# Patient Record
Sex: MALE | Race: WHITE | Employment: OTHER | URBAN - METROPOLITAN AREA
[De-identification: names, ages, dates, MRNs, and addresses within clinical notes are randomized per-mention and may not be internally consistent; named-entity substitution may affect disease eponyms.]

---

## 2017-02-02 DIAGNOSIS — M54.2 CERVICALGIA: ICD-10-CM

## 2017-06-01 ENCOUNTER — GENERIC CONVERSION - ENCOUNTER (OUTPATIENT)
Dept: OTHER | Facility: OTHER | Age: 57
End: 2017-06-01

## 2017-06-29 ENCOUNTER — ALLSCRIPTS OFFICE VISIT (OUTPATIENT)
Dept: OTHER | Facility: OTHER | Age: 57
End: 2017-06-29

## 2017-06-29 ENCOUNTER — GENERIC CONVERSION - ENCOUNTER (OUTPATIENT)
Dept: OTHER | Facility: OTHER | Age: 57
End: 2017-06-29

## 2017-06-30 LAB
A/G RATIO (HISTORICAL): 1.5 (ref 1.2–2.2)
ALBUMIN SERPL BCP-MCNC: 3.9 G/DL (ref 3.5–5.5)
ALP SERPL-CCNC: 70 IU/L (ref 39–117)
ALT SERPL W P-5'-P-CCNC: 13 IU/L (ref 0–44)
AST SERPL W P-5'-P-CCNC: 26 IU/L (ref 0–40)
BASOPHILS # BLD AUTO: 0 %
BASOPHILS # BLD AUTO: 0 X10E3/UL (ref 0–0.2)
BILIRUB SERPL-MCNC: 0.3 MG/DL (ref 0–1.2)
BUN SERPL-MCNC: 19 MG/DL (ref 6–24)
BUN/CREA RATIO (HISTORICAL): 20 (ref 9–20)
CALCIUM SERPL-MCNC: 9.2 MG/DL (ref 8.7–10.2)
CHLORIDE SERPL-SCNC: 103 MMOL/L (ref 96–106)
CHOLEST SERPL-MCNC: 168 MG/DL (ref 100–199)
CHOLEST/HDLC SERPL: 3.7 RATIO UNITS (ref 0–5)
CO2 SERPL-SCNC: 25 MMOL/L (ref 18–29)
CREAT SERPL-MCNC: 0.95 MG/DL (ref 0.76–1.27)
DEPRECATED RDW RBC AUTO: 13.6 % (ref 12.3–15.4)
EGFR AFRICAN AMERICAN (HISTORICAL): 102 ML/MIN/1.73
EGFR-AMERICAN CALC (HISTORICAL): 88 ML/MIN/1.73
EOSINOPHIL # BLD AUTO: 0.1 X10E3/UL (ref 0–0.4)
EOSINOPHIL # BLD AUTO: 2 %
GLUCOSE SERPL-MCNC: 101 MG/DL (ref 65–99)
HCT VFR BLD AUTO: 41.3 % (ref 37.5–51)
HDLC SERPL-MCNC: 46 MG/DL
HGB BLD-MCNC: 13.2 G/DL (ref 12.6–17.7)
IMM.GRANULOCYTES (CD4/8) (HISTORICAL): 0 %
IMM.GRANULOCYTES (CD4/8) (HISTORICAL): 0 X10E3/UL (ref 0–0.1)
LDLC SERPL CALC-MCNC: 105 MG/DL (ref 0–99)
LYME IGG/IGM AB (HISTORICAL): <0.91 ISR (ref 0–0.9)
LYME IGM (HISTORICAL): <0.8 INDEX (ref 0–0.79)
LYMPHOCYTES # BLD AUTO: 1.7 X10E3/UL (ref 0.7–3.1)
LYMPHOCYTES # BLD AUTO: 26 %
MCH RBC QN AUTO: 27 PG (ref 26.6–33)
MCHC RBC AUTO-ENTMCNC: 32 G/DL (ref 31.5–35.7)
MCV RBC AUTO: 85 FL (ref 79–97)
MONOCYTES # BLD AUTO: 0.4 X10E3/UL (ref 0.1–0.9)
MONOCYTES (HISTORICAL): 6 %
NEUTROPHILS # BLD AUTO: 4.4 X10E3/UL (ref 1.4–7)
NEUTROPHILS # BLD AUTO: 66 %
PLATELET # BLD AUTO: 194 X10E3/UL (ref 150–379)
POTASSIUM SERPL-SCNC: 4.8 MMOL/L (ref 3.5–5.2)
PROSTATE SPECIFIC ANTIGEN (HISTORICAL): 5.3 NG/ML (ref 0–4)
RBC (HISTORICAL): 4.89 X10E6/UL (ref 4.14–5.8)
SODIUM SERPL-SCNC: 143 MMOL/L (ref 134–144)
TOT. GLOBULIN, SERUM (HISTORICAL): 2.6 G/DL (ref 1.5–4.5)
TOTAL PROTEIN (HISTORICAL): 6.5 G/DL (ref 6–8.5)
TRIGL SERPL-MCNC: 84 MG/DL (ref 0–149)
TSH SERPL DL<=0.05 MIU/L-ACNC: 2.31 UIU/ML (ref 0.45–4.5)
VLDLC SERPL CALC-MCNC: 17 MG/DL (ref 5–40)
WBC # BLD AUTO: 6.7 X10E3/UL (ref 3.4–10.8)

## 2017-07-18 ENCOUNTER — ALLSCRIPTS OFFICE VISIT (OUTPATIENT)
Dept: OTHER | Facility: OTHER | Age: 57
End: 2017-07-18

## 2017-10-10 ENCOUNTER — GENERIC CONVERSION - ENCOUNTER (OUTPATIENT)
Dept: OTHER | Facility: OTHER | Age: 57
End: 2017-10-10

## 2017-11-13 ENCOUNTER — ALLSCRIPTS OFFICE VISIT (OUTPATIENT)
Dept: OTHER | Facility: OTHER | Age: 57
End: 2017-11-13

## 2017-11-14 NOTE — PROGRESS NOTES
Assessment    1  Chronic neck pain (723 1,338 29) (M54 2,G89 29)   2  Generalized anxiety disorder (300 02) (F41 1)   3  Former smoker (V15 82) (E00 909)    Discussion/Summary    1) chronic neck pain: ASK ABOUT TRIGGER POINT INJECTIONS, PAIN MANAGEMENT - has seen Dr Elidia Williamson in the pastanxiety: controlled  has tried other medications without good success  Possible side effects of new medications were reviewed with the patient/guardian today  The treatment plan was reviewed with the patient/guardian  The patient/guardian understands and agrees with the treatment plan      Chief Complaint  PT HERE TO SIGN THE PAIN CONTRACT TODAY  HE DOES NOT NEED ANY MEDS TODAY  History of Present Illness  SYMPTOMS OF COLD FOR THE PAST 3 WEEKS but seems to be improving  He has nasal congestion, dry cough, postnasal drip  He denies any shortness of breath  has been under stress lately because his son was recently released from [facility] living with him  Now is back in [facility] and is in need of a drug rehabilitation center  has chronic neck pain across the top of the shoulders and would like to get an evaluation      Review of Systems   Constitutional: No fever or chills, feels well, no tiredness, no recent weight gain or weight loss  Eyes: No complaints of eye pain, no red eyes, no discharge from eyes, no itchy eyes  ENT: no complaints of earache, no hearing loss, no nosebleeds, no nasal discharge, no sore throat, no hoarseness  Cardiovascular: No complaints of slow heart rate, no fast heart rate, no chest pain, no palpitations, no leg claudication, no lower extremity  Respiratory: No complaints of shortness of breath, no wheezing, no cough, no SOB on exertion, no orthopnea or PND  Gastrointestinal: No complaints of abdominal pain, no constipation, no nausea or vomiting, no diarrhea or bloody stools  Genitourinary: No complaints of dysuria, no incontinence, no hesitancy, no nocturia, no genital lesion, no testicular pain  Musculoskeletal: Chronic neck pain, but-- as noted in HPI  Integumentary: No complaints of skin rash or skin lesions, no itching, no skin wound, no dry skin  Neurological: No compliants of headache, no confusion, no convulsions, no numbness or tingling, no dizziness or fainting, no limb weakness, no difficulty walking  Psychiatric: anxiety, but-- as noted in HPI,-- not suicidal-- and-- no sleep disturbances  Endocrine: No complaints of proptosis, no hot flashes, no muscle weakness, no erectile dysfunction, no deepening of the voice, no feelings of weakness  Hematologic/Lymphatic: No complaints of swollen glands, no swollen glands in the neck, does not bleed easily, no easy bruising  Active Problems  1  Benign hypertrophy of prostate (600 00) (N40 0)   2  Cataract (366 9) (H26 9)   3  Chronic neck pain (723 1,338 29) (M54 2,G89 29)   4  Chronic pain (338 29) (G89 29)   5  Depression (311) (F32 9)   6  Elevated PSA (790 93) (R97 20)   7  Esophageal reflux (530 81) (K21 9)   8  Fatigue (780 79) (R53 83)   9  Generalized anxiety disorder (300 02) (F41 1)   10  HCV infection (070 70) (B19 20)   11  Hyperlipidemia (272 4) (E78 5)    Past Medical History  1  History of Acute Pneumonitis (486)   2  History of Bulging disc (722 2)   3  History of Hemorrhage of anus and rectum (569 3) (K62 5)   4  History of headache (V13 89) (Z87 898)   5  History of hemorrhoids (V13 89) (Z87 19)   6  History of hiatal hernia (V12 79) (Z87 19)   7  History of neoplasm of uncertain behavior of skin (V13 3) (Z86 03)   8  History of prostatitis (V13 89) (Z87 438)   9  History of urinary frequency (V13 09) (Z87 898)   10  History of Lateral epicondylitis, unspecified laterality (726 32) (M77 10)   11  History of Lyme disease (088 81) (A69 20)   12  History of Mononeuritis of upper limb, unspecified laterality (354 9) (G56 90)   13  History of Neck pain (723 1) (M54 2)   14  History of Other Specified Family Circumstances (V61 8)   15  History of Shoulder joint pain, unspecified laterality   16  History of Stye In The Right Eye (373 11)    The active problems and past medical history were reviewed and updated today  Surgical History  1  History of Fusion / Refusion Of Vertebrae   2  History of Hernia Repair    The surgical history was reviewed and updated today  Family History  Mother    1  Family history of diabetes mellitus (V18 0) (Z83 3)  Family History    2  Family history of alcoholism (V17 0) (Z81 1)    The family history was reviewed and updated today  Social History     · Former smoker (Y61 45) (F85 522)  The social history was reviewed and updated today  The social history was reviewed and is unchanged  Current Meds   1  ALPRAZolam 0 25 MG Oral Tablet; TAKE 1 TABLET FOUR TIMES A DAY PRN; Therapy: 13Wnd6024 to (Evaluate:02Zfs2615)  Requested for: 04EKY4738; Last Rx:41Kti4093 Ordered   2  Meloxicam 15 MG Oral Tablet; TAKE 1 TABLET DAILY WITH FOOD; Therapy: 48HCH8680 to (Evaluate:25Jan2018)  Requested for: 98IAD0274; Last Rx:66Ivo4445 Ordered   3  Tamsulosin HCl - 0 4 MG Oral Capsule; TAKE 2 CAPSULES DAILY 30 MINUTES FOLLOWING THE SAME MEAL EACH DAY; Therapy: 94OCO1005 to (Evaluate:96Gom8164)  Requested for: 03GOY6235; Last Rx:88Utd5878 Ordered   4  TraMADol HCl - 50 MG Oral Tablet; Take 1 tablet 4 times daily; Therapy: 20ZQO0156 to (Jenniffer Goodpasture)  Requested for: 01Foo2935; Last Rx:41Aef5774 Ordered    The medication list was reviewed and updated today  Allergies  1  Penicillins   2  Citalopram Hydrobromide TABS   3  Sulfa Drugs    Vitals  Vital Signs    Recorded: 14OYA8509 08:33AM   Temperature 96 9 F   Heart Rate 71   Systolic 610   Diastolic 80   Height 5 ft 8 5 in   Weight 155 lb 4 oz   BMI Calculated 23 26   BSA Calculated 1 85   O2 Saturation 98       Physical Exam   Constitutional  General appearance: No acute distress, well appearing and well nourished     Eyes  Conjunctiva and lids: No swelling, erythema, or discharge  Pupils and irises: Equal, round and reactive to light  Ears, Nose, Mouth, and Throat  External inspection of ears and nose: Normal    Otoscopic examination: Tympanic membrance translucent with normal light reflex  Canals patent without erythema  Nasal mucosa, septum, and turbinates: Normal without edema or erythema  Oropharynx: Normal with no erythema, edema, exudate or lesions  Pulmonary  Respiratory effort: No increased work of breathing or signs of respiratory distress  Auscultation of lungs: Clear to auscultation, equal breath sounds bilaterally, no wheezes, no rales, no rhonci  Cardiovascular  Auscultation of heart: Normal rate and rhythm, normal S1 and S2, without murmurs  Examination of extremities for edema and/or varicosities: Normal    Abdomen  Abdomen: Non-tender, no masses  Liver and spleen: No hepatomegaly or splenomegaly  Musculoskeletal  Gait and station: Normal    Digits and nails: Normal without clubbing or cyanosis  Inspection/palpation of joints, bones, and muscles: Abnormal  -- Increase tension trapezius bilaterally left greater than right  Psychiatric  Orientation to person, place and time: Normal    Mood and affect: Normal          Health Management  History of Encounter for screening colonoscopy   COLONOSCOPY; every 10 years; Last 01SWU8208; Next Due: 40GYT1682; Active  History of Medicare welcome visit   [address] AAA; every 1 year; Last 95TJZ4870; Next Due: 29Nxj8816; Active  History of Screening for depression   *[facility]-Depression Screening; every 1 year; Last 62BNL4831; Next Due: 38Ojc1864; Active  History of Screening for other and unspecified genitourinary condition   *[facility] - Urinary Incontinence Screen (Dx Z13 89 Screen for UI); every 1 year; Last 29TTX6153; NextDue: 45FBE2559; Active  *VB-Depression Screening; every 1 year; Last 35WZQ2727; Next Due: 48Flb5002;  Active  History of Special screening for other neurological conditions   *VB - Fall Risk Assessment  (Dx Z13 89 Screen for Neurologic Disorder); every 1 year; JRSO00VDW6683; Next Due: 74Mkv1195; Active  *VB - Urinary Incontinence Screen (Dx Z13 89 Screen for UI); every 1 year; Last 60JND9793; NextDue: 81QWH5513;  Active    Signatures   Electronically signed by : Marion Bermeo DO; Nov 13 2017  9:48PM EST                       (Author)

## 2017-11-15 ENCOUNTER — GENERIC CONVERSION - ENCOUNTER (OUTPATIENT)
Dept: OTHER | Facility: OTHER | Age: 57
End: 2017-11-15

## 2018-01-02 ENCOUNTER — GENERIC CONVERSION - ENCOUNTER (OUTPATIENT)
Dept: FAMILY MEDICINE CLINIC | Facility: CLINIC | Age: 58
End: 2018-01-02

## 2018-01-10 NOTE — PROGRESS NOTES
Assessment    1  Encounter for preventive health examination (V70 0) (Z00 00)   2  Former smoker (V15 82) (K60 755)   3  Benign hypertrophy of prostate (600 00) (N40 0)   4  Chronic pain (338 29) (G89 29)   5  Elevated PSA (790 93) (R97 2)   6  Chronic neck pain (723 1,338 29) (M54 2,G89 29)    Plan  Health Maintenance    · Brush your teeth freq1 and floss at least once a day ; Status:Complete;   Done:  20SKS2020   · Decreasing the stress in your life may help your condition improve ; Status:Complete;    Done: 19FEI8008   · Drink plenty of fluids ; Status:Complete;   Done: 47ISL2300   · Regular aerobic exercise can help reduce stress ; Status:Complete;   Done: 58CRU9567   · We recommend routine visits to a dentist ; Status:Complete;   Done: 12PRA5581   · We recommend that you follow the "Mediterranean diet "; Status:Complete;   Done:  37DLJ1694   · We recommend you modify your diet to achieve and maintain a healthy weight  Being  overweight may increase your risk for developing health problems such as diabetes,  heart disease, and cancer  Avoid high fat foods and eat a balanced diet rich  in fruits and vegetables  The combination of a reduced-calorie diet and increased  physical activity is recommended  Please let us know if you would like to  learn more about your nutrition and calorie needs, and additional options including  weight loss programs that can help you achieve your goals ; Status:Complete;   Done:  38GBE5271   · We recommend you modify your diet to achieve and maintain a healthy weight  Being  underweight may increase your risk of developing health problems from vitamin and  mineral deficiencies  We recommend a balanced diet rich in fruits and vegetables  You  may also consider increasing your calorie intake by eating more frequently or adding  nuts, avocados, and low-fat cheese or milk to your meals    Please let us know  if you would like to learn more about your nutrition and calorie needs, and additional  options to help you achieve your weight goals ; Status:Complete;   Done: 54CPH9305   · Call [phone] if: You have any warning signs of skin cancer ; Status:Complete;    Done: 86TPD5577   · Call 911 if: You experience a new kind of chest pain (angina) or pressure ;  Status:Complete;   Done: 90KKO7929    Discussion/Summary  Impression: health maintenance visit, healthy adult male  Currently, he eats an adequate diet and has an inadequate exercise regimen  Prostate cancer screening: prostate cancer screening is current  Colorectal cancer screening: colorectal cancer screening is current and colonoscopy is needed every ten years  Patient discussion: discussed with the patient  1) BPH/elevated PSA: KEEP FOLLOW UP WITH UROLOGY FOR ELEVATED PSA, CONTINUE TAMULOSIN  2) chronic neck pain with intermittent neuropathy: Continue meloxicam and tramadol as needed and pain management evaluation when able  3) depression/anxiety: Continue alprazolam as needed, deferring other medication for depressive symptoms, recommend counseling     Chief Complaint  PT IS HERE FOR YEARLY WELLNESS EXAM  PT WILL RESCHEDULE LIPID PANEL   [address] PHONE NUMBERS  PT DEFERRED  PT DOES NOT WANT TO BE HERE ANYMORE AND PRAYS THE [facility] WOULD TAKE HIM EVERY NIGHT  HE WILL DISCUSS WITH DR Meaghan Virgen  History of Present Illness  HM, Adult Male: The patient is being seen for a health maintenance evaluation  General Health: The patient's health since the last visit is described as fair  He does not have regular dental visits  He denies vision problems  He denies hearing loss  Immunizations status: up to date  Lifestyle:  He consumes a diverse and healthy diet  He does not have any weight concerns  He does not exercise regularly  He does not use tobacco  He denies alcohol use  He denies drug use  Reproductive health:  the patient is not sexually active     Screening: cancer screening reviewed and updated  metabolic screening reviewed and updated  risk screening reviewed and updated  HPI: Patient is here for a well exam today  He remains very upset about the divorce from his wife about 2 years ago  He is having trouble moving on his multiple stressors in his life  He is not willing to go to a therapist  He denies suicidal ideations He is thinking about moving to [place]  He denies any chest pains or shortness of breath  He has chronic neck pain and is getting intermittent symptoms into his arms  He is not ready to to any further treatment for his neck pain  He continues with tramadol  His bowel movements are regular  He is seeing a urologist for elevated PSA level  He is currently on tamsulosin daily and there has been a decrease in his PSA from November 2015 to March 2016, 7 2-6 8  Review of Systems    Constitutional: feeling tired and recent 8 lb weight loss, but as noted in HPI, no fever, not feeling poorly and no chills  Eyes: No complaints of eye pain, no red eyes, no discharge from eyes, no itchy eyes  ENT: no complaints of earache, no hearing loss, no nosebleeds, no nasal discharge, no sore throat, no hoarseness  Cardiovascular: No complaints of slow heart rate, no fast heart rate, no chest pain, no palpitations, no leg claudication, no lower extremity  Respiratory: No complaints of shortness of breath, no wheezing, no cough, no SOB on exertion, no orthopnea or PND  Gastrointestinal: No complaints of abdominal pain, no constipation, no nausea or vomiting, no diarrhea or bloody stools  Genitourinary: No complaints of dysuria, no incontinence, no hesitancy, no nocturia, no genital lesion, no testicular pain  Musculoskeletal: Chronic neck pain, but as noted in HPI  Integumentary: No complaints of skin rash or skin lesions, no itching, no skin wound, no dry skin     Neurological: No compliants of headache, no confusion, no convulsions, no numbness or tingling, no dizziness or fainting, no limb weakness, no difficulty walking  Psychiatric: Is not suicidal, no sleep disturbances, no anxiety or depression, no change in personality, no emotional problems  Endocrine: No complaints of proptosis, no hot flashes, no muscle weakness, no erectile dysfunction, no deepening of the voice, no feelings of weakness  Hematologic/Lymphatic: No complaints of swollen glands, no swollen glands in the neck, does not bleed easily, no easy bruising  Active Problems    1  Abnormal loss of weight (783 21) (R63 4)   2  Benign hypertrophy of prostate (600 00) (N40 0)   3  Cataract (366 9) (H26 9)   4  Chronic pain (338 29) (G89 29)   5  Depression (311) (F32 9)   6  Elevated PSA (790 93) (R97 2)   7  Encounter for screening colonoscopy (V76 51) (Z12 11)   8  Encounter for screening for malignant neoplasm of prostate (V76 44) (Z12 5)   9  Encounter for vitamin deficiency screening (V77 99) (Z13 21)   10  Esophageal reflux (530 81) (K21 9)   11  Generalized anxiety disorder (300 02) (F41 1)   12  HCV (hepatitis C virus) (070 70) (B19 20)   13  Hyperlipidemia (272 4) (E78 5)   14   Need for influenza vaccination (V04 81) (Z23)    Past Medical History    · History of Acute Pneumonitis (486)   · History of Bulging disc (722 2)   · History of Hemorrhage of anus and rectum (569 3) (K62 5)   · History of fatigue (V13 89) (G01 072)   · History of headache (V13 89) (Y65 049)   · History of hemorrhoids (V13 89) (Z87 19)   · History of hiatal hernia (V12 79) (Z87 19)   · History of neoplasm of uncertain behavior of skin (V13 3) (Z87 2)   · History of prostatitis (V13 89) (U81 441)   · History of urinary frequency (V13 09) (Z87 898)   · History of Lateral epicondylitis, unspecified laterality (726 32) (M77 10)   · History of Lyme disease (088 81) (A69 20)   · History of Mononeuritis of upper limb, unspecified laterality (354 9) (G56 90)   · History of Neck pain (723 1) (M54 2)   · History of Other Specified Family Circumstances (V61 8)   · History of Screening for diabetes mellitus (V77 1) (Z13 1)   · History of Screening for thyroid disorder (V77 0) (Z13 29)   · History of Shoulder joint pain, unspecified laterality   · History of Stye In The Right Eye (373 11)    Surgical History    · History of Fusion / Refusion Of Vertebrae   · History of Hernia Repair    Family History  Mother    · Family history of diabetes mellitus (V18 0) (Z83 3)  Family History    · Family history of alcoholism (V17 0) (Z81 1)    Social History    · Former smoker (V15 82) (R21 739)    Current Meds   1  ALPRAZolam 0 25 MG Oral Tablet; TAKE 1 TABLET 4 TIMES DAILY AS NEEDED; Therapy: 06GEG9123 to (Evaluate:41Pgz2532)  Requested for: 51WMY6475; Last   UC:83PAY1377; Status: ACTIVE - Renewal Denied Ordered   2  Meloxicam 15 MG Oral Tablet; TAKE 1 TABLET DAILY WITH FOOD; Therapy: 89OGV2014 to (Evaluate:14Iid7898)  Requested for: 19ZZP2009; Last   Rx:92Tpw1256 Ordered   3  Tamsulosin HCl - 0 4 MG Oral Capsule; TAKE 2 CAPSULES DAILY 30 MINUTES   FOLLOWING THE SAME MEAL EACH DAY; Therapy: 34JJH5784 to (Evaluate:24Gfo5331)  Requested for: 08DWC4466; Last   Rx:18Iew7787 Ordered   4  TraMADol HCl - 50 MG Oral Tablet; TAKE 1 TABLET FOUR TIMES A DAY; Therapy: 43YMR0834 to (Evaluate:12Pnk1785)  Requested for: 44KFL9373; Last   Rx:56Hay6134; Status: ACTIVE - Renewal Denied Ordered    Allergies    1  Penicillins   2  Citalopram Hydrobromide TABS   3  Sulfa Drugs    Vitals   Recorded: 39UJO0408 11:24AM   Temperature 98 4 F   Heart Rate 68   Systolic 387   Diastolic 85   Height 5 ft 8 5 in   Weight 158 lb 8 0 oz   BMI Calculated 23 75   BSA Calculated 1 86   O2 Saturation 98     Physical Exam    Constitutional   General appearance: No acute distress, well appearing and well nourished  Head and Face   Head and face: Normal     Palpation of the face and sinuses: No sinus tenderness  Eyes   Conjunctiva and lids: No erythema, swelling or discharge      Pupils and irises: Equal, round, reactive to light  Ears, Nose, Mouth, and Throat   External inspection of ears and nose: Normal     Otoscopic examination: Tympanic membranes translucent with normal light reflex  Canals patent without erythema  Hearing: Normal     Nasal mucosa, septum, and turbinates: Normal without edema or erythema  Lips, teeth, and gums: Normal, good dentition  Oropharynx: Normal with no erythema, edema, exudate or lesions  Neck   Neck: Supple, symmetric, trachea midline, no masses  Thyroid: Normal, no thyromegaly  Pulmonary   Respiratory effort: No increased work of breathing or signs of respiratory distress  Auscultation of lungs: Clear to auscultation  Cardiovascular   Auscultation of heart: Normal rate and rhythm, normal S1 and S2, no murmurs  Examination of extremities for edema and/or varicosities: Normal     Abdomen   Abdomen: Non-tender, no masses  Liver and spleen: No hepatomegaly or splenomegaly  Lymphatic   Palpation of lymph nodes in neck: No lymphadenopathy  Musculoskeletal   Gait and station: Normal     Inspection/palpation of digits and nails: Normal without clubbing or cyanosis  Range of motion: Abnormal   Decreased range of motion cervical spine  Stability: Normal     Muscle strength/tone: Normal     Skin   Skin and subcutaneous tissue: Normal without rashes or lesions  Palpation of skin and subcutaneous tissue: Normal turgor  Neurologic   Cortical function: Normal mental status  Coordination: Normal finger to nose and heel to shin  Psychiatric   Judgment and insight: Normal     Orientation to person, place and time: Normal     Recent and remote memory: Intact  Mood and affect: Abnormal   Agitated        Results/Data  PHQ-9 Adult Depression Screening 29Jun2016 11:37AM User, Ahs     Test Name Result Flag Reference   PHQ-9 Adult Depression Score 14     Over the last two weeks, how often have you been bothered by any of the following problems? Little interest or pleasure in doing things: More than half the days - 2  Feeling down, depressed, or hopeless: More than half the days - 2  Trouble falling or staying asleep, or sleeping too much: More than half the days - 2  Feeling tired or having little energy: Several days - 1  Poor appetite or over eating: More than half the days - 2  Feeling bad about yourself - or that you are a failure or have let yourself or your family down: More than half the days - 2  Trouble concentrating on things, such as reading the newspaper or watching television: More than half the days - 2  Moving or speaking so slowly that other people could have noticed  Or the opposite -  being so fidgety or restless that you have been moving around a lot more than usual: Several days - 1  Thoughts that you would be better off dead, or of hurting yourself in some way: Not at all - 0   PHQ-9 Adult Depression Screening Positive     PHQ-9 Difficulty Level Somewhat difficult     PHQ-9 Severity Moderate Depression       (1) PSA (SCREEN) (Dx V76 44 Screen for Prostate Cancer) 01KXB6049 08:36AM Akosua Cruz     Test Name Result Flag Reference   Prostate Specific Ag, Serum 6 8 ng/mL H 0 0-4 0   Roche ECLIA methodology  According to the American [employer] Association, Serum PSA should  decrease and remain at undetectable levels after radical  prostatectomy  The AUA defines biochemical recurrence as an initial  PSA value 0 2 ng/mL or greater followed by a subsequent confirmatory  PSA value 0 2 ng/mL or greater  Values obtained with different assay methods or kits cannot be used  interchangeably  Results cannot be interpreted as absolute evidence  of the presence or absence of malignant disease  Health Management  Encounter for screening colonoscopy   COLONOSCOPY; every 10 years; Last 90EMO6731; Next Due: 53REG3409;  Active    Signatures   Electronically signed by : Cosme Denny DO; Jun 30 2016 12:22AM EST (Author)

## 2018-01-11 NOTE — RESULT NOTES
Verified Results  (LC) Vitamin D, 25-Hydroxy, Total 67LZY9177 08:36AM Wisam Sheller     Test Name Result Flag Reference   Vitamin D, 25-Hydroxy, Serum 26 ng/mL L    Reference Range:   All Ages: Target levels 30 - 100

## 2018-01-11 NOTE — RESULT NOTES
Verified Results  (1) CBC/PLT/DIFF [id number] 08:36AM Taras Bossman     Test Name Result Flag Reference   WBC 8 7 x10E3/uL  3 4-10 8   RBC 5 37 x10E6/uL  4 14-5 80   Hemoglobin 14 3 g/dL  12 6-17 7   Hematocrit 44 9 %  37 5-51 0   MCV 84 fL  79-97   MCH 26 6 pg  26 6-33 0   MCHC 31 8 g/dL  31 5-35 7   RDW 14 1 %  12 3-15 4   Platelets 190 B31Y1/NS  150-379   Neutrophils 59 %     Lymphs 32 %     Monocytes 6 %     Eos 3 %     Basos 0 %     Neutrophils (Absolute) 5 0 x10E3/uL  1 4-7 0   Lymphs (Absolute) 2 8 x10E3/uL  0 7-3 1   Monocytes(Absolute) 0 6 x10E3/uL  0 1-0 9   Eos (Absolute) 0 3 x10E3/uL  0 0-0 4   Baso (Absolute) 0 0 x10E3/uL  0 0-0 2   Immature Granulocytes 0 %     Immature Grans (Abs) 0 0 x10E3/uL  0 0-0 1     (1) TSH [id number] 08:36AM Taras Bossman     Test Name Result Flag Reference   TSH 3 390 uIU/mL  0 450-4 500     (1) LIPID PANEL, FASTING [id number] 08:36AM Taras Bennington     Test Name Result Flag Reference   Cholesterol, Total 218 mg/dL H 100-199   Triglycerides 107 mg/dL  0-149   HDL Cholesterol 43 mg/dL  >39   According to ATP-III Guidelines, HDL-C >59 mg/dL is considered a  negative risk factor for CHD  VLDL Cholesterol Blayne 21 mg/dL  5-40   LDL Cholesterol Calc 154 mg/dL H 0-99   T  Chol/HDL Ratio 5 1 ratio units H 0 0-5 0   T  Chol/HDL Ratio                                                             Men  Women                                               1/2 Avg  Risk  3 4    3 3                                                   Avg Risk  5 0    4 4                                                2X Avg  Risk  9 6    7 1                                                3X Avg  Risk 23 4   11 0     (1) PSA (SCREEN) (Dx V76 44 Screen for Prostate Cancer) [id number] 08:36AM Taras Bossman     Test Name Result Flag Reference   Prostate Specific Ag, Serum 6 8 ng/mL H 0 0-4 0   Roche ECLIA methodology    According to the American Urological Association, Serum PSA should  decrease and remain at undetectable levels after radical  prostatectomy  The AUA defines biochemical recurrence as an initial  PSA value 0 2 ng/mL or greater followed by a subsequent confirmatory  PSA value 0 2 ng/mL or greater  Values obtained with different assay methods or kits cannot be used  interchangeably  Results cannot be interpreted as absolute evidence  of the presence or absence of malignant disease  Plan  Benign prostatic hypertrophy    · (LC) PSA Total+% Free; Status:Active;  Requested for:18Mar2016;

## 2018-01-12 VITALS
HEART RATE: 61 BPM | TEMPERATURE: 98.6 F | SYSTOLIC BLOOD PRESSURE: 114 MMHG | OXYGEN SATURATION: 97 % | BODY MASS INDEX: 22.92 KG/M2 | HEIGHT: 69 IN | WEIGHT: 154.75 LBS | DIASTOLIC BLOOD PRESSURE: 80 MMHG

## 2018-01-14 VITALS
HEART RATE: 71 BPM | DIASTOLIC BLOOD PRESSURE: 80 MMHG | BODY MASS INDEX: 22.99 KG/M2 | SYSTOLIC BLOOD PRESSURE: 115 MMHG | OXYGEN SATURATION: 98 % | HEIGHT: 69 IN | WEIGHT: 155.25 LBS | TEMPERATURE: 96.9 F

## 2018-01-17 NOTE — RESULT NOTES
Verified Results  (1) CBC/PLT/DIFF [id number] 12:00AM Theone Pump     Test Name Result Flag Reference   WBC 6 7 x10E3/uL  3 4-10 8   RBC 4 89 x10E6/uL  4 14-5 80   Hemoglobin 13 2 g/dL  12 6-17 7   Hematocrit 41 3 %  37 5-51 0   MCV 85 fL  79-97   MCH 27 0 pg  26 6-33 0   MCHC 32 0 g/dL  31 5-35 7   RDW 13 6 %  12 3-15 4   Platelets 116 G05G9/WA  150-379   Neutrophils 66 %     Lymphs 26 %     Monocytes 6 %     Eos 2 %     Basos 0 %     Neutrophils (Absolute) 4 4 x10E3/uL  1 4-7 0   Lymphs (Absolute) 1 7 x10E3/uL  0 7-3 1   Monocytes(Absolute) 0 4 x10E3/uL  0 1-0 9   Eos (Absolute) 0 1 x10E3/uL  0 0-0 4   Baso (Absolute) 0 0 x10E3/uL  0 0-0 2   Immature Granulocytes 0 %     Immature Grans (Abs) 0 0 x10E3/uL  0 0-0 1     (1) COMPREHENSIVE METABOLIC PANEL [id number] 18:66FF Theone Pump     Test Name Result Flag Reference   Glucose, Serum 101 mg/dL H 65-99   BUN 19 mg/dL  6-24   Creatinine, Serum 0 95 mg/dL  0 76-1 27   BUN/Creatinine Ratio 20  9-20   Sodium, Serum 143 mmol/L  134-144   Potassium, Serum 4 8 mmol/L  3 5-5 2   Chloride, Serum 103 mmol/L     Carbon Dioxide, Total 25 mmol/L  18-29   Calcium, Serum 9 2 mg/dL  8 7-10 2   Protein, Total, Serum 6 5 g/dL  6 0-8 5   Albumin, Serum 3 9 g/dL  3 5-5 5   Globulin, Total 2 6 g/dL  1 5-4 5   A/G Ratio 1 5  1 2-2 2   Bilirubin, Total 0 3 mg/dL  0 0-1 2   Alkaline Phosphatase, S 70 IU/L     AST (SGOT) 26 IU/L  0-40   ALT (SGPT) 13 IU/L  0-44   eGFR If NonAfricn Am 88 mL/min/1 73  >59   eGFR If Africn Am 102 mL/min/1 73  >59     (1) LIPID PANEL, FASTING 29Jun2017 12:00AM Theone Pump     Test Name Result Flag Reference   Cholesterol, Total 168 mg/dL  100-199   Triglycerides 84 mg/dL  0-149   HDL Cholesterol 46 mg/dL  >39   VLDL Cholesterol Blayne 17 mg/dL  5-40   LDL Cholesterol Calc 105 mg/dL H 0-99   T  Chol/HDL Ratio 3 7 ratio units  0 0-5 0   T   Chol/HDL Ratio                                                             Men  Women 1/2 Avg  Risk  3 4    3 3                                                   Avg Risk  5 0    4 4                                                2X Avg  Risk  9 6    7 1                                                3X Avg  Risk 23 4   11 0     (1) PSA (SCREEN) (Dx V76 44 Screen for Prostate Cancer) [id number] 12:00AM Lilli Crown     Test Name Result Flag Reference   Prostate Specific Ag, Serum 5 3 ng/mL H 0 0-4 0   Roche ECLIA methodology  According to the American Urological Association, Serum PSA should  decrease and remain at undetectable levels after radical  prostatectomy  The AUA defines biochemical recurrence as an initial  PSA value 0 2 ng/mL or greater followed by a subsequent confirmatory  PSA value 0 2 ng/mL or greater  Values obtained with different assay methods or kits cannot be used  interchangeably  Results cannot be interpreted as absolute evidence  of the presence or absence of malignant disease  (1) TSH [id number] 12:00AM Lilli Crown     Test Name Result Flag Reference   TSH 2 310 uIU/mL  0 450-4 500     (LC) Lyme Ab/Western Blot Reflex [id number] 12:00AM Lilli Crown     Test Name Result Flag Reference   Lyme IgG/IgM Ab <0 91 ISR  0 00-0 90   Negative         <0 91                                                 Equivocal  0 91 - 1 09                                                 Positive         >1 09   Lyme Disease Ab, Quant, IgM <0 80 index  0 00-0 79   Negative         <0 80                                                 Equivocal  0 80 - 1 19                                                 Positive         >1 19                  IgM levels may peak at 3-6 weeks post infection, then                  gradually decline         Plan  Abnormal loss of weight, Ache in joint, Benign hypertrophy of prostate, Elevated PSA,  Encounter for vitamin deficiency screening, Fatigue, HCV (hepatitis C virus),  Hyperlipidemia, Screening for diabetes mellitus, Screening for iron deficiency anemia,  Screening for thyroid disorder, Tick bite    · Routine Venipuncture - POC; Status:Complete;   Done: 35YWT6437  Ache in joint, Fatigue, Tick bite    · (1) LYME ANTIBODY PROFILE W/REFLEX TO WESTERN BLOT; Status:Complete;    Done: 31VRE0572

## 2018-01-24 NOTE — PROGRESS NOTES
Assessment   1  Medicare welcome visit (V70 0) (Z00 00)  2  Former smoker (V15 82) (A71 103)  3  HCV infection (070 70) (B19 20)  4  Special screening for other neurological conditions (V80 09) (Z13 89)  5  Screening for other and unspecified genitourinary condition (V81 6) (Z13 89)  6  Screening for depression (V79 0) (Z13 89)  7  Medicare welcome exam (V70 0) (Z00 00)    Plan  HCV infection    · (1) HEP C RNA PCR, QUANTITATIVE; Status:Active; Requested for:94Slk5621;   Medicare welcome exam, Screening for depression    · Medicare Annual Wellness Visit; Status:Complete;   Done: 45GMJ7095 02:37PM  Medicare welcome visit    · [address] AAA; Status:Active; Requested for:48Txm3010;    · [address] AAA ; every 1 year; Next 45CAG1414; Status:Active  Screening for depression, Screening for other and unspecified genitourinary condition    · *VB-Depression Screening; Status:Complete;   Done: 50JTG1811 02:36PM   · *VB-Depression Screening ; every 1 year; Last 03PFL6010; Next 85UYZ9368; Status:Active  Screening for other and unspecified genitourinary condition, Special screening for other  neurological conditions    · *VB - Urinary Incontinence Screen (Dx Z13 89 Screen for UI); Status:Complete;   Done:  43NIK7312 02:36PM   · *VB - Urinary Incontinence Screen (Dx Z13 89 Screen for UI) ; every 1 year; Last  98NSE2770; Next 74GZP7164; Status:Active  Special screening for other neurological conditions    · *VB - Fall Risk Assessment  (Dx Z13 89 Screen for Neurologic Disorder);  Status:Complete;   Done: 86SEK4061 02:36PM   · *VB - Fall Risk Assessment  (Dx Z13 89 Screen for Neurologic Disorder) ; every 1 year; Last 52ZSN0792; Next 00DMI9696; Status:Active    Discussion/Summary    1) schedule ultrasound abdomen- screen AAA   2) schedule with Dr Kevin Sanders to discuss treatment for hepatitis c  3) viral load - hepatitis c - nonfasting at the lab   4) [facility] - counseling     Impression: Initial Annual Wellness Visit  Cardiovascular screening and counseling: screening is current  Diabetes screening and counseling: screening is current  Colorectal cancer screening and counseling: screening is current  Prostate cancer screening and counseling: screening is current  Osteoporosis screening and counseling: screening not indicated  Abdominal aortic aneurysm screening and counseling: screening not indicated  Glaucoma screening and counseling: the risks and benefits of screening were discussed  HIV screening and counseling: the risks and benefits of screening were discussed  Immunizations: the patient declines the influenza vaccination, hepatitis B vaccination series is not indicated at this time due to the patient's low risk of kulwinder the disease, Td vaccination up to date and Tdap vaccination up to date  Advance Directive Planning: complete and up to date  Patient Discussion: plan discussed with the patient  Possible side effects of new medications were reviewed with the patient/guardian today  The treatment plan was reviewed with the patient/guardian  The patient/guardian understands and agrees with the treatment plan      Advance Directives  Advance Directive [facility]:   The patient is not in agreement to receive the Advance Care Planning service    NO - Patient does not have an advance health care directive  Capacity/Competence: This patient has full decision making capacity for discussion of advance care planning and This patient has full decision making competency for discussion of advance care planning  History of Present Illness  HPI: PATIENT IS HERE FOR WELCOME TO MEDICARE  he now has a 11 month old Spanish pack named trina and has changed his outlook on life  now interested in improving his health  has been able to decrease on his medication   taking alprazolam 2 tabs daily 1/2 tab 4 times a day and has also decreased tramadol to 3 times a day   tamsulosin 1 tab daily urinating ok  bms normal  reviewed blood work with patient   still having trouble getting over the past- thinks he would now like to talk with a therapist   brother also with hepatitis c and tried new treatment- successful  pt would also like to be evaluated for new hepatitis c treatment  Welcome to [employer] and Wellness Visits: The patient is being seen for the welcome to medicare visit  Medicare Screening and Risk Factors   Hospitalizations: no previous hospitalizations  Once per lifetime medicare screening tests: ECG  Medicare Screening Tests Risk Questions   Abdominal aortic aneurysm risk assessment: none indicated  Osteoporosis risk assessment:  and over 48years of age  HIV risk assessment: none indicated  Drug and Alcohol Use: The patient is a former cigarette smoker and smoked 1ppd 18 years  The patient reports never drinking alcohol  He has previously used illicit drugs  He reports using marijuana  Diet and Physical Activity: Current diet includes limited junk food, 2 servings of fruit per day, 2 servings of vegetables per day, 2 servings of meat per day, 1 servings of whole grains per day and 6 glasses water  He exercises daily  Exercise: walking, stretching 90 minutes per day  Mood Disorder and Cognitive Impairment Screening:   Depression screening  no significant symptoms  He denies feeling down, depressed, or hopeless over the past two weeks  He denies feeling little interest or pleasure in doing things over the past two weeks  Cognitive impairment screening: denies difficulty learning/retaining new information, denies difficulty handling complex tasks, denies difficulty with reasoning, denies difficulty with spatial ability and orientation, denies difficulty with language and denies difficulty with behavior  Functional Ability/Level of Safety: Hearing is slightly decreased and a hearing aid is not used  He reports hearing difficulties   The patient is currently able to drive with limitations, but able to do activities of daily living without limitations, able to do instrumental activities of daily living without limitations and able to participate in social activities without limitations  Activities of daily living details: does not need help using the phone, no transportation help needed, does not need help shopping, no meal preparation help needed, does not need help doing housework, does not need help doing laundry, does not need help managing medications and does not need help managing money  Home safety risk factors:  no grab bars in the bathroom and no handrails on the stairs, but no unfamiliar surroundings, no loose rugs, no poor household lighting, no uneven floors and no household clutter  Advance Directives: Advance directives: no living will, no durable power of  for health care directives and no advance directives  end of life decisions were reviewed with the patient and I agree with the patient's decisions  Co-Managers and Medical Equipment/Suppliers: See Patient Care Team   Preventive Quality Program 65 and Older: Urinary Incontinence Symptoms includes: urinary frequency and weak stream        Patient Care Team    Care Team Member Role Specialty Office Number   Brooke Savagelander DO  Family Medicine [phone]     Review of Systems    Constitutional: negative  Head and Face: negative  Eyes: negative  ENT: negative  Cardiovascular: negative  Respiratory: negative  Gastrointestinal: negative  Genitourinary: negative  Musculoskeletal: neck pain- chronic, but as noted in HPI  Integumentary and Breasts: negative  Neurological: negative  Psychiatric: anxiety, depression and depression improved, but as noted in HPI and not suicidal    Endocrine: negative  Active Problems   1  Abnormal loss of weight (783 21) (R63 4)  2  Ache in joint (719 40) (M25 50)  3  Benign hypertrophy of prostate (600 00) (N40 0)  4   Cataract (366 9) (H26 9)  5  Chronic neck pain (723 1,338 29) (M54 2,G89 29)  6  Chronic pain (338 29) (G89 29)  7  Depression (311) (F32 9)  8  Elevated PSA (790 93) (R97 20)  9  Encounter for screening colonoscopy (V76 51) (Z12 11)  10  Encounter for screening for malignant neoplasm of prostate (V76 44) (Z12 5)  11  Encounter for vitamin deficiency screening (V77 99) (Z13 21)  12  Esophageal reflux (530 81) (K21 9)  13  Fatigue (780 79) (R53 83)  14  Generalized anxiety disorder (300 02) (F41 1)  15  HCV infection (070 70) (B19 20)  16  Hyperlipidemia (272 4) (E78 5)  17  Need for influenza vaccination (V04 81) (Z23)  18  Screening for diabetes mellitus (V77 1) (Z13 1)  19  Screening for iron deficiency anemia (V78 0) (Z13 0)  20  Screening for thyroid disorder (V77 0) (Z13 29)  21  Tick bite (919 4,E906 4) (W57 XXXA)    Past Medical History    · History of Acute Pneumonitis (486)   · History of Bulging disc (722 2)   · History of Hemorrhage of anus and rectum (569 3) (K62 5)   · History of headache (V13 89) (M11 260)   · History of hemorrhoids (V13 89) (Z87 19)   · History of hiatal hernia (V12 79) (Z87 19)   · History of neoplasm of uncertain behavior of skin (V13 3) (Z86 03)   · History of prostatitis (V13 89) (R11 031)   · History of urinary frequency (V13 09) (Z87 898)   · History of Lateral epicondylitis, unspecified laterality (726 32) (M77 10)   · History of Lyme disease (088 81) (A69 20)   · History of Mononeuritis of upper limb, unspecified laterality (354 9) (G56 90)   · History of Neck pain (723 1) (M54 2)   · History of Other Specified Family Circumstances (V61 8)   · History of Shoulder joint pain, unspecified laterality   · History of Stye In The Right Eye (373 11)    The active problems and past medical history were reviewed and updated today  Surgical History    · History of Fusion / Refusion Of Vertebrae   · History of Hernia Repair    The surgical history was reviewed and updated today         Family History  Mother    · Family history of diabetes mellitus (V18 0) (Z83 3)  Family History    · Family history of alcoholism (V17 0) (Z81 1)    The family history was reviewed and updated today  Social History    · Former smoker (A03 57) (D77 568)  The social history was reviewed and updated today  The social history was reviewed and is unchanged  Current Meds  1  ALPRAZolam 0 25 MG Oral Tablet; TAKE 1 TABLET FOUR TIMES A DAY prn, do not fill   unitl after 7/11; Therapy: 65Tit6848 to (Evaluate:41Vet9703)  Requested for: 34CDF9669; Last   Rx:13Jun2017; Status: ACTIVE - Renewal Voided Ordered  2  Meloxicam 15 MG Oral Tablet; TAKE 1 TABLET DAILY WITH FOOD; Therapy: 42KIC5970 to (Evaluate:59Geh1810)  Requested for: 94JYF3608; Last   Rx:30Jan2017 Ordered  3  Tamsulosin HCl - 0 4 MG Oral Capsule; TAKE 2 CAPSULES DAILY 30 MINUTES   FOLLOWING THE SAME MEAL EACH DAY; Therapy: 96BWU4546 to (Evaluate:94Dxh3678)  Requested for: 31KBS9476; Last   Rx:83Sny5258 Ordered  4  TraMADol HCl - 50 MG Oral Tablet; Take 1 tablet 4 times daily; Therapy: 28MZB9054 to (Evaluate:78Lpy5637)  Requested for: 11YKZ2241; Last   QD:36TYG1823 Ordered    The medication list was reviewed and updated today  Allergies   1  Penicillins  2  Citalopram Hydrobromide TABS  3  Sulfa Drugs    Immunizations   1    Influenza  Temporarily Deferred: Pt refuses    Tdap  05-Aug-2013     Vitals  Signs    Temperature: 98 6 F  Heart Rate: 61  Systolic: 638  Diastolic: 80  Height: 5 ft 8 5 in  Weight: 154 lb 12 oz  BMI Calculated: 23 19  BSA Calculated: 1 84  O2 Saturation: 97    Physical Exam    Constitutional   General appearance: No acute distress, well appearing and well nourished  Head and Face   Head and face: Normal     Palpation of the face and sinuses: No sinus tenderness  Eyes   Conjunctiva and lids: No erythema, swelling or discharge  Pupils and irises: Equal, round, reactive to light      Ears, Nose, Mouth, and Throat   External inspection of ears and nose: Normal     Otoscopic examination: Tympanic membranes translucent with normal light reflex  Canals patent without erythema  Hearing: Normal     Nasal mucosa, septum, and turbinates: Normal without edema or erythema  Lips, teeth, and gums: Normal, good dentition  Oropharynx: Normal with no erythema, edema, exudate or lesions  Neck   Neck: Supple, symmetric, trachea midline, no masses  Thyroid: Normal, no thyromegaly  Pulmonary   Respiratory effort: No increased work of breathing or signs of respiratory distress  Auscultation of lungs: Clear to auscultation  Cardiovascular   Auscultation of heart: Normal rate and rhythm, normal S1 and S2, no murmurs  Examination of extremities for edema and/or varicosities: Normal     Abdomen   Abdomen: Non-tender, no masses  Liver and spleen: No hepatomegaly or splenomegaly  Lymphatic   Palpation of lymph nodes in neck: No lymphadenopathy  Skin   Skin and subcutaneous tissue: Abnormal   verucca small 0 3 hands bilaterally  Neurologic   Cortical function: Normal mental status  Coordination: Normal finger to nose and heel to shin  Psychiatric   Judgment and insight: Normal     Orientation to person, place and time: Normal     Recent and remote memory: Intact  Mood and affect: Normal        Results/Data  Medicare Annual Wellness Visit 45ULA7950 02:37PM Felisa Walter     Test Name Result Flag Reference   MEDICARE [place] VISIT [id number]       Falls Risk Assessment (Dx Z13 89 Screen for Neurologic Disorder) 07REW5905 02:29PM John Paul, Shakila     Test Name Result Flag Reference   Falls Risk      No falls in the past year     A 12 lead ECG was performed and was normal 1   Rhythm and rate:1  ventricular rate is 65 beats per minute1 , but normal sinus rhythm1   P-waves:1  VT interval is normal1 , but the P wave is normal1   QRS:1  the QRS is normal1   ST segment:1  the ST segments are normal1      T waves:1 normal1   Comparison to prior ECGs:1   No prior ECGs were available for comparison1         1 Amended By: Phong Pizano; Jul 19 2017 7:43 AM EST    Procedure    Procedure: Visual Acuity Test    Indication: routine screening  Inforrmation supplied by a Snellen chart  Results: 20/20 in both eyes without corrective device, 20/20 in the right eye without corrective device, 20/20 in the left eye without corrective device   Color vision was and the results were normal       Health Management  Encounter for screening colonoscopy   COLONOSCOPY; every 10 years; Last 94EBE9081; Next Due: 49ZBZ3441;  Active    Signatures   Electronically signed by : Miladis Graf DO; Jul 19 2017  7:43AM EST                       (Author)

## 2018-02-08 ENCOUNTER — TELEPHONE (OUTPATIENT)
Dept: FAMILY MEDICINE CLINIC | Facility: CLINIC | Age: 58
End: 2018-02-08

## 2018-02-08 DIAGNOSIS — F41.9 ANXIETY: Primary | ICD-10-CM

## 2018-02-08 RX ORDER — TAMSULOSIN HYDROCHLORIDE 0.4 MG/1
CAPSULE ORAL
COMMUNITY
Start: 2011-12-06 | End: 2018-02-24 | Stop reason: SDUPTHER

## 2018-02-08 RX ORDER — ALPRAZOLAM 0.25 MG/1
1 TABLET ORAL 4 TIMES DAILY PRN
COMMUNITY
Start: 2011-04-18 | End: 2018-02-08 | Stop reason: SDUPTHER

## 2018-02-08 RX ORDER — MELOXICAM 15 MG/1
1 TABLET ORAL DAILY
COMMUNITY
Start: 2015-02-12 | End: 2018-07-23 | Stop reason: SDUPTHER

## 2018-02-08 RX ORDER — ALPRAZOLAM 0.25 MG/1
0.25 TABLET ORAL 4 TIMES DAILY PRN
Qty: 120 TABLET | Refills: 0 | Status: SHIPPED | OUTPATIENT
Start: 2018-02-08 | End: 2018-03-12 | Stop reason: SDUPTHER

## 2018-02-08 RX ORDER — TRAMADOL HYDROCHLORIDE 50 MG/1
1 TABLET ORAL 4 TIMES DAILY
COMMUNITY
Start: 2011-05-12 | End: 2018-02-26 | Stop reason: SDUPTHER

## 2018-02-24 DIAGNOSIS — N40.1 BENIGN PROSTATIC HYPERPLASIA WITH URINARY FREQUENCY: Primary | ICD-10-CM

## 2018-02-24 DIAGNOSIS — R35.0 BENIGN PROSTATIC HYPERPLASIA WITH URINARY FREQUENCY: Primary | ICD-10-CM

## 2018-02-26 DIAGNOSIS — R35.0 BENIGN PROSTATIC HYPERPLASIA WITH URINARY FREQUENCY: ICD-10-CM

## 2018-02-26 DIAGNOSIS — G89.4 CHRONIC PAIN SYNDROME: Primary | ICD-10-CM

## 2018-02-26 DIAGNOSIS — N40.1 BENIGN PROSTATIC HYPERPLASIA WITH URINARY FREQUENCY: ICD-10-CM

## 2018-02-26 RX ORDER — TAMSULOSIN HYDROCHLORIDE 0.4 MG/1
CAPSULE ORAL
Qty: 60 CAPSULE | Refills: 2 | Status: SHIPPED | OUTPATIENT
Start: 2018-02-26 | End: 2018-02-26 | Stop reason: SDUPTHER

## 2018-02-27 DIAGNOSIS — G89.29 CHRONIC NECK PAIN: ICD-10-CM

## 2018-02-27 DIAGNOSIS — R35.0 BENIGN PROSTATIC HYPERPLASIA WITH URINARY FREQUENCY: Primary | ICD-10-CM

## 2018-02-27 DIAGNOSIS — N40.1 BENIGN PROSTATIC HYPERPLASIA WITH URINARY FREQUENCY: Primary | ICD-10-CM

## 2018-02-27 DIAGNOSIS — M54.2 CHRONIC NECK PAIN: ICD-10-CM

## 2018-02-27 DIAGNOSIS — G89.4 CHRONIC PAIN SYNDROME: ICD-10-CM

## 2018-02-27 RX ORDER — TAMSULOSIN HYDROCHLORIDE 0.4 MG/1
0.4 CAPSULE ORAL
Qty: 180 CAPSULE | Refills: 0 | Status: SHIPPED | OUTPATIENT
Start: 2018-02-27 | End: 2018-06-19 | Stop reason: SDUPTHER

## 2018-02-27 RX ORDER — TRAMADOL HYDROCHLORIDE 50 MG/1
50 TABLET ORAL 4 TIMES DAILY
Qty: 360 TABLET | Refills: 0 | Status: SHIPPED | OUTPATIENT
Start: 2018-02-27 | End: 2018-04-30 | Stop reason: SDUPTHER

## 2018-03-12 ENCOUNTER — TELEPHONE (OUTPATIENT)
Dept: FAMILY MEDICINE CLINIC | Facility: CLINIC | Age: 58
End: 2018-03-12

## 2018-03-12 DIAGNOSIS — F41.9 ANXIETY: ICD-10-CM

## 2018-03-12 RX ORDER — ALPRAZOLAM 0.25 MG/1
0.25 TABLET ORAL 4 TIMES DAILY PRN
Qty: 120 TABLET | Refills: 0 | Status: SHIPPED | OUTPATIENT
Start: 2018-03-12 | End: 2018-04-12 | Stop reason: SDUPTHER

## 2018-03-30 ENCOUNTER — TELEPHONE (OUTPATIENT)
Dept: FAMILY MEDICINE CLINIC | Facility: CLINIC | Age: 58
End: 2018-03-30

## 2018-03-30 ENCOUNTER — OFFICE VISIT (OUTPATIENT)
Dept: FAMILY MEDICINE CLINIC | Facility: CLINIC | Age: 58
End: 2018-03-30
Payer: MEDICARE

## 2018-03-30 VITALS
OXYGEN SATURATION: 96 % | DIASTOLIC BLOOD PRESSURE: 82 MMHG | TEMPERATURE: 98.9 F | HEART RATE: 86 BPM | BODY MASS INDEX: 22.14 KG/M2 | HEIGHT: 69 IN | WEIGHT: 149.5 LBS | SYSTOLIC BLOOD PRESSURE: 120 MMHG

## 2018-03-30 DIAGNOSIS — J01.00 ACUTE NON-RECURRENT MAXILLARY SINUSITIS: Primary | ICD-10-CM

## 2018-03-30 PROCEDURE — 99213 OFFICE O/P EST LOW 20 MIN: CPT | Performed by: FAMILY MEDICINE

## 2018-03-30 RX ORDER — DOXYCYCLINE HYCLATE 100 MG/1
100 CAPSULE ORAL EVERY 12 HOURS SCHEDULED
Qty: 20 CAPSULE | Refills: 0 | Status: SHIPPED | OUTPATIENT
Start: 2018-03-30 | End: 2018-04-09

## 2018-03-30 RX ORDER — DOXYCYCLINE HYCLATE 100 MG/1
100 CAPSULE ORAL EVERY 12 HOURS SCHEDULED
Qty: 20 CAPSULE | Refills: 0 | Status: SHIPPED | OUTPATIENT
Start: 2018-03-30 | End: 2018-03-30 | Stop reason: SDUPTHER

## 2018-03-30 RX ORDER — GLECAPREVIR AND PIBRENTASVIR 40; 100 MG/1; MG/1
TABLET, FILM COATED ORAL
COMMUNITY
Start: 2018-01-09 | End: 2018-03-30

## 2018-03-30 NOTE — PROGRESS NOTES
Assessment/Plan:      Diagnoses and all orders for this visit:    Acute non-recurrent maxillary sinusitis  -     doxycycline hyclate (VIBRAMYCIN) 100 mg capsule; Take 1 capsule (100 mg total) by mouth every 12 (twelve) hours for 10 days      I suspect that the patient has a bacterial sinusitis  I prescribed antibiotics and encouraged medication for sx relief  Rest and fluids encouraged as well  Subjective:     Patient ID: Jules Mercado is a 59 y o  male  The pt is here because he has not felt well for 2 weeks  2 weeks ago vomited once and the next day felt better  Then he got a migraine that was terrible  Then he developed a bad cold in the chest hurt when he coughed  Then moved into his sinuses  The stuff coming out of his nose is dark and smells like sewage  Terrible sinus pain and pressure  Feels pain in his teeth  mucinex DM helps a little        Review of Systems      The following portions of the patient's history were reviewed and updated as appropriate: allergies, current medications, past family history, past medical history, past social history, past surgical history and problem list     Objective:  Vitals:    03/30/18 1219   BP: 120/82   Pulse: 86   Temp: 98 9 °F (37 2 °C)   SpO2: 96%   Weight: 67 8 kg (149 lb 8 oz)   Height: 5' 9" (1 753 m)      Physical Exam   Constitutional: He is oriented to person, place, and time  Vital signs are normal  He appears well-developed and well-nourished  He appears ill  HENT:   Head: Normocephalic and atraumatic  Right Ear: Tympanic membrane and external ear normal    Left Ear: Tympanic membrane and external ear normal    Nose: Mucosal edema and sinus tenderness present  No rhinorrhea  Right sinus exhibits maxillary sinus tenderness and frontal sinus tenderness  Left sinus exhibits maxillary sinus tenderness and frontal sinus tenderness  Mouth/Throat: Mucous membranes are normal  Posterior oropharyngeal erythema present   No oropharyngeal exudate, posterior oropharyngeal edema or tonsillar abscesses  Eyes: Conjunctivae and lids are normal    Pulmonary/Chest: Effort normal and breath sounds normal    Lymphadenopathy:     He has cervical adenopathy  Neurological: He is alert and oriented to person, place, and time  Skin: Skin is warm, dry and intact  Psychiatric: He has a normal mood and affect   Thought content normal

## 2018-03-30 NOTE — PATIENT INSTRUCTIONS
Rhinosinusitis   WHAT YOU NEED TO KNOW:   Rhinosinusitis (RS) is inflammation of your nose and sinuses  It commonly begins as a virus, often as a common cold  Viruses usually last 7 to 10 days and do not need treatment  When the virus does not get better on its own, you may have bacterial RS  This means that bacteria have begun to grow inside your sinuses  Acute RS lasts less than 4 weeks  Chronic RS lasts 12 weeks or more  Recurrent RS is when you have 4 or more episodes of RS in one year  DISCHARGE INSTRUCTIONS:   Return to the emergency department if:   · Your eye and eyelid are red, swollen, and painful  · You cannot open your eye  · You have double vision or you cannot see  · Your eyeball bulges out or you cannot move your eye  · You are more sleepy than normal or you notice changes in your ability to think, move, or talk  · You have a stiff neck, a fever, or a bad headache  · You have swelling of your forehead or scalp  Contact your healthcare provider if:   · Your symptoms are worse or do not improve after 3 to 5 days of treatment  · You have questions or concerns about your condition or care  Medicines: You may need any of the following:  · Acetaminophen  decreases pain and fever  It is available without a doctor's order  Ask how much to take and how often to take it  Follow directions  Acetaminophen can cause liver damage if not taken correctly  · NSAIDs , such as ibuprofen, help decrease swelling, pain, and fever  This medicine is available with or without a doctor's order  NSAIDs can cause stomach bleeding or kidney problems in certain people  If you take blood thinner medicine, always ask your healthcare provider if NSAIDs are safe for you  Always read the medicine label and follow directions  · Nasal steroid sprays  decrease inflammation in your nose and sinuses  · Decongestants  reduce swelling and drain mucus in the nose and sinuses   They may help you breathe easier  · Antihistamines  dry mucus in the nose and relieve sneezing  · Antibiotics  treat a bacterial infection and may be needed if your symptoms do not improve or they get worse  · Take your medicine as directed  Contact your healthcare provider if you think your medicine is not helping or if you have side effects  Tell him or her if you are allergic to any medicine  Keep a list of the medicines, vitamins, and herbs you take  Include the amounts, and when and why you take them  Bring the list or the pill bottles to follow-up visits  Carry your medicine list with you in case of an emergency  Self-care:   · Rinse your sinuses  Use a sinus rinse device to rinse your nasal passages with a saline (salt water) solution  This will help thin the mucus in your nose and rinse away pollen and dirt  It will also help reduce swelling so you can breathe normally  Ask your healthcare provider how often to do this  · Breathe in steam   Heat a bowl of water until you see steam  Lean over the bowl and make a tent over your head with a large towel  Breathe deeply for about 20 minutes  Be careful not to get too close to the steam or burn yourself  Do this 3 times a day  You can also breathe deeply when you take a hot shower  · Sleep with your head elevated  Place an extra pillow under your head before you go to sleep to help your sinuses drain  · Drink liquids as directed  Ask your healthcare provider how much liquid to drink each day and which liquids are best for you  Liquids will thin the mucus in your nose and help it drain  Avoid drinks that contain alcohol or caffeine  · Do not smoke, and avoid secondhand smoke  Nicotine and other chemicals in cigarettes and cigars can make your symptoms worse  Ask your healthcare provider for information if you currently smoke and need help to quit  E-cigarettes or smokeless tobacco still contain nicotine   Talk to your healthcare provider before you use these products  Follow up with your healthcare provider as directed: Follow up if your symptoms are worse or not better after 3 to 5 days of treatment  Write down your questions so you remember to ask them during your visits  © 2017 [employer] Information is for End User's use only and may not be sold, redistributed or otherwise used for commercial purposes  All illustrations and images included in CareNotes® are the copyrighted property of [employer]  or [address]  The above information is an  only  It is not intended as medical advice for individual conditions or treatments  Talk to your doctor, nurse or pharmacist before following any medical regimen to see if it is safe and effective for you

## 2018-04-12 DIAGNOSIS — F41.9 ANXIETY: ICD-10-CM

## 2018-04-12 RX ORDER — ALPRAZOLAM 0.25 MG/1
0.25 TABLET ORAL 4 TIMES DAILY PRN
Qty: 120 TABLET | Refills: 0 | Status: SHIPPED | OUTPATIENT
Start: 2018-04-12 | End: 2018-05-11 | Stop reason: SDUPTHER

## 2018-04-30 DIAGNOSIS — G89.4 CHRONIC PAIN SYNDROME: ICD-10-CM

## 2018-04-30 RX ORDER — TRAMADOL HYDROCHLORIDE 50 MG/1
50 TABLET ORAL 4 TIMES DAILY
Qty: 360 TABLET | Refills: 0 | Status: SHIPPED | OUTPATIENT
Start: 2018-04-30 | End: 2018-09-07 | Stop reason: SDUPTHER

## 2018-05-02 ENCOUNTER — OFFICE VISIT (OUTPATIENT)
Dept: FAMILY MEDICINE CLINIC | Facility: CLINIC | Age: 58
End: 2018-05-02
Payer: MEDICARE

## 2018-05-02 VITALS
OXYGEN SATURATION: 98 % | TEMPERATURE: 98.5 F | HEIGHT: 69 IN | HEART RATE: 63 BPM | BODY MASS INDEX: 22.74 KG/M2 | SYSTOLIC BLOOD PRESSURE: 124 MMHG | WEIGHT: 153.5 LBS | DIASTOLIC BLOOD PRESSURE: 80 MMHG

## 2018-05-02 DIAGNOSIS — J01.11 ACUTE RECURRENT FRONTAL SINUSITIS: Primary | ICD-10-CM

## 2018-05-02 DIAGNOSIS — R35.0 BENIGN PROSTATIC HYPERPLASIA WITH URINARY FREQUENCY: ICD-10-CM

## 2018-05-02 DIAGNOSIS — N40.1 BENIGN PROSTATIC HYPERPLASIA WITH URINARY FREQUENCY: ICD-10-CM

## 2018-05-02 DIAGNOSIS — F33.0 MILD EPISODE OF RECURRENT MAJOR DEPRESSIVE DISORDER (HCC): ICD-10-CM

## 2018-05-02 PROCEDURE — 99213 OFFICE O/P EST LOW 20 MIN: CPT | Performed by: FAMILY MEDICINE

## 2018-05-02 RX ORDER — LEVOFLOXACIN 500 MG/1
500 TABLET, FILM COATED ORAL EVERY 24 HOURS
Qty: 14 TABLET | Refills: 0 | Status: SHIPPED | OUTPATIENT
Start: 2018-05-02 | End: 2018-05-12

## 2018-05-03 NOTE — PROGRESS NOTES
Assessment/Plan:      Diagnoses and all orders for this visit:    Acute recurrent frontal sinusitis  -     levofloxacin (LEVAQUIN) 500 mg tablet; Take 1 tablet (500 mg total) by mouth every 24 hours for 10 days    Mild episode of recurrent major depressive disorder (HCC)    Benign prostatic hyperplasia with urinary frequency        Sinusitis start Levaquin 1 tablet daily  Observe for improvement in symptoms  BPH:  Keep follow-up with Urology  Subjective:     Patient ID: Ab Telles is a 59 y o  male  Patient complains of symptoms over the past 3 weeks  He complains of left-sided sinus pressure and postnasal drip  He started doxycycline 1 tablet twice a day and finished a 10 day course with some relief but not complete resolution still with thick postnasal drip that is foul smelling  He still complains of urinary symptoms, increased frequency without burning from prostate issues and is following with Urology  Review of Systems   Constitutional: Positive for fatigue  Negative for fever  HENT: Positive for congestion, postnasal drip and sinus pressure  Eyes: Negative  Respiratory: Negative  Negative for cough  Cardiovascular: Negative  Gastrointestinal: Negative  Endocrine: Negative  Genitourinary: Positive for frequency  Negative for dysuria  Musculoskeletal: Negative  Skin: Negative  Allergic/Immunologic: Negative  Neurological: Negative  Hematological: Negative  Psychiatric/Behavioral: Positive for dysphoric mood           The following portions of the patient's history were reviewed and updated as appropriate: allergies, current medications, past family history, past medical history, past social history, past surgical history and problem list     Objective:  Vitals:    05/02/18 1635   BP: 124/80   Pulse: 63   Temp: 98 5 °F (36 9 °C)   SpO2: 98%   Weight: 69 6 kg (153 lb 8 oz)   Height: 5' 9" (1 753 m)      Physical Exam   Constitutional: He is oriented to person, place, and time  He appears well-developed and well-nourished  HENT:   Head: Normocephalic and atraumatic  Nasal congestion  Drainage posterior pharynx   Cardiovascular: Normal rate, regular rhythm and normal heart sounds  Pulmonary/Chest: Effort normal and breath sounds normal    Abdominal: Soft  Bowel sounds are normal    Neurological: He is alert and oriented to person, place, and time  Skin: Skin is warm and dry  Psychiatric: His behavior is normal  Judgment normal    Nursing note and vitals reviewed  normal balance

## 2018-05-04 ENCOUNTER — TELEPHONE (OUTPATIENT)
Dept: FAMILY MEDICINE CLINIC | Facility: CLINIC | Age: 58
End: 2018-05-04

## 2018-05-11 DIAGNOSIS — F41.9 ANXIETY: ICD-10-CM

## 2018-05-11 RX ORDER — ALPRAZOLAM 0.25 MG/1
0.25 TABLET ORAL 4 TIMES DAILY PRN
Qty: 120 TABLET | Refills: 0 | Status: SHIPPED | OUTPATIENT
Start: 2018-05-11 | End: 2018-06-11 | Stop reason: SDUPTHER

## 2018-06-11 DIAGNOSIS — F41.9 ANXIETY: ICD-10-CM

## 2018-06-11 RX ORDER — ALPRAZOLAM 0.25 MG/1
0.25 TABLET ORAL 4 TIMES DAILY PRN
Qty: 120 TABLET | Refills: 0 | Status: SHIPPED | OUTPATIENT
Start: 2018-06-11 | End: 2018-07-13 | Stop reason: SDUPTHER

## 2018-06-19 DIAGNOSIS — R35.0 BENIGN PROSTATIC HYPERPLASIA WITH URINARY FREQUENCY: ICD-10-CM

## 2018-06-19 DIAGNOSIS — N40.1 BENIGN PROSTATIC HYPERPLASIA WITH URINARY FREQUENCY: ICD-10-CM

## 2018-06-20 RX ORDER — TAMSULOSIN HYDROCHLORIDE 0.4 MG/1
CAPSULE ORAL
Qty: 180 CAPSULE | Refills: 0 | Status: SHIPPED | OUTPATIENT
Start: 2018-06-20 | End: 2019-10-14 | Stop reason: CLARIF

## 2018-07-13 DIAGNOSIS — F41.9 ANXIETY: ICD-10-CM

## 2018-07-13 RX ORDER — ALPRAZOLAM 0.25 MG/1
0.25 TABLET ORAL 4 TIMES DAILY PRN
Qty: 120 TABLET | Refills: 0 | Status: SHIPPED | OUTPATIENT
Start: 2018-07-13 | End: 2018-08-15 | Stop reason: SDUPTHER

## 2018-07-23 DIAGNOSIS — M47.22 OSTEOARTHRITIS OF SPINE WITH RADICULOPATHY, CERVICAL REGION: Primary | ICD-10-CM

## 2018-07-23 RX ORDER — MELOXICAM 15 MG/1
TABLET ORAL
Qty: 30 TABLET | Refills: 5 | Status: SHIPPED | OUTPATIENT
Start: 2018-07-23 | End: 2019-01-13 | Stop reason: SDUPTHER

## 2018-07-24 ENCOUNTER — TELEPHONE (OUTPATIENT)
Dept: FAMILY MEDICINE CLINIC | Facility: CLINIC | Age: 58
End: 2018-07-24

## 2018-07-24 DIAGNOSIS — J32.8 OTHER CHRONIC SINUSITIS: Primary | ICD-10-CM

## 2018-07-24 RX ORDER — LEVOFLOXACIN 500 MG/1
500 TABLET, FILM COATED ORAL EVERY 24 HOURS
Qty: 10 TABLET | Refills: 0 | Status: SHIPPED | OUTPATIENT
Start: 2018-07-24 | End: 2018-08-03

## 2018-07-24 NOTE — TELEPHONE ENCOUNTER
Patient called asking you to either call him or call in an abx to [facility]  States he has had a sinus infection since march and has seen an ent and had a molar pulled, but still not better  Please advise

## 2018-08-15 DIAGNOSIS — F41.9 ANXIETY: ICD-10-CM

## 2018-08-16 RX ORDER — ALPRAZOLAM 0.25 MG/1
0.25 TABLET ORAL 4 TIMES DAILY PRN
Qty: 120 TABLET | Refills: 0 | Status: SHIPPED | OUTPATIENT
Start: 2018-08-16 | End: 2018-09-18 | Stop reason: SDUPTHER

## 2018-09-07 DIAGNOSIS — G89.4 CHRONIC PAIN SYNDROME: ICD-10-CM

## 2018-09-07 RX ORDER — TRAMADOL HYDROCHLORIDE 50 MG/1
50 TABLET ORAL 4 TIMES DAILY
Qty: 360 TABLET | Refills: 0 | Status: SHIPPED | OUTPATIENT
Start: 2018-09-07 | End: 2018-11-20 | Stop reason: SDUPTHER

## 2018-09-14 DIAGNOSIS — R35.0 BENIGN PROSTATIC HYPERPLASIA WITH URINARY FREQUENCY: ICD-10-CM

## 2018-09-14 DIAGNOSIS — N40.1 BENIGN PROSTATIC HYPERPLASIA WITH URINARY FREQUENCY: ICD-10-CM

## 2018-09-14 RX ORDER — TAMSULOSIN HYDROCHLORIDE 0.4 MG/1
0.8 CAPSULE ORAL
Qty: 180 CAPSULE | Refills: 0 | Status: SHIPPED | OUTPATIENT
Start: 2018-09-14 | End: 2019-01-31 | Stop reason: CLARIF

## 2018-09-14 NOTE — TELEPHONE ENCOUNTER
Patient due for labs  Patient has Medicare   Which lab  Should I put orders in for?   Requesting med renewal

## 2018-09-18 DIAGNOSIS — F41.9 ANXIETY: ICD-10-CM

## 2018-09-18 RX ORDER — ALPRAZOLAM 0.25 MG/1
0.25 TABLET ORAL 4 TIMES DAILY PRN
Qty: 120 TABLET | Refills: 0 | Status: SHIPPED | OUTPATIENT
Start: 2018-09-18 | End: 2018-10-22 | Stop reason: SDUPTHER

## 2018-09-18 RX ORDER — ALPRAZOLAM 0.25 MG/1
0.25 TABLET ORAL 4 TIMES DAILY PRN
Qty: 120 TABLET | Refills: 0 | OUTPATIENT
Start: 2018-09-18

## 2018-10-22 DIAGNOSIS — F41.9 ANXIETY: ICD-10-CM

## 2018-10-22 RX ORDER — ALPRAZOLAM 0.25 MG/1
0.25 TABLET ORAL 4 TIMES DAILY PRN
Qty: 120 TABLET | Refills: 0 | Status: SHIPPED | OUTPATIENT
Start: 2018-10-22 | End: 2018-11-20 | Stop reason: SDUPTHER

## 2018-11-20 DIAGNOSIS — F41.9 ANXIETY: ICD-10-CM

## 2018-11-20 DIAGNOSIS — G89.4 CHRONIC PAIN SYNDROME: ICD-10-CM

## 2018-11-20 RX ORDER — ALPRAZOLAM 0.25 MG/1
0.25 TABLET ORAL 4 TIMES DAILY PRN
Qty: 120 TABLET | Refills: 0 | Status: SHIPPED | OUTPATIENT
Start: 2018-11-20 | End: 2018-12-26 | Stop reason: SDUPTHER

## 2018-11-20 RX ORDER — TRAMADOL HYDROCHLORIDE 50 MG/1
50 TABLET ORAL 4 TIMES DAILY
Qty: 360 TABLET | Refills: 0 | Status: SHIPPED | OUTPATIENT
Start: 2018-11-20 | End: 2019-10-14 | Stop reason: CLARIF

## 2018-12-13 ENCOUNTER — TELEPHONE (OUTPATIENT)
Dept: FAMILY MEDICINE CLINIC | Facility: CLINIC | Age: 58
End: 2018-12-13

## 2018-12-13 DIAGNOSIS — R35.0 BENIGN PROSTATIC HYPERPLASIA WITH URINARY FREQUENCY: ICD-10-CM

## 2018-12-13 DIAGNOSIS — Z13.1 SCREENING FOR DIABETES MELLITUS (DM): ICD-10-CM

## 2018-12-13 DIAGNOSIS — N40.1 BENIGN PROSTATIC HYPERPLASIA WITH URINARY FREQUENCY: ICD-10-CM

## 2018-12-13 DIAGNOSIS — B18.2 CHRONIC HEPATITIS C WITHOUT HEPATIC COMA (HCC): Primary | ICD-10-CM

## 2018-12-13 DIAGNOSIS — R53.82 CHRONIC FATIGUE: ICD-10-CM

## 2018-12-13 DIAGNOSIS — E78.49 OTHER HYPERLIPIDEMIA: ICD-10-CM

## 2018-12-13 DIAGNOSIS — F41.1 GENERALIZED ANXIETY DISORDER: ICD-10-CM

## 2018-12-13 DIAGNOSIS — R97.20 ELEVATED PSA: ICD-10-CM

## 2018-12-13 NOTE — TELEPHONE ENCOUNTER
Patient states he was told there were labs ready for him at [address] [place] [address], he went this morning and no labs were pended, so he even went to check at [facility]     Patient will now go to [address] [place] [address] next week, we need to call him when done so he is completely sure the labs are there        [phone]

## 2018-12-26 DIAGNOSIS — F41.9 ANXIETY: ICD-10-CM

## 2018-12-27 RX ORDER — ALPRAZOLAM 0.25 MG/1
0.25 TABLET ORAL 4 TIMES DAILY PRN
Qty: 120 TABLET | Refills: 0 | Status: SHIPPED | OUTPATIENT
Start: 2018-12-27 | End: 2019-01-29 | Stop reason: SDUPTHER

## 2019-01-13 DIAGNOSIS — M47.22 OSTEOARTHRITIS OF SPINE WITH RADICULOPATHY, CERVICAL REGION: ICD-10-CM

## 2019-01-14 RX ORDER — MELOXICAM 15 MG/1
TABLET ORAL
Qty: 30 TABLET | Refills: 0 | Status: SHIPPED | OUTPATIENT
Start: 2019-01-14 | End: 2019-02-13 | Stop reason: SDUPTHER

## 2019-01-17 LAB
ALBUMIN SERPL-MCNC: 4.3 G/DL (ref 3.6–5.1)
ALBUMIN/GLOB SERPL: 1.6 (CALC) (ref 1–2.5)
ALP SERPL-CCNC: 81 U/L (ref 40–115)
ALT SERPL-CCNC: 8 U/L (ref 9–46)
AST SERPL-CCNC: 20 U/L (ref 10–35)
BASOPHILS # BLD AUTO: 49 CELLS/UL (ref 0–200)
BASOPHILS NFR BLD AUTO: 0.7 %
BILIRUB SERPL-MCNC: 0.6 MG/DL (ref 0.2–1.2)
BUN SERPL-MCNC: 25 MG/DL (ref 7–25)
BUN/CREAT SERPL: ABNORMAL (CALC) (ref 6–22)
CALCIUM SERPL-MCNC: 9.6 MG/DL (ref 8.6–10.3)
CHLORIDE SERPL-SCNC: 104 MMOL/L (ref 98–110)
CHOLEST SERPL-MCNC: 192 MG/DL
CHOLEST/HDLC SERPL: 3.6 (CALC)
CO2 SERPL-SCNC: 27 MMOL/L (ref 20–32)
CREAT SERPL-MCNC: 1.01 MG/DL (ref 0.7–1.25)
EOSINOPHIL # BLD AUTO: 147 CELLS/UL (ref 15–500)
EOSINOPHIL NFR BLD AUTO: 2.1 %
ERYTHROCYTE [DISTWIDTH] IN BLOOD BY AUTOMATED COUNT: 12.5 % (ref 11–15)
GLOBULIN SER CALC-MCNC: 2.7 G/DL (CALC) (ref 1.9–3.7)
GLUCOSE SERPL-MCNC: 103 MG/DL (ref 65–99)
HCT VFR BLD AUTO: 45.1 % (ref 38.5–50)
HDLC SERPL-MCNC: 53 MG/DL
HGB BLD-MCNC: 14.5 G/DL (ref 13.2–17.1)
LDLC SERPL CALC-MCNC: 121 MG/DL (CALC)
LYMPHOCYTES # BLD AUTO: 1953 CELLS/UL (ref 850–3900)
LYMPHOCYTES NFR BLD AUTO: 27.9 %
MCH RBC QN AUTO: 27.3 PG (ref 27–33)
MCHC RBC AUTO-ENTMCNC: 32.2 G/DL (ref 32–36)
MCV RBC AUTO: 84.9 FL (ref 80–100)
MONOCYTES # BLD AUTO: 483 CELLS/UL (ref 200–950)
MONOCYTES NFR BLD AUTO: 6.9 %
NEUTROPHILS # BLD AUTO: 4368 CELLS/UL (ref 1500–7800)
NEUTROPHILS NFR BLD AUTO: 62.4 %
NONHDLC SERPL-MCNC: 139 MG/DL (CALC)
PLATELET # BLD AUTO: 274 THOUSAND/UL (ref 140–400)
PMV BLD REES-ECKER: 10 FL (ref 7.5–12.5)
POTASSIUM SERPL-SCNC: 4.6 MMOL/L (ref 3.5–5.3)
PROT SERPL-MCNC: 7 G/DL (ref 6.1–8.1)
PSA SERPL DL<=0.01 NG/ML-MCNC: 7.61 NG/ML
RBC # BLD AUTO: 5.31 MILLION/UL (ref 4.2–5.8)
SL AMB EGFR AFRICAN AMERICAN: 93 ML/MIN/1.73M2
SL AMB EGFR NON AFRICAN AMERICAN: 80 ML/MIN/1.73M2
SODIUM SERPL-SCNC: 139 MMOL/L (ref 135–146)
TRIGL SERPL-MCNC: 81 MG/DL
TSH SERPL-ACNC: 2.03 MIU/L (ref 0.4–4.5)
WBC # BLD AUTO: 7 THOUSAND/UL (ref 3.8–10.8)

## 2019-01-18 DIAGNOSIS — R97.20 ELEVATED PSA: Primary | ICD-10-CM

## 2019-01-21 RX ORDER — FINASTERIDE 5 MG/1
5 TABLET, FILM COATED ORAL DAILY
Refills: 3 | COMMUNITY
Start: 2018-12-26

## 2019-01-29 DIAGNOSIS — F41.9 ANXIETY: ICD-10-CM

## 2019-01-29 RX ORDER — ALPRAZOLAM 0.25 MG/1
0.25 TABLET ORAL 4 TIMES DAILY PRN
Qty: 120 TABLET | Refills: 0 | Status: SHIPPED | OUTPATIENT
Start: 2019-01-29 | End: 2019-03-03 | Stop reason: SDUPTHER

## 2019-01-31 ENCOUNTER — OFFICE VISIT (OUTPATIENT)
Dept: FAMILY MEDICINE CLINIC | Facility: CLINIC | Age: 59
End: 2019-01-31
Payer: COMMERCIAL

## 2019-01-31 VITALS
WEIGHT: 157 LBS | BODY MASS INDEX: 23.25 KG/M2 | DIASTOLIC BLOOD PRESSURE: 76 MMHG | SYSTOLIC BLOOD PRESSURE: 142 MMHG | OXYGEN SATURATION: 98 % | HEIGHT: 69 IN | TEMPERATURE: 97.5 F | HEART RATE: 67 BPM

## 2019-01-31 DIAGNOSIS — M54.2 CHRONIC NECK PAIN: ICD-10-CM

## 2019-01-31 DIAGNOSIS — F33.0 MILD EPISODE OF RECURRENT MAJOR DEPRESSIVE DISORDER (HCC): ICD-10-CM

## 2019-01-31 DIAGNOSIS — F41.1 GENERALIZED ANXIETY DISORDER: ICD-10-CM

## 2019-01-31 DIAGNOSIS — R35.0 BENIGN PROSTATIC HYPERPLASIA WITH URINARY FREQUENCY: Primary | ICD-10-CM

## 2019-01-31 DIAGNOSIS — N40.1 BENIGN PROSTATIC HYPERPLASIA WITH URINARY FREQUENCY: Primary | ICD-10-CM

## 2019-01-31 DIAGNOSIS — G89.29 CHRONIC NECK PAIN: ICD-10-CM

## 2019-01-31 PROBLEM — J01.11 ACUTE RECURRENT FRONTAL SINUSITIS: Status: RESOLVED | Noted: 2018-05-02 | Resolved: 2019-01-31

## 2019-01-31 PROBLEM — Z13.1 SCREENING FOR DIABETES MELLITUS (DM): Status: RESOLVED | Noted: 2018-12-13 | Resolved: 2019-01-31

## 2019-01-31 PROCEDURE — 99213 OFFICE O/P EST LOW 20 MIN: CPT | Performed by: FAMILY MEDICINE

## 2019-01-31 PROCEDURE — 3008F BODY MASS INDEX DOCD: CPT | Performed by: FAMILY MEDICINE

## 2019-01-31 NOTE — PROGRESS NOTES
Subjective:   Chief Complaint   Patient presents with    Follow-up     discuss lab results and also patient states that he has been having prostate issues that he needs to discuss with you  Patient ID: Rock Cleveland is a 59 y o  male  Pt is here to follow up on recent blood work  States he has been eating healthy  Complains of trouble urinating  Trouble starting stream, takes multiple attempts to empty completely  Has seen urologist, Dr Meenu Regalado  Had prostate biopsy  Started on tamsulosin about 4 months ago without much improvement  Has depressive symptoms  Very upset about the divorce from his wife and no contact with his step children from the relationship and misses his dog  Would like his cervical traction unit that his ex wife has but not speaking to her  Unable to afford another cervical traction unit  Pt has chronic neck pain and this has helped in the past  Is willing to speak with our counselor but not today  Rest of blood work is normal except psa, reviewed with patient  The following portions of the patient's history were reviewed and updated as appropriate: allergies, current medications, past family history, past medical history, past social history, past surgical history and problem list     Review of Systems   Constitutional: Negative  Negative for fatigue and fever  HENT: Negative  Eyes: Negative  Respiratory: Negative  Negative for cough  Cardiovascular: Negative  Gastrointestinal: Negative  Endocrine: Negative  Genitourinary: Negative  Musculoskeletal: Negative  Skin: Negative  Allergic/Immunologic: Negative  Neurological: Negative  Psychiatric/Behavioral: Positive for dysphoric mood and sleep disturbance  The patient is nervous/anxious            Objective:  Vitals:    01/31/19 0842   BP: 142/76   Pulse: 67   Temp: 97 5 °F (36 4 °C)   SpO2: 98%   Weight: 71 2 kg (157 lb)   Height: 5' 9" (1 753 m)      Physical Exam   Constitutional: He is oriented to person, place, and time  He appears well-developed and well-nourished  HENT:   Head: Normocephalic and atraumatic  Cardiovascular: Normal rate, regular rhythm and normal heart sounds  Pulmonary/Chest: Effort normal and breath sounds normal    Abdominal: Soft  Bowel sounds are normal    Neurological: He is alert and oriented to person, place, and time  Skin: Skin is warm and dry  Psychiatric: He has a normal mood and affect  His behavior is normal  Judgment and thought content normal    Nursing note and vitals reviewed  Assessment/Plan:    No problem-specific Assessment & Plan notes found for this encounter  Diagnoses and all orders for this visit:    Benign prostatic hyperplasia with urinary frequency  Comments:  surgical intervention, still symptomatic with tamsulosin   Dr Nigel Sheriff    Chronic neck pain  Comments:  will follow up after prostate surgery    Mild episode of recurrent major depressive disorder [facility])  Comments:  pt willing to speak with counselor in office, will schedule appt    Generalized anxiety disorder  Comments:  continue current medication as needed, alprazolam

## 2019-02-13 DIAGNOSIS — M47.22 OSTEOARTHRITIS OF SPINE WITH RADICULOPATHY, CERVICAL REGION: ICD-10-CM

## 2019-02-13 RX ORDER — MELOXICAM 15 MG/1
TABLET ORAL
Qty: 30 TABLET | Refills: 5 | Status: SHIPPED | OUTPATIENT
Start: 2019-02-13 | End: 2019-08-17 | Stop reason: SDUPTHER

## 2019-02-19 DIAGNOSIS — R35.0 BENIGN PROSTATIC HYPERPLASIA WITH URINARY FREQUENCY: ICD-10-CM

## 2019-02-19 DIAGNOSIS — N40.1 BENIGN PROSTATIC HYPERPLASIA WITH URINARY FREQUENCY: ICD-10-CM

## 2019-02-19 RX ORDER — TAMSULOSIN HYDROCHLORIDE 0.4 MG/1
0.8 CAPSULE ORAL
Qty: 180 CAPSULE | Refills: 3 | Status: SHIPPED | OUTPATIENT
Start: 2019-02-19 | End: 2020-09-30 | Stop reason: ALTCHOICE

## 2019-03-03 DIAGNOSIS — F41.9 ANXIETY: ICD-10-CM

## 2019-03-04 RX ORDER — ALPRAZOLAM 0.25 MG/1
0.25 TABLET ORAL 4 TIMES DAILY PRN
Qty: 120 TABLET | Refills: 0 | Status: SHIPPED | OUTPATIENT
Start: 2019-03-04 | End: 2019-04-03 | Stop reason: SDUPTHER

## 2019-04-03 DIAGNOSIS — F41.9 ANXIETY: ICD-10-CM

## 2019-04-04 RX ORDER — ALPRAZOLAM 0.25 MG/1
0.25 TABLET ORAL 4 TIMES DAILY PRN
Qty: 120 TABLET | Refills: 0 | Status: SHIPPED | OUTPATIENT
Start: 2019-04-04 | End: 2019-05-07 | Stop reason: SDUPTHER

## 2019-04-09 DIAGNOSIS — G89.4 CHRONIC PAIN SYNDROME: ICD-10-CM

## 2019-04-09 RX ORDER — TRAMADOL HYDROCHLORIDE 50 MG/1
50 TABLET ORAL 4 TIMES DAILY
Qty: 360 TABLET | Refills: 0 | Status: SHIPPED | OUTPATIENT
Start: 2019-04-09 | End: 2019-07-30 | Stop reason: SDUPTHER

## 2019-05-07 DIAGNOSIS — F41.9 ANXIETY: ICD-10-CM

## 2019-05-07 RX ORDER — ALPRAZOLAM 0.25 MG/1
0.25 TABLET ORAL 4 TIMES DAILY PRN
Qty: 120 TABLET | Refills: 0 | Status: SHIPPED | OUTPATIENT
Start: 2019-05-07 | End: 2019-08-06 | Stop reason: SDUPTHER

## 2019-07-30 DIAGNOSIS — G89.4 CHRONIC PAIN SYNDROME: ICD-10-CM

## 2019-07-30 RX ORDER — TRAMADOL HYDROCHLORIDE 50 MG/1
50 TABLET ORAL 4 TIMES DAILY
Qty: 120 TABLET | Refills: 2 | Status: SHIPPED | OUTPATIENT
Start: 2019-07-30 | End: 2019-10-28 | Stop reason: SDUPTHER

## 2019-08-06 DIAGNOSIS — F41.9 ANXIETY: ICD-10-CM

## 2019-08-06 RX ORDER — ALPRAZOLAM 0.25 MG/1
0.25 TABLET ORAL 4 TIMES DAILY PRN
Qty: 120 TABLET | Refills: 0 | Status: SHIPPED | OUTPATIENT
Start: 2019-08-06 | End: 2019-10-14 | Stop reason: CLARIF

## 2019-08-17 DIAGNOSIS — M47.22 OSTEOARTHRITIS OF SPINE WITH RADICULOPATHY, CERVICAL REGION: ICD-10-CM

## 2019-08-19 RX ORDER — MELOXICAM 15 MG/1
TABLET ORAL
Qty: 30 TABLET | Refills: 5 | Status: SHIPPED | OUTPATIENT
Start: 2019-08-19 | End: 2020-02-10

## 2019-09-28 DIAGNOSIS — F41.9 ANXIETY: ICD-10-CM

## 2019-09-29 RX ORDER — ALPRAZOLAM 0.25 MG/1
0.25 TABLET ORAL 4 TIMES DAILY PRN
Qty: 120 TABLET | Refills: 0 | Status: SHIPPED | OUTPATIENT
Start: 2019-09-29 | End: 2019-11-01 | Stop reason: SDUPTHER

## 2019-10-14 ENCOUNTER — OFFICE VISIT (OUTPATIENT)
Dept: FAMILY MEDICINE CLINIC | Facility: CLINIC | Age: 59
End: 2019-10-14
Payer: COMMERCIAL

## 2019-10-14 VITALS
DIASTOLIC BLOOD PRESSURE: 80 MMHG | WEIGHT: 149 LBS | SYSTOLIC BLOOD PRESSURE: 130 MMHG | TEMPERATURE: 97.4 F | HEART RATE: 66 BPM | OXYGEN SATURATION: 98 % | BODY MASS INDEX: 22.07 KG/M2 | HEIGHT: 69 IN

## 2019-10-14 DIAGNOSIS — G89.29 CHRONIC NECK PAIN: ICD-10-CM

## 2019-10-14 DIAGNOSIS — M54.2 CHRONIC NECK PAIN: ICD-10-CM

## 2019-10-14 DIAGNOSIS — Z00.00 MEDICARE ANNUAL WELLNESS VISIT, SUBSEQUENT: Primary | ICD-10-CM

## 2019-10-14 DIAGNOSIS — F41.1 GENERALIZED ANXIETY DISORDER: ICD-10-CM

## 2019-10-14 DIAGNOSIS — R97.20 ELEVATED PSA: ICD-10-CM

## 2019-10-14 PROBLEM — N41.0 ACUTE PROSTATITIS: Status: ACTIVE | Noted: 2019-10-14

## 2019-10-14 PROCEDURE — G0439 PPPS, SUBSEQ VISIT: HCPCS | Performed by: FAMILY MEDICINE

## 2019-10-14 PROCEDURE — 3008F BODY MASS INDEX DOCD: CPT | Performed by: FAMILY MEDICINE

## 2019-10-14 PROCEDURE — 99214 OFFICE O/P EST MOD 30 MIN: CPT | Performed by: FAMILY MEDICINE

## 2019-10-14 NOTE — PATIENT INSTRUCTIONS
Blood work after 1/15/2020  appt with Dr Silvana Olmedo  Schedule eye appointment, [facility]   Schedule with Dr Gisselle Morales for trigger point injections in the neck   psa level drawn today, continue tamsulosin   Consider prevnar  Medicare Preventive Visit Patient Instructions  Thank you for completing your Welcome to Medicare Visit or Medicare Annual Wellness Visit today  Your next wellness visit will be due in one year (10/14/2020)  The screening/preventive services that you may require over the next 5-10 years are detailed below  Some tests may not apply to you based off risk factors and/or age  Screening tests ordered at today's visit but not completed yet may show as past due  Also, please note that scanned in results may not display below  Preventive Screenings:  Service Recommendations Previous Testing/Comments   Colorectal Cancer Screening  · Colonoscopy    · Fecal Occult Blood Test (FOBT)/Fecal Immunochemical Test (FIT)  · Fecal DNA/Cologuard Test  · Flexible Sigmoidoscopy Age: 54-65 years old   Colonoscopy: every 10 years (May be performed more frequently if at higher risk)  OR  FOBT/FIT: every 1 year  OR  Cologuard: every 3 years  OR  Sigmoidoscopy: every 5 years  Screening may be recommended earlier than age 48 if at higher risk for colorectal cancer  Also, an individualized decision between you and your healthcare provider will decide whether screening between the ages of 74-80 would be appropriate   Colonoscopy: 06/05/2012  FOBT/FIT: Not on file  Cologuard: Not on file  Sigmoidoscopy: Not on file         Prostate Cancer Screening Individualized decision between patient and health care provider in men between ages of 53-78   Medicare will cover every 12 months beginning on the day after your 50th birthday PSA: 5 3 ng/mL          Hepatitis C Screening Once for adults born between [facility]  More frequently in patients at high risk for Hepatitis C Hep C Antibody: Not on file       Diabetes Screening 1-2 times per year if you're at risk for diabetes or have pre-diabetes Fasting glucose: No results in last 5 years   A1C: No results in last 5 years       Cholesterol Screening Once every 5 years if you don't have a lipid disorder  May order more often based on risk factors  Lipid panel: 01/15/2019          Other Preventive Screenings Covered by Medicare:  1  Abdominal Aortic Aneurysm (AAA) Screening: covered once if your at risk  You're considered to be at risk if you have a family history of AAA or a male between the age of 73-68 who smoking at least 100 cigarettes in your lifetime  2  Lung Cancer Screening: covers low dose CT scan once per year if you meet all of the following conditions: (1) Age 50-69; (2) No signs or symptoms of lung cancer; (3) Current smoker or have quit smoking within the last 15 years; (4) You have a tobacco smoking history of at least 30 pack years (packs per day x number of years you smoked); (5) You get a written order from a healthcare provider  3  Glaucoma Screening: covered annually if you're considered high risk: (1) You have diabetes OR (2) Family history of glaucoma OR (3)  aged 48 and older OR (3)  American aged 72 and older  3  Osteoporosis Screening: covered every 2 years if you meet one of the following conditions: (1) Have a vertebral abnormality; (2) On glucocorticoid therapy for more than 3 months; (3) Have primary hyperparathyroidism; (4) On osteoporosis medications and need to assess response to drug therapy  5  HIV Screening: covered annually if you're between the age of 12-76  Also covered annually if you are younger than 13 and older than 72 with risk factors for HIV infection  For pregnant patients, it is covered up to 3 times per pregnancy      Immunizations:  Immunization Recommendations   Influenza Vaccine Annual influenza vaccination during flu season is recommended for all persons aged >= 6 months who do not have contraindications Pneumococcal Vaccine (Prevnar and Pneumovax)  * Prevnar = PCV13  * Pneumovax = PPSV23 Adults 25-60 years old: 1-3 doses may be recommended based on certain risk factors  Adults 72 years old: Prevnar (PCV13) vaccine recommended followed by Pneumovax (PPSV23) vaccine  If already received PPSV23 since turning 65, then PCV13 recommended at least one year after PPSV23 dose  Hepatitis B Vaccine 3 dose series if at intermediate or high risk (ex: diabetes, end stage renal disease, liver disease)   Tetanus (Td) Vaccine - COST NOT COVERED BY MEDICARE PART B Following completion of primary series, a booster dose should be given every 10 years to maintain immunity against tetanus  Td may also be given as tetanus wound prophylaxis  Tdap Vaccine - COST NOT COVERED BY MEDICARE PART B Recommended at least once for all adults  For pregnant patients, recommended with each pregnancy  Shingles Vaccine (Shingrix) - COST NOT COVERED BY MEDICARE PART B  2 shot series recommended in those aged 48 and above     Health Maintenance Due:      Topic Date Due    CRC Screening: Colonoscopy  06/05/2022    Hepatitis C Screening  Discontinued     Immunizations Due:      Topic Date Due    Pneumococcal Vaccine: Pediatrics (0 to 5 Years) and At-Risk Patients (6 to 59 Years) (1 of 3 - PCV13) 12/09/1966    HEPATITIS B VACCINES (1 of 3 - Risk 3-dose series) 12/09/1977     Advance Directives   What are advance directives? Advance directives are legal documents that state your wishes and plans for medical care  These plans are made ahead of time in case you lose your ability to make decisions for yourself  Advance directives can apply to any medical decision, such as the treatments you want, and if you want to donate organs  What are the types of advance directives? There are many types of advance directives, and each state has rules about how to use them  You may choose a combination of any of the following:  · Living will:   This is a written record of the treatment you want  You can also choose which treatments you do not want, which to limit, and which to stop at a certain time  This includes surgery, medicine, IV fluid, and tube feedings  · Durable power of  for healthcare [place] [facility]): This is a written record that states who you want to make healthcare choices for you when you are unable to make them for yourself  This person, called a proxy, is usually a family member or a friend  You may choose more than 1 proxy  · Do not resuscitate (DNR) order:  A DNR order is used in case your heart stops beating or you stop breathing  It is a request not to have certain forms of treatment, such as CPR  A DNR order may be included in other types of advance directives  · Medical directive: This covers the care that you want if you are in a coma, near death, or unable to make decisions for yourself  You can list the treatments you want for each condition  Treatment may include pain medicine, surgery, blood transfusions, dialysis, IV or tube feedings, and a ventilator (breathing machine)  · Values history: This document has questions about your views, beliefs, and how you feel and think about life  This information can help others choose the care that you would choose  Why are advance directives important? An advance directive helps you control your care  Although spoken wishes may be used, it is better to have your wishes written down  Spoken wishes can be misunderstood, or not followed  Treatments may be given even if you do not want them  An advance directive may make it easier for your family to make difficult choices about your care  © Copyright [employer] 2018 Information is for End User's use only and may not be sold, redistributed or otherwise used for commercial purposes   All illustrations and images included in CareNotes® are the copyrighted property of [employer]  or [facility]

## 2019-10-14 NOTE — PROGRESS NOTES
Assessment and Plan:     Problem List Items Addressed This Visit        Other    Chronic neck pain    Elevated PSA    Relevant Orders    PSA, total and free    Generalized anxiety disorder    Medicare annual wellness visit, subsequent - Primary           Preventive health issues were discussed with patient, and age appropriate screening tests were ordered as noted in patient's After Visit Summary  Personalized health advice and appropriate referrals for health education or preventive services given if needed, as noted in patient's After Visit Summary       History of Present Illness:     Patient presents for Medicare Annual Wellness visit    Patient Care Team:  Margarita Hyde DO as PCP - General  Margarita Hyde DO     Problem List:     Patient Active Problem List   Diagnosis    Benign prostatic hyperplasia with urinary obstruction    Chronic neck pain    Chronic pain    Depression    Elevated PSA    Esophageal reflux    Fatigue    Generalized anxiety disorder    HCV infection    Hyperlipidemia    Acute prostatitis    Medicare annual wellness visit, subsequent      Past Medical and Surgical History:     Past Medical History:   Diagnosis Date    Hiatal hernia     Lyme disease     Mononeuritis of upper limb, unspecified laterality     Neoplasm of uncertain behavior of skin      Past Surgical History:   Procedure Laterality Date    CERVICAL FUSION      Refusion of Vertebrae    HERNIA REPAIR        Family History:     Family History   Problem Relation Age of Onset    Diabetes Mother         Mellitus    No Known Problems Father     Alcohol abuse Family       Social History:     Social History     Socioeconomic History    Marital status:      Spouse name: None    Number of children: None    Years of education: None    Highest education level: None   Occupational History    None   Social Needs    Financial resource strain: None    Food insecurity:     Worry: None     Inability: None    Transportation needs:     Medical: None     Non-medical: None   Tobacco Use    Smoking status: Former Smoker    Smokeless tobacco: Never Used   Substance and Sexual Activity    Alcohol use: No    Drug use: No    Sexual activity: None   Lifestyle    Physical activity:     Days per week: None     Minutes per session: None    Stress: None   Relationships    Social connections:     Talks on phone: None     Gets together: None     Attends [employer]ism service: None     Active member of club or organization: None     Attends meetings of clubs or organizations: None     Relationship status: None    Intimate partner violence:     Fear of current or ex partner: None     Emotionally abused: None     Physically abused: None     Forced sexual activity: None   Other Topics Concern    None   Social History Narrative    None       Medications and Allergies:     Current Outpatient Medications   Medication Sig Dispense Refill    ALPRAZolam (XANAX) 0 25 mg tablet TAKE 1 TABLET (0 25 MG TOTAL) BY MOUTH 4 (FOUR) TIMES A DAY AS NEEDED FOR ANXIETY 120 tablet 0    finasteride (PROSCAR) 5 mg tablet Take 5 mg by mouth daily  3    meloxicam (MOBIC) 15 mg tablet TAKE 1 TABLET DAILY WITH FOOD  30 tablet 5    tamsulosin (FLOMAX) 0 4 mg TAKE 2 CAPSULES (0 8 MG TOTAL) BY MOUTH DAILY WITH DINNER FOR 90 DAYS 180 capsule 3    traMADol (ULTRAM) 50 mg tablet TAKE 1 TABLET (50 MG TOTAL) BY MOUTH 4 (FOUR) TIMES A  tablet 2     No current facility-administered medications for this visit  Allergies   Allergen Reactions    Penicillins Anaphylaxis     Category: Allergy;     Citalopram      Other reaction(s): mental status changes  Category: Adverse Reaction;     Sulfa Antibiotics Itching     Category:  Allergy;     Sulfamethoxazole-Trimethoprim Rash      Immunizations:     Immunization History   Administered Date(s) Administered    INFLUENZA 11/01/2018, 09/14/2019    Influenza Injectable, MDCK, Preservative Free, Quadrivalent, 0 5 mL 08/30/2019    Influenza, injectable, quadrivalent, preservative free 0 5 mL 10/04/2018    Tdap 08/05/2013      Health Maintenance:         Topic Date Due    CRC Screening: Colonoscopy  06/05/2022    Hepatitis C Screening  Discontinued         Topic Date Due    Pneumococcal Vaccine: Pediatrics (0 to 5 Years) and At-Risk Patients (6 to 59 Years) (1 of 3 - PCV13) 12/09/1964    HEPATITIS B VACCINES (1 of 3 - Risk 3-dose series) 12/09/1977      Medicare Health Risk Assessment:     /80   Pulse 66   Temp (!) 97 4 °F (36 3 °C)   Ht 5' 9" (1 753 m)   Wt 67 6 kg (149 lb)   SpO2 98%   BMI 22 00 kg/m²      Zari Muir is here for his Subsequent Wellness visit  Last Medicare Wellness visit information reviewed, patient interviewed and updates made to the record today  Health Risk Assessment:   Patient rates overall health as fair  Patient feels that their physical health rating is same  Eyesight was rated as same  Hearing was rated as same  Patient feels that their emotional and mental health rating is same  Pain experienced in the last 7 days has been some  Patient's pain rating has been 5/10  Patient states that he has experienced weight loss or gain in last 6 months  Depression Screening:   PHQ-2 Score: 0  PHQ-9 Score: 0      Fall Risk Screening: In the past year, patient has experienced: no history of falling in past year      Home Safety:  Patient does not have trouble with stairs inside or outside of their home  Patient has working smoke alarms and has working carbon monoxide detector  Home safety hazards include: none  Nutrition:   Current diet is Regular  Medications:   Patient is not currently taking any over-the-counter supplements  Patient is able to manage medications       Activities of Daily Living (ADLs)/Instrumental Activities of Daily Living (IADLs):   Walk and transfer into and out of bed and chair?: Yes  Dress and groom yourself?: Yes    Bathe or shower yourself?: Yes Feed yourself?  Yes  Do your laundry/housekeeping?: Yes  Manage your money, pay your bills and track your expenses?: Yes  Make your own meals?: Yes    Do your own shopping?: Yes    Previous Hospitalizations:   Any hospitalizations or ED visits within the last 12 months?: No      Advance Care Planning:   Living will: No    Durable POA for healthcare: No    Advanced directive: No      PREVENTIVE SCREENINGS      Cardiovascular Screening:    General: Screening Not Indicated and History Lipid Disorder      Diabetes Screening:     General: Screening Current      Colorectal Cancer Screening:     General: Screening Current      Abdominal Aortic Aneurysm (AAA) Screening:    Risk factors include: tobacco use        Hepatitis C Screening:    General: Screening Not Indicated and History Hepatitis C      Ether Taisha, DO

## 2019-10-14 NOTE — PROGRESS NOTES
Assessment/Plan:      Diagnoses and all orders for this visit:    Medicare annual wellness visit, subsequent    Generalized anxiety disorder  Comments:  Encouraged to continue to cut back on alprazolam gradually  Anxiety has improved    Chronic neck pain  Comments:  Stable, Patient will schedule with Dr Bruce Almanza for possible trigger point injections to the left side of his neck    Elevated PSA  Comments:  History of elevated PSA, improved urination on tamsulosin, will repeat PSA in office today  Schedule appointment with Urology in follow up, Dr Helena Tee   Orders:  -     Cancel: PSA, total and free; Future  -     PSA, total and free          Subjective:  Chief Complaint   Patient presents with    Medication Management     states needs to discuss dosages  States he cut back on some of the meds  Patient ID: Erasto Gregory is a 59 y o  male  Patient is here for Medicare wellness and to review chronic medical conditions  He states he has been trying to wean on the alprazolam, having less anxiety  Pt taking 1 1/2 tabs of alprazolam daily  Patient would like to pursue other treatments for his chronic neck pain  Pt is currently taking 3 tramadol a day for the pain  Neck pain on left side  Would like to try trigger point injection   Patient was seen by the dentist and noted he was Grinding during the day affecting his teeth, he is now wear bite plate, seeing dentist every 6 months  He has not had an eye appointment for an eye evaluation but he will schedule  He heard about a new procedure for BPH and is not interested in the TURP that was offered by his urologist, Dr Helena Tee  He will make a follow-up appointment to discuss  Since starting the tamsulosin, his urination has decreased from 15 times at night to 3  He is sleeping better  He is deferring Prevnar today but will consider  He had his influenza vaccination at the pharmacy      Review of Systems   Constitutional: Negative    Negative for fatigue and fever    HENT: Negative  Eyes: Negative  Respiratory: Negative  Negative for cough  Cardiovascular: Negative  Gastrointestinal: Negative  Endocrine: Negative  Genitourinary: Positive for frequency  Negative for difficulty urinating, dysuria, hematuria and urgency  Musculoskeletal: Positive for neck pain and neck stiffness  Skin: Negative  Allergic/Immunologic: Negative  Neurological: Negative  Psychiatric/Behavioral: Negative for sleep disturbance  The patient is nervous/anxious  The following portions of the patient's history were reviewed and updated as appropriate: allergies, current medications, past family history, past medical history, past social history, past surgical history and problem list     Objective:  Vitals:    10/14/19 0908 10/14/19 0935   BP: 134/82 130/80   Pulse: 66    Temp: (!) 97 4 °F (36 3 °C)    SpO2: 98%    Weight: 67 6 kg (149 lb)    Height: 5' 9" (1 753 m)       Physical Exam   Constitutional: He is oriented to person, place, and time  He appears well-developed and well-nourished  HENT:   Head: Normocephalic and atraumatic  Cardiovascular: Normal rate, regular rhythm, normal heart sounds and intact distal pulses  Pulmonary/Chest: Effort normal and breath sounds normal    Abdominal: Soft  Bowel sounds are normal    Musculoskeletal: He exhibits tenderness  Increase tension left paraspinal cervical C2 through C6, limited range of motion   Neurological: He is alert and oriented to person, place, and time  Skin: Skin is warm and dry  Psychiatric: He has a normal mood and affect  His behavior is normal  Judgment and thought content normal    Nursing note and vitals reviewed

## 2019-10-15 LAB
PSA FREE MFR SERPL: 21 % (CALC)
PSA FREE SERPL-MCNC: 0.8 NG/ML
PSA SERPL-MCNC: 3.9 NG/ML

## 2019-10-28 DIAGNOSIS — G89.4 CHRONIC PAIN SYNDROME: ICD-10-CM

## 2019-10-28 RX ORDER — TRAMADOL HYDROCHLORIDE 50 MG/1
50 TABLET ORAL 4 TIMES DAILY
Qty: 120 TABLET | Refills: 0 | Status: SHIPPED | OUTPATIENT
Start: 2019-10-28 | End: 2019-11-26 | Stop reason: SDUPTHER

## 2019-11-01 DIAGNOSIS — F41.9 ANXIETY: ICD-10-CM

## 2019-11-01 RX ORDER — ALPRAZOLAM 0.25 MG/1
0.25 TABLET ORAL 4 TIMES DAILY PRN
Qty: 120 TABLET | Refills: 0 | Status: SHIPPED | OUTPATIENT
Start: 2019-11-01 | End: 2019-12-15 | Stop reason: SDUPTHER

## 2019-11-26 DIAGNOSIS — G89.4 CHRONIC PAIN SYNDROME: ICD-10-CM

## 2019-11-27 RX ORDER — TRAMADOL HYDROCHLORIDE 50 MG/1
50 TABLET ORAL 4 TIMES DAILY
Qty: 120 TABLET | Refills: 0 | Status: SHIPPED | OUTPATIENT
Start: 2019-11-27 | End: 2019-12-23 | Stop reason: SDUPTHER

## 2019-12-15 DIAGNOSIS — F41.9 ANXIETY: ICD-10-CM

## 2019-12-16 RX ORDER — ALPRAZOLAM 0.25 MG/1
0.25 TABLET ORAL 4 TIMES DAILY PRN
Qty: 120 TABLET | Refills: 0 | Status: SHIPPED | OUTPATIENT
Start: 2019-12-16 | End: 2020-06-01 | Stop reason: ALTCHOICE

## 2019-12-23 DIAGNOSIS — G89.4 CHRONIC PAIN SYNDROME: ICD-10-CM

## 2019-12-23 RX ORDER — TRAMADOL HYDROCHLORIDE 50 MG/1
50 TABLET ORAL 4 TIMES DAILY
Qty: 120 TABLET | Refills: 0 | Status: SHIPPED | OUTPATIENT
Start: 2019-12-23 | End: 2020-02-03

## 2020-02-01 DIAGNOSIS — G89.4 CHRONIC PAIN SYNDROME: ICD-10-CM

## 2020-02-03 RX ORDER — TRAMADOL HYDROCHLORIDE 50 MG/1
50 TABLET ORAL 4 TIMES DAILY
Qty: 120 TABLET | Refills: 0 | Status: SHIPPED | OUTPATIENT
Start: 2020-02-03 | End: 2020-03-17 | Stop reason: SDUPTHER

## 2020-02-09 DIAGNOSIS — M47.22 OSTEOARTHRITIS OF SPINE WITH RADICULOPATHY, CERVICAL REGION: ICD-10-CM

## 2020-02-10 RX ORDER — MELOXICAM 15 MG/1
TABLET ORAL
Qty: 30 TABLET | Refills: 5 | Status: SHIPPED | OUTPATIENT
Start: 2020-02-10 | End: 2020-08-10 | Stop reason: SDUPTHER

## 2020-03-17 DIAGNOSIS — G89.4 CHRONIC PAIN SYNDROME: ICD-10-CM

## 2020-03-17 RX ORDER — TRAMADOL HYDROCHLORIDE 50 MG/1
50 TABLET ORAL 4 TIMES DAILY
Qty: 120 TABLET | Refills: 0 | Status: SHIPPED | OUTPATIENT
Start: 2020-03-17 | End: 2020-05-13 | Stop reason: SDUPTHER

## 2020-05-13 DIAGNOSIS — G89.4 CHRONIC PAIN SYNDROME: ICD-10-CM

## 2020-05-14 RX ORDER — TRAMADOL HYDROCHLORIDE 50 MG/1
50 TABLET ORAL 4 TIMES DAILY
Qty: 120 TABLET | Refills: 0 | Status: SHIPPED | OUTPATIENT
Start: 2020-05-14 | End: 2020-06-15

## 2020-06-01 ENCOUNTER — OFFICE VISIT (OUTPATIENT)
Dept: FAMILY MEDICINE CLINIC | Facility: CLINIC | Age: 60
End: 2020-06-01
Payer: COMMERCIAL

## 2020-06-01 VITALS
BODY MASS INDEX: 22.04 KG/M2 | TEMPERATURE: 98.2 F | SYSTOLIC BLOOD PRESSURE: 128 MMHG | DIASTOLIC BLOOD PRESSURE: 78 MMHG | RESPIRATION RATE: 16 BRPM | WEIGHT: 148.8 LBS | OXYGEN SATURATION: 99 % | HEART RATE: 65 BPM | HEIGHT: 69 IN

## 2020-06-01 DIAGNOSIS — E78.5 HYPERLIPIDEMIA, UNSPECIFIED HYPERLIPIDEMIA TYPE: ICD-10-CM

## 2020-06-01 DIAGNOSIS — B18.2 CHRONIC HEPATITIS C WITHOUT HEPATIC COMA (HCC): ICD-10-CM

## 2020-06-01 DIAGNOSIS — M54.2 CHRONIC NECK PAIN: Primary | ICD-10-CM

## 2020-06-01 DIAGNOSIS — N40.1 BENIGN PROSTATIC HYPERPLASIA WITH URINARY OBSTRUCTION: ICD-10-CM

## 2020-06-01 DIAGNOSIS — R74.8 ABNORMAL AST AND ALT: ICD-10-CM

## 2020-06-01 DIAGNOSIS — F33.0 MILD EPISODE OF RECURRENT MAJOR DEPRESSIVE DISORDER (HCC): ICD-10-CM

## 2020-06-01 DIAGNOSIS — G89.29 CHRONIC NECK PAIN: Primary | ICD-10-CM

## 2020-06-01 DIAGNOSIS — N13.8 BENIGN PROSTATIC HYPERPLASIA WITH URINARY OBSTRUCTION: ICD-10-CM

## 2020-06-01 PROBLEM — N41.0 ACUTE PROSTATITIS: Status: RESOLVED | Noted: 2019-10-14 | Resolved: 2020-06-01

## 2020-06-01 PROBLEM — Z00.00 MEDICARE ANNUAL WELLNESS VISIT, SUBSEQUENT: Status: RESOLVED | Noted: 2019-10-14 | Resolved: 2020-06-01

## 2020-06-01 PROCEDURE — 99214 OFFICE O/P EST MOD 30 MIN: CPT | Performed by: FAMILY MEDICINE

## 2020-06-01 PROCEDURE — 1036F TOBACCO NON-USER: CPT | Performed by: FAMILY MEDICINE

## 2020-06-01 PROCEDURE — 3008F BODY MASS INDEX DOCD: CPT | Performed by: FAMILY MEDICINE

## 2020-06-14 DIAGNOSIS — G89.4 CHRONIC PAIN SYNDROME: ICD-10-CM

## 2020-06-15 RX ORDER — TRAMADOL HYDROCHLORIDE 50 MG/1
50 TABLET ORAL 4 TIMES DAILY
Qty: 120 TABLET | Refills: 0 | Status: SHIPPED | OUTPATIENT
Start: 2020-06-15 | End: 2020-08-14 | Stop reason: SDUPTHER

## 2020-07-08 LAB
ALBUMIN SERPL-MCNC: 4.2 G/DL (ref 3.6–5.1)
ALBUMIN/GLOB SERPL: 1.8 (CALC) (ref 1–2.5)
ALP SERPL-CCNC: 75 U/L (ref 35–144)
ALT SERPL-CCNC: 8 U/L (ref 9–46)
AST SERPL-CCNC: 21 U/L (ref 10–35)
BILIRUB SERPL-MCNC: 0.5 MG/DL (ref 0.2–1.2)
BUN SERPL-MCNC: 25 MG/DL (ref 7–25)
BUN/CREAT SERPL: ABNORMAL (CALC) (ref 6–22)
CALCIUM SERPL-MCNC: 9.3 MG/DL (ref 8.6–10.3)
CHLORIDE SERPL-SCNC: 105 MMOL/L (ref 98–110)
CHOLEST SERPL-MCNC: 198 MG/DL
CHOLEST/HDLC SERPL: 3.7 (CALC)
CO2 SERPL-SCNC: 31 MMOL/L (ref 20–32)
CREAT SERPL-MCNC: 0.94 MG/DL (ref 0.7–1.25)
GLOBULIN SER CALC-MCNC: 2.3 G/DL (CALC) (ref 1.9–3.7)
GLUCOSE SERPL-MCNC: 95 MG/DL (ref 65–99)
HDLC SERPL-MCNC: 53 MG/DL
LDLC SERPL CALC-MCNC: 128 MG/DL (CALC)
NONHDLC SERPL-MCNC: 145 MG/DL (CALC)
POTASSIUM SERPL-SCNC: 5.1 MMOL/L (ref 3.5–5.3)
PROT SERPL-MCNC: 6.5 G/DL (ref 6.1–8.1)
PSA SERPL-MCNC: 1.8 NG/ML
SL AMB EGFR AFRICAN AMERICAN: 101 ML/MIN/1.73M2
SL AMB EGFR NON AFRICAN AMERICAN: 87 ML/MIN/1.73M2
SODIUM SERPL-SCNC: 140 MMOL/L (ref 135–146)
TRIGL SERPL-MCNC: 78 MG/DL

## 2020-08-10 DIAGNOSIS — M47.22 OSTEOARTHRITIS OF SPINE WITH RADICULOPATHY, CERVICAL REGION: ICD-10-CM

## 2020-08-10 RX ORDER — MELOXICAM 15 MG/1
15 TABLET ORAL DAILY
Qty: 30 TABLET | Refills: 5 | Status: SHIPPED | OUTPATIENT
Start: 2020-08-10 | End: 2021-01-20

## 2020-08-14 DIAGNOSIS — G89.4 CHRONIC PAIN SYNDROME: ICD-10-CM

## 2020-08-14 RX ORDER — TRAMADOL HYDROCHLORIDE 50 MG/1
50 TABLET ORAL 4 TIMES DAILY
Qty: 120 TABLET | Refills: 0 | Status: SHIPPED | OUTPATIENT
Start: 2020-08-14 | End: 2020-09-25

## 2020-08-19 ENCOUNTER — TELEPHONE (OUTPATIENT)
Dept: FAMILY MEDICINE CLINIC | Facility: CLINIC | Age: 60
End: 2020-08-19

## 2020-09-17 ENCOUNTER — TELEPHONE (OUTPATIENT)
Dept: FAMILY MEDICINE CLINIC | Facility: CLINIC | Age: 60
End: 2020-09-17

## 2020-09-23 DIAGNOSIS — G89.4 CHRONIC PAIN SYNDROME: ICD-10-CM

## 2020-09-25 RX ORDER — TRAMADOL HYDROCHLORIDE 50 MG/1
50 TABLET ORAL 4 TIMES DAILY
Qty: 120 TABLET | Refills: 0 | Status: SHIPPED | OUTPATIENT
Start: 2020-09-25 | End: 2020-12-16 | Stop reason: SDUPTHER

## 2020-09-30 ENCOUNTER — HOSPITAL ENCOUNTER (OUTPATIENT)
Dept: MRI IMAGING | Facility: HOSPITAL | Age: 60
Discharge: HOME | End: 2020-09-30
Payer: COMMERCIAL

## 2020-09-30 ENCOUNTER — OFFICE VISIT (OUTPATIENT)
Dept: FAMILY MEDICINE CLINIC | Facility: CLINIC | Age: 60
End: 2020-09-30
Payer: COMMERCIAL

## 2020-09-30 VITALS
OXYGEN SATURATION: 99 % | WEIGHT: 147.5 LBS | DIASTOLIC BLOOD PRESSURE: 82 MMHG | BODY MASS INDEX: 21.84 KG/M2 | SYSTOLIC BLOOD PRESSURE: 126 MMHG | HEART RATE: 85 BPM | TEMPERATURE: 96.8 F | HEIGHT: 69 IN

## 2020-09-30 DIAGNOSIS — G89.29 CHRONIC NECK PAIN: ICD-10-CM

## 2020-09-30 DIAGNOSIS — M54.2 NECK PAIN: ICD-10-CM
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
## 2020-09-30 DIAGNOSIS — M54.2 CHRONIC NECK PAIN: ICD-10-CM

## 2020-09-30 DIAGNOSIS — M54.42 ACUTE LEFT-SIDED LOW BACK PAIN WITH LEFT-SIDED SCIATICA: ICD-10-CM

## 2020-09-30 DIAGNOSIS — M54.12 CERVICAL NEURITIS: ICD-10-CM

## 2020-09-30 DIAGNOSIS — M54.16 LUMBAR NEURITIS: ICD-10-CM

## 2020-09-30 PROCEDURE — 72141 MRI NECK SPINE W/O DYE: CPT

## 2020-09-30 PROCEDURE — 99214 OFFICE O/P EST MOD 30 MIN: CPT | Performed by: FAMILY MEDICINE

## 2020-10-01 ENCOUNTER — TELEPHONE (OUTPATIENT)
Dept: FAMILY MEDICINE CLINIC | Facility: CLINIC | Age: 60
End: 2020-10-01

## 2020-10-01 RX ORDER — TAMSULOSIN HYDROCHLORIDE 0.4 MG/1
0.4 CAPSULE ORAL DAILY
COMMUNITY

## 2020-10-05 ENCOUNTER — TELEPHONE (OUTPATIENT)
Dept: FAMILY MEDICINE CLINIC | Facility: CLINIC | Age: 60
End: 2020-10-05

## 2020-10-12 ENCOUNTER — HOSPITAL ENCOUNTER (OUTPATIENT)
Dept: MRI IMAGING | Facility: HOSPITAL | Age: 60
Discharge: HOME | End: 2020-10-12
Payer: COMMERCIAL

## 2020-10-12 DIAGNOSIS — M54.16 LUMBAR NEURITIS: ICD-10-CM

## 2020-10-12 PROCEDURE — G1004 CDSM NDSC: HCPCS

## 2020-10-12 PROCEDURE — 72148 MRI LUMBAR SPINE W/O DYE: CPT

## 2020-10-16 ENCOUNTER — TELEPHONE (OUTPATIENT)
Dept: FAMILY MEDICINE CLINIC | Facility: CLINIC | Age: 60
End: 2020-10-16

## 2020-10-22 ENCOUNTER — OFFICE VISIT (OUTPATIENT)
Dept: FAMILY MEDICINE CLINIC | Facility: CLINIC | Age: 60
End: 2020-10-22
Payer: COMMERCIAL

## 2020-10-22 VITALS
TEMPERATURE: 97.4 F | WEIGHT: 150 LBS | OXYGEN SATURATION: 99 % | SYSTOLIC BLOOD PRESSURE: 130 MMHG | DIASTOLIC BLOOD PRESSURE: 80 MMHG | HEIGHT: 69 IN | BODY MASS INDEX: 22.22 KG/M2 | HEART RATE: 67 BPM

## 2020-10-22 DIAGNOSIS — R97.20 ELEVATED PSA: ICD-10-CM

## 2020-10-22 DIAGNOSIS — G89.4 CHRONIC PAIN SYNDROME: ICD-10-CM

## 2020-10-22 DIAGNOSIS — Z00.00 MEDICARE ANNUAL WELLNESS VISIT, SUBSEQUENT: Primary | ICD-10-CM

## 2020-10-22 DIAGNOSIS — R53.82 CHRONIC FATIGUE: ICD-10-CM

## 2020-10-22 PROCEDURE — 3725F SCREEN DEPRESSION PERFORMED: CPT | Performed by: FAMILY MEDICINE

## 2020-10-22 PROCEDURE — 1036F TOBACCO NON-USER: CPT | Performed by: FAMILY MEDICINE

## 2020-10-22 PROCEDURE — G0439 PPPS, SUBSEQ VISIT: HCPCS | Performed by: FAMILY MEDICINE

## 2020-11-03 ENCOUNTER — EVALUATION (OUTPATIENT)
Dept: PHYSICAL THERAPY | Facility: CLINIC | Age: 60
End: 2020-11-03
Payer: COMMERCIAL

## 2020-11-03 ENCOUNTER — TRANSCRIBE ORDERS (OUTPATIENT)
Dept: PHYSICAL THERAPY | Facility: CLINIC | Age: 60
End: 2020-11-03

## 2020-11-03 DIAGNOSIS — M54.12 CERVICAL RADICULOPATHY: Primary | ICD-10-CM

## 2020-11-03 DIAGNOSIS — M79.652 LEFT THIGH PAIN: ICD-10-CM

## 2020-11-03 DIAGNOSIS — M54.16 LUMBAR RADICULOPATHY: ICD-10-CM

## 2020-11-03 DIAGNOSIS — R53.1 GENERALIZED WEAKNESS: ICD-10-CM

## 2020-11-03 PROCEDURE — 97110 THERAPEUTIC EXERCISES: CPT | Performed by: PHYSICAL THERAPIST

## 2020-11-03 PROCEDURE — 97162 PT EVAL MOD COMPLEX 30 MIN: CPT | Performed by: PHYSICAL THERAPIST

## 2020-11-05 ENCOUNTER — OFFICE VISIT (OUTPATIENT)
Dept: PHYSICAL THERAPY | Facility: CLINIC | Age: 60
End: 2020-11-05
Payer: COMMERCIAL

## 2020-11-05 DIAGNOSIS — M54.16 LUMBAR RADICULOPATHY: ICD-10-CM

## 2020-11-05 DIAGNOSIS — R53.1 GENERALIZED WEAKNESS: ICD-10-CM

## 2020-11-05 DIAGNOSIS — M79.652 LEFT THIGH PAIN: ICD-10-CM

## 2020-11-05 DIAGNOSIS — M54.12 CERVICAL RADICULOPATHY: Primary | ICD-10-CM

## 2020-11-05 PROCEDURE — 97110 THERAPEUTIC EXERCISES: CPT | Performed by: PHYSICAL THERAPIST

## 2020-11-10 ENCOUNTER — OFFICE VISIT (OUTPATIENT)
Dept: PHYSICAL THERAPY | Facility: CLINIC | Age: 60
End: 2020-11-10
Payer: COMMERCIAL

## 2020-11-10 DIAGNOSIS — M54.12 CERVICAL RADICULOPATHY: Primary | ICD-10-CM

## 2020-11-10 DIAGNOSIS — M54.16 LUMBAR RADICULOPATHY: ICD-10-CM

## 2020-11-10 DIAGNOSIS — M79.652 LEFT THIGH PAIN: ICD-10-CM

## 2020-11-10 DIAGNOSIS — R53.1 GENERALIZED WEAKNESS: ICD-10-CM

## 2020-11-10 PROCEDURE — 97112 NEUROMUSCULAR REEDUCATION: CPT | Performed by: PHYSICAL THERAPIST

## 2020-11-10 PROCEDURE — 97140 MANUAL THERAPY 1/> REGIONS: CPT | Performed by: PHYSICAL THERAPIST

## 2020-11-10 PROCEDURE — 97110 THERAPEUTIC EXERCISES: CPT | Performed by: PHYSICAL THERAPIST

## 2020-11-12 ENCOUNTER — APPOINTMENT (OUTPATIENT)
Dept: PHYSICAL THERAPY | Facility: CLINIC | Age: 60
End: 2020-11-12
Payer: COMMERCIAL

## 2020-11-17 ENCOUNTER — OFFICE VISIT (OUTPATIENT)
Dept: PHYSICAL THERAPY | Facility: CLINIC | Age: 60
End: 2020-11-17
Payer: COMMERCIAL

## 2020-11-17 DIAGNOSIS — M54.12 CERVICAL RADICULOPATHY: Primary | ICD-10-CM

## 2020-11-17 DIAGNOSIS — G89.29 CHRONIC NECK PAIN: ICD-10-CM

## 2020-11-17 DIAGNOSIS — M79.652 LEFT THIGH PAIN: ICD-10-CM

## 2020-11-17 DIAGNOSIS — R53.1 GENERALIZED WEAKNESS: ICD-10-CM

## 2020-11-17 DIAGNOSIS — M54.2 CHRONIC NECK PAIN: ICD-10-CM

## 2020-11-17 DIAGNOSIS — M54.16 LUMBAR RADICULOPATHY: ICD-10-CM

## 2020-11-17 PROCEDURE — 97140 MANUAL THERAPY 1/> REGIONS: CPT | Performed by: PHYSICAL THERAPIST

## 2020-11-17 PROCEDURE — 97110 THERAPEUTIC EXERCISES: CPT | Performed by: PHYSICAL THERAPIST

## 2020-11-17 PROCEDURE — 97112 NEUROMUSCULAR REEDUCATION: CPT | Performed by: PHYSICAL THERAPIST

## 2020-11-19 ENCOUNTER — OFFICE VISIT (OUTPATIENT)
Dept: PHYSICAL THERAPY | Facility: CLINIC | Age: 60
End: 2020-11-19
Payer: COMMERCIAL

## 2020-11-19 DIAGNOSIS — M54.16 LUMBAR RADICULOPATHY: ICD-10-CM

## 2020-11-19 DIAGNOSIS — R53.1 GENERALIZED WEAKNESS: ICD-10-CM

## 2020-11-19 DIAGNOSIS — M54.12 CERVICAL RADICULOPATHY: ICD-10-CM

## 2020-11-19 DIAGNOSIS — M54.2 CHRONIC NECK PAIN: Primary | ICD-10-CM

## 2020-11-19 DIAGNOSIS — G89.29 CHRONIC NECK PAIN: Primary | ICD-10-CM

## 2020-11-19 DIAGNOSIS — M79.652 LEFT THIGH PAIN: ICD-10-CM

## 2020-11-19 PROCEDURE — 97110 THERAPEUTIC EXERCISES: CPT | Performed by: PHYSICAL THERAPIST

## 2020-11-19 PROCEDURE — 97140 MANUAL THERAPY 1/> REGIONS: CPT | Performed by: PHYSICAL THERAPIST

## 2020-11-19 PROCEDURE — 97112 NEUROMUSCULAR REEDUCATION: CPT | Performed by: PHYSICAL THERAPIST

## 2020-11-23 ENCOUNTER — OFFICE VISIT (OUTPATIENT)
Dept: PHYSICAL THERAPY | Facility: CLINIC | Age: 60
End: 2020-11-23
Payer: COMMERCIAL

## 2020-11-23 DIAGNOSIS — M54.2 CHRONIC NECK PAIN: Primary | ICD-10-CM

## 2020-11-23 DIAGNOSIS — G89.29 CHRONIC NECK PAIN: Primary | ICD-10-CM

## 2020-11-23 DIAGNOSIS — M79.652 LEFT THIGH PAIN: ICD-10-CM

## 2020-11-23 DIAGNOSIS — M54.12 CERVICAL RADICULOPATHY: ICD-10-CM

## 2020-11-23 DIAGNOSIS — R53.1 GENERALIZED WEAKNESS: ICD-10-CM

## 2020-11-23 DIAGNOSIS — M54.16 LUMBAR RADICULOPATHY: ICD-10-CM

## 2020-11-23 PROCEDURE — 97110 THERAPEUTIC EXERCISES: CPT

## 2020-11-23 PROCEDURE — 97140 MANUAL THERAPY 1/> REGIONS: CPT

## 2020-11-23 PROCEDURE — 97112 NEUROMUSCULAR REEDUCATION: CPT

## 2020-11-25 ENCOUNTER — OFFICE VISIT (OUTPATIENT)
Dept: PHYSICAL THERAPY | Facility: CLINIC | Age: 60
End: 2020-11-25
Payer: COMMERCIAL

## 2020-11-25 DIAGNOSIS — R53.1 GENERALIZED WEAKNESS: Primary | ICD-10-CM

## 2020-11-25 DIAGNOSIS — M54.2 CHRONIC NECK PAIN: ICD-10-CM

## 2020-11-25 DIAGNOSIS — M54.16 LUMBAR RADICULOPATHY: ICD-10-CM

## 2020-11-25 DIAGNOSIS — M79.652 LEFT THIGH PAIN: ICD-10-CM

## 2020-11-25 DIAGNOSIS — G89.29 CHRONIC NECK PAIN: ICD-10-CM

## 2020-11-25 DIAGNOSIS — M54.12 CERVICAL RADICULOPATHY: ICD-10-CM

## 2020-11-25 PROCEDURE — 97110 THERAPEUTIC EXERCISES: CPT | Performed by: PHYSICAL THERAPIST

## 2020-11-25 PROCEDURE — 97140 MANUAL THERAPY 1/> REGIONS: CPT | Performed by: PHYSICAL THERAPIST

## 2020-12-01 ENCOUNTER — OFFICE VISIT (OUTPATIENT)
Dept: PHYSICAL THERAPY | Facility: CLINIC | Age: 60
End: 2020-12-01
Payer: COMMERCIAL

## 2020-12-01 DIAGNOSIS — M79.652 LEFT THIGH PAIN: Primary | ICD-10-CM

## 2020-12-01 DIAGNOSIS — M54.2 CHRONIC NECK PAIN: ICD-10-CM

## 2020-12-01 DIAGNOSIS — G89.29 CHRONIC NECK PAIN: ICD-10-CM

## 2020-12-01 DIAGNOSIS — M54.12 CERVICAL RADICULOPATHY: ICD-10-CM

## 2020-12-01 DIAGNOSIS — R53.1 GENERALIZED WEAKNESS: ICD-10-CM

## 2020-12-01 PROCEDURE — 97110 THERAPEUTIC EXERCISES: CPT | Performed by: PHYSICAL THERAPIST

## 2020-12-01 PROCEDURE — 97140 MANUAL THERAPY 1/> REGIONS: CPT | Performed by: PHYSICAL THERAPIST

## 2020-12-01 PROCEDURE — 97112 NEUROMUSCULAR REEDUCATION: CPT | Performed by: PHYSICAL THERAPIST

## 2020-12-03 ENCOUNTER — OFFICE VISIT (OUTPATIENT)
Dept: PHYSICAL THERAPY | Facility: CLINIC | Age: 60
End: 2020-12-03
Payer: COMMERCIAL

## 2020-12-03 DIAGNOSIS — M54.12 CERVICAL RADICULOPATHY: ICD-10-CM

## 2020-12-03 DIAGNOSIS — R53.1 GENERALIZED WEAKNESS: ICD-10-CM

## 2020-12-03 DIAGNOSIS — M54.16 LUMBAR RADICULOPATHY: Primary | ICD-10-CM

## 2020-12-03 DIAGNOSIS — M79.652 LEFT THIGH PAIN: ICD-10-CM

## 2020-12-03 DIAGNOSIS — G89.29 CHRONIC NECK PAIN: ICD-10-CM

## 2020-12-03 DIAGNOSIS — M54.2 CHRONIC NECK PAIN: ICD-10-CM

## 2020-12-03 PROCEDURE — 97110 THERAPEUTIC EXERCISES: CPT | Performed by: PHYSICAL THERAPIST

## 2020-12-03 PROCEDURE — 97140 MANUAL THERAPY 1/> REGIONS: CPT | Performed by: PHYSICAL THERAPIST

## 2020-12-08 ENCOUNTER — OFFICE VISIT (OUTPATIENT)
Dept: PHYSICAL THERAPY | Facility: CLINIC | Age: 60
End: 2020-12-08
Payer: COMMERCIAL

## 2020-12-08 DIAGNOSIS — R53.1 GENERALIZED WEAKNESS: ICD-10-CM

## 2020-12-08 DIAGNOSIS — G89.29 CHRONIC NECK PAIN: ICD-10-CM

## 2020-12-08 DIAGNOSIS — M79.652 LEFT THIGH PAIN: ICD-10-CM

## 2020-12-08 DIAGNOSIS — M54.16 LUMBAR RADICULOPATHY: ICD-10-CM

## 2020-12-08 DIAGNOSIS — M54.2 CHRONIC NECK PAIN: ICD-10-CM

## 2020-12-08 DIAGNOSIS — M54.12 CERVICAL RADICULOPATHY: Primary | ICD-10-CM

## 2020-12-08 PROCEDURE — 97112 NEUROMUSCULAR REEDUCATION: CPT | Performed by: PHYSICAL THERAPIST

## 2020-12-08 PROCEDURE — 97140 MANUAL THERAPY 1/> REGIONS: CPT | Performed by: PHYSICAL THERAPIST

## 2020-12-08 PROCEDURE — 97110 THERAPEUTIC EXERCISES: CPT | Performed by: PHYSICAL THERAPIST

## 2020-12-10 ENCOUNTER — OFFICE VISIT (OUTPATIENT)
Dept: PHYSICAL THERAPY | Facility: CLINIC | Age: 60
End: 2020-12-10
Payer: COMMERCIAL

## 2020-12-10 DIAGNOSIS — M54.2 CHRONIC NECK PAIN: Primary | ICD-10-CM

## 2020-12-10 DIAGNOSIS — M79.652 LEFT THIGH PAIN: ICD-10-CM

## 2020-12-10 DIAGNOSIS — M54.16 LUMBAR RADICULOPATHY: ICD-10-CM

## 2020-12-10 DIAGNOSIS — M54.12 CERVICAL RADICULOPATHY: ICD-10-CM

## 2020-12-10 DIAGNOSIS — G89.29 CHRONIC NECK PAIN: Primary | ICD-10-CM

## 2020-12-10 DIAGNOSIS — R53.1 GENERALIZED WEAKNESS: ICD-10-CM

## 2020-12-10 PROCEDURE — 97140 MANUAL THERAPY 1/> REGIONS: CPT | Performed by: PHYSICAL THERAPIST

## 2020-12-10 PROCEDURE — 97112 NEUROMUSCULAR REEDUCATION: CPT | Performed by: PHYSICAL THERAPIST

## 2020-12-10 PROCEDURE — 97110 THERAPEUTIC EXERCISES: CPT | Performed by: PHYSICAL THERAPIST

## 2020-12-15 ENCOUNTER — OFFICE VISIT (OUTPATIENT)
Dept: PHYSICAL THERAPY | Facility: CLINIC | Age: 60
End: 2020-12-15
Payer: COMMERCIAL

## 2020-12-15 DIAGNOSIS — M54.2 CHRONIC NECK PAIN: ICD-10-CM

## 2020-12-15 DIAGNOSIS — M79.652 LEFT THIGH PAIN: ICD-10-CM

## 2020-12-15 DIAGNOSIS — G89.29 CHRONIC NECK PAIN: ICD-10-CM

## 2020-12-15 DIAGNOSIS — M54.16 LUMBAR RADICULOPATHY: ICD-10-CM

## 2020-12-15 DIAGNOSIS — M54.12 CERVICAL RADICULOPATHY: ICD-10-CM

## 2020-12-15 DIAGNOSIS — R53.1 GENERALIZED WEAKNESS: Primary | ICD-10-CM

## 2020-12-15 PROCEDURE — 97112 NEUROMUSCULAR REEDUCATION: CPT | Performed by: PHYSICAL THERAPIST

## 2020-12-15 PROCEDURE — 97140 MANUAL THERAPY 1/> REGIONS: CPT | Performed by: PHYSICAL THERAPIST

## 2020-12-15 PROCEDURE — 97110 THERAPEUTIC EXERCISES: CPT | Performed by: PHYSICAL THERAPIST

## 2020-12-16 DIAGNOSIS — G89.4 CHRONIC PAIN SYNDROME: ICD-10-CM

## 2020-12-16 RX ORDER — TRAMADOL HYDROCHLORIDE 50 MG/1
50 TABLET ORAL 4 TIMES DAILY
Qty: 120 TABLET | Refills: 0 | Status: SHIPPED | OUTPATIENT
Start: 2020-12-16 | End: 2021-01-20

## 2020-12-17 ENCOUNTER — APPOINTMENT (OUTPATIENT)
Dept: PHYSICAL THERAPY | Facility: CLINIC | Age: 60
End: 2020-12-17
Payer: COMMERCIAL

## 2020-12-22 ENCOUNTER — OFFICE VISIT (OUTPATIENT)
Dept: PHYSICAL THERAPY | Facility: CLINIC | Age: 60
End: 2020-12-22
Payer: COMMERCIAL

## 2020-12-22 DIAGNOSIS — R53.1 GENERALIZED WEAKNESS: Primary | ICD-10-CM

## 2020-12-22 DIAGNOSIS — M54.16 LUMBAR RADICULOPATHY: ICD-10-CM

## 2020-12-22 DIAGNOSIS — M54.12 CERVICAL RADICULOPATHY: ICD-10-CM

## 2020-12-22 DIAGNOSIS — G89.29 CHRONIC NECK PAIN: ICD-10-CM

## 2020-12-22 DIAGNOSIS — M54.2 CHRONIC NECK PAIN: ICD-10-CM

## 2020-12-22 DIAGNOSIS — M79.652 LEFT THIGH PAIN: ICD-10-CM

## 2020-12-22 PROCEDURE — 97110 THERAPEUTIC EXERCISES: CPT

## 2020-12-22 PROCEDURE — 97140 MANUAL THERAPY 1/> REGIONS: CPT

## 2020-12-22 PROCEDURE — 97112 NEUROMUSCULAR REEDUCATION: CPT

## 2020-12-24 ENCOUNTER — APPOINTMENT (OUTPATIENT)
Dept: PHYSICAL THERAPY | Facility: CLINIC | Age: 60
End: 2020-12-24
Payer: COMMERCIAL

## 2020-12-29 ENCOUNTER — OFFICE VISIT (OUTPATIENT)
Dept: PHYSICAL THERAPY | Facility: CLINIC | Age: 60
End: 2020-12-29
Payer: COMMERCIAL

## 2020-12-29 DIAGNOSIS — M54.2 CHRONIC NECK PAIN: ICD-10-CM

## 2020-12-29 DIAGNOSIS — M54.16 LUMBAR RADICULOPATHY: ICD-10-CM

## 2020-12-29 DIAGNOSIS — M54.12 CERVICAL RADICULOPATHY: ICD-10-CM

## 2020-12-29 DIAGNOSIS — R53.1 GENERALIZED WEAKNESS: Primary | ICD-10-CM

## 2020-12-29 DIAGNOSIS — M79.652 LEFT THIGH PAIN: ICD-10-CM

## 2020-12-29 DIAGNOSIS — G89.29 CHRONIC NECK PAIN: ICD-10-CM

## 2020-12-29 PROCEDURE — 97110 THERAPEUTIC EXERCISES: CPT | Performed by: PHYSICAL THERAPIST

## 2020-12-29 PROCEDURE — 97112 NEUROMUSCULAR REEDUCATION: CPT | Performed by: PHYSICAL THERAPIST

## 2020-12-29 PROCEDURE — 97140 MANUAL THERAPY 1/> REGIONS: CPT | Performed by: PHYSICAL THERAPIST

## 2020-12-31 ENCOUNTER — APPOINTMENT (OUTPATIENT)
Dept: PHYSICAL THERAPY | Facility: CLINIC | Age: 60
End: 2020-12-31
Payer: COMMERCIAL

## 2021-01-05 ENCOUNTER — OFFICE VISIT (OUTPATIENT)
Dept: PHYSICAL THERAPY | Facility: CLINIC | Age: 61
End: 2021-01-05
Payer: COMMERCIAL

## 2021-01-05 DIAGNOSIS — R53.1 GENERALIZED WEAKNESS: Primary | ICD-10-CM

## 2021-01-05 DIAGNOSIS — M54.16 LUMBAR RADICULOPATHY: ICD-10-CM

## 2021-01-05 DIAGNOSIS — M54.12 CERVICAL RADICULOPATHY: ICD-10-CM

## 2021-01-05 DIAGNOSIS — M54.2 CHRONIC NECK PAIN: ICD-10-CM

## 2021-01-05 DIAGNOSIS — G89.29 CHRONIC NECK PAIN: ICD-10-CM

## 2021-01-05 DIAGNOSIS — M79.652 LEFT THIGH PAIN: ICD-10-CM

## 2021-01-05 PROCEDURE — 97140 MANUAL THERAPY 1/> REGIONS: CPT

## 2021-01-05 PROCEDURE — 97014 ELECTRIC STIMULATION THERAPY: CPT

## 2021-01-05 PROCEDURE — 97110 THERAPEUTIC EXERCISES: CPT

## 2021-01-05 NOTE — PROGRESS NOTES
Daily Note     Today's date: 2021  Patient name: Rosemarie Robert  : 1960  MRN: [id number]  Referring provider: Tracy Terrell MD  Dx:   Encounter Diagnosis     ICD-10-CM    1  Generalized weakness  R53 1    2  Chronic neck pain  M54 2     G89 29    3  Cervical radiculopathy  M54 12    4  Lumbar radiculopathy  M54 16    5  Left thigh pain  M79 652                   Subjective: Patient states he is feeling okay today  Objective: See treatment diary below      Assessment: Tolerated treatment well  Initiated POC in recumbent position with MHP while performing cervical retraction  Continued with POC as able  He responded well to manuals with notably less pain following  Concluded with MHP and e-stim  Patient demonstrated fatigue post treatment and would benefit from continued PT      Plan: Continue per plan of care        HEP: supine chin tucks, CODY, seated HS stretch    Specialty Daily Treatment Diary       Manual 12/10 12/15 12/22 12/29 1/   STM/SOR to cervical SMF SMF RA BR RA   TPR B UT, LS SMF SMF RA BR RA   UT; LS stretch SMF SMF RA BR RA           TPR to L piriformis        L Bryan stretch        PA markus lumbar  SMF              Exercise Diary         UBE        UT; LS stretch        Pec stretch (unilaterally)        Chin tucks 2 sec; 30 (supine c MH) 5 sec; 30 (supine c MH) 5 sec x 30  Supine MH  1x10 5 sec x 30   Hooklying Hugs to T's 5 sec x 30 5 sec x 30 5 sec x 30  5 sec x 30            TB Scap Retraction; TB S' Ext        B ER; B Horiz ABD        Scaption                Gripper        Diggiflex                Standing Lumbar Extension 2 sec x 10 2 sec x 10 2 sec x 10  2sec x 10 2 se           Seated HS stretch  20 sec x 3 ea 20 sec x 3  20sec x 3 B/L At home   Seated Thoracic Extension c MB        Seated 3 way Forward Trunk Flexion  10 sec x 5 ea 10 sec x 5 ea 10 sec x 5 ea 10 sec x 10 ea 10 sec x 10                   SNAGs c towel Extension        Upper Trap stretch; LS stretch CODY -> PPU 15 (CODY) 15 (CODY) 2x10 CODY 2x10 CODY 2x10 CODY   Glute sets c MH lumbar extension f/b prone quad stretch 5 sec 30; 20 sec x 3 ea (PQS) 5 sec x 30; 20 sec x 3 ea (PQS) 5 sec x 30; 20 sec x 3 ea (PQS) 5 sec x 30; 20 x 3 ea (PQS) 5 sec x 30; 20 x 3 ea (PQS)   Prone H' Ext                TAC; TAC c BKFO        TAC c Marches        TAC c SLR        TAC c OH Reaching                SKTC; DKTC        PB LTR; PB DKTC 10 sec x 20 ea 10 sec x 20 ea 10 sec x 20 ea 10 sec x 20 ea 10 sec x 20   2 way Piriformis stretch 20 sec x 3 ea 20 sec x 3 ea 20 sec x 3  20 sec x 3 20 sec x 3    Cross Rot stretch c OP        PPT 2 sec; 2x10 2 sec; 20 2 sec x 20  2 sec x 20 2 sec x 20                    Prone T        Prone I         Sidelying Thoracic Rotation                                Modalities        MH 10 mins to cs and l/s in hooklying pre/post c/s MH 10 mins to cs and l/s hooklying pre; MH c/s 10 mins to cs and l/s hooklying pre; MH c/s 10 mins to cs and l/s hooklying pre; MH c/s 10 mins to cs and l/s hooklying pre; MH c/s   CP        Estim 10 mins post c MH 10 mins post c MH NV 10 mins post c MH 10 min post c MH      Skin checks performed pre and post application: intact

## 2021-01-07 ENCOUNTER — OFFICE VISIT (OUTPATIENT)
Dept: PHYSICAL THERAPY | Facility: CLINIC | Age: 61
End: 2021-01-07
Payer: COMMERCIAL

## 2021-01-07 DIAGNOSIS — G89.29 CHRONIC NECK PAIN: ICD-10-CM

## 2021-01-07 DIAGNOSIS — M54.12 CERVICAL RADICULOPATHY: ICD-10-CM

## 2021-01-07 DIAGNOSIS — M54.16 LUMBAR RADICULOPATHY: ICD-10-CM

## 2021-01-07 DIAGNOSIS — R53.1 GENERALIZED WEAKNESS: Primary | ICD-10-CM

## 2021-01-07 DIAGNOSIS — M54.2 CHRONIC NECK PAIN: ICD-10-CM

## 2021-01-07 DIAGNOSIS — M79.652 LEFT THIGH PAIN: ICD-10-CM

## 2021-01-07 PROCEDURE — 97112 NEUROMUSCULAR REEDUCATION: CPT

## 2021-01-07 PROCEDURE — 97140 MANUAL THERAPY 1/> REGIONS: CPT

## 2021-01-07 PROCEDURE — 97014 ELECTRIC STIMULATION THERAPY: CPT

## 2021-01-07 PROCEDURE — 97110 THERAPEUTIC EXERCISES: CPT

## 2021-01-07 NOTE — PROGRESS NOTES
Daily Note     Today's date: 2021  Patient name: Varghese Francisco  : 1960  MRN: [id number]  Referring provider: Mj Allison MD  Dx:   Encounter Diagnosis     ICD-10-CM    1  Generalized weakness  R53 1    2  Chronic neck pain  M54 2     G89 29    3  Cervical radiculopathy  M54 12    4  Lumbar radiculopathy  M54 16    5  Left thigh pain  M79 652                   Subjective: Patient reports he continues to feel improvements and believes physical therapy helps him  Objective: See treatment diary below      Assessment: Tolerated treatment well  Initiated POC with MH while performing supine there ex  VCs provided on proper sequencing with exercises  He denies having increased pain throughout session  He continues to demonstrate decreased cervical PROM in all planes, but does note improvements  He deferred standing lumbar extensions due to increase in pain last visit  Consider adding in new exercises next visit  Patient demonstrated fatigue post treatment and would benefit from continued PT      Plan: Continue per plan of care        HEP: supine chin tucks, CODY, seated HS stretch    Specialty Daily Treatment Diary       Manual 1/7 12/15 12/22 12/29 1/4   STM/SOR to cervical RA SMF RA BR RA   TPR B UT, LS RA SMF RA BR RA   UT; LS stretch RA SMF RA BR RA           TPR to L piriformis        L Bryan stretch        PA glides lumbar  SMF              Exercise Diary         UBE        UT; LS stretch        Pec stretch (unilaterally)        Chin tucks 5 sec x 30  5 sec; 30 (supine c MH) 5 sec x 30  Supine MH  1x10 5 sec x 30   Hooklying Hugs to T's 5 sec x 30  5 sec x 30 5 sec x 30  5 sec x 30            TB Scap Retraction; TB S' Ext        B ER; B Horiz ABD        Scaption                Gripper        Diggiflex                Standing Lumbar Extension deferred 2 sec x 10 2 sec x 10  2sec x 10 2 se           Seated HS stretch  20 sec x 3 ea 20 sec x 3  20sec x 3 B/L At home   Seated Thoracic Extension c MB Seated 3 way Forward Trunk Flexion  10 sec x 10 10 sec x 5 ea 10 sec x 5 ea 10 sec x 10 ea 10 sec x 10                   SNAGs c towel Extension        Upper Trap stretch; LS stretch                                CODY -> PPU 2x10 CODY 15 (CODY) 2x10 CODY 2x10 CODY 2x10 CODY   Glute sets c MH lumbar extension f/b prone quad stretch 5 sec x 30; 20 x 3 ea (PQS) 5 sec x 30; 20 sec x 3 ea (PQS) 5 sec x 30; 20 sec x 3 ea (PQS) 5 sec x 30; 20 x 3 ea (PQS) 5 sec x 30; 20 x 3 ea (PQS)   Prone H' Ext                TAC; TAC c BKFO        TAC c Marches        TAC c SLR        TAC c OH Reaching                SKTC; DKTC        PB LTR; PB DKTC 10 sec x 20 ea  10 sec x 20 ea 10 sec x 20 ea 10 sec x 20 ea 10 sec x 20   2 way Piriformis stretch At home  20 sec x 3 ea 20 sec x 3  20 sec x 3 20 sec x 3    Cross Rot stretch c OP        PPT At hhome  2 sec; 20 2 sec x 20  2 sec x 20 2 sec x 20                    Prone T        Prone I         Sidelying Thoracic Rotation                                Modalities        MH 10 mins to cs and l/s hooklying pre; MH c/s 10 mins to cs and l/s hooklying pre; MH c/s 10 mins to cs and l/s hooklying pre; MH c/s 10 mins to cs and l/s hooklying pre; MH c/s 10 mins to cs and l/s hooklying pre; MH c/s   CP        Estim 10 min post MH 10 mins post c MH NV 10 mins post c MH 10 min post c       Skin checks performed pre and post application: intact

## 2021-01-12 ENCOUNTER — OFFICE VISIT (OUTPATIENT)
Dept: PHYSICAL THERAPY | Facility: CLINIC | Age: 61
End: 2021-01-12
Payer: COMMERCIAL

## 2021-01-12 DIAGNOSIS — M79.652 LEFT THIGH PAIN: Primary | ICD-10-CM

## 2021-01-12 DIAGNOSIS — R53.1 GENERALIZED WEAKNESS: ICD-10-CM

## 2021-01-12 DIAGNOSIS — M54.2 CHRONIC NECK PAIN: ICD-10-CM

## 2021-01-12 DIAGNOSIS — M54.12 CERVICAL RADICULOPATHY: ICD-10-CM

## 2021-01-12 DIAGNOSIS — G89.29 CHRONIC NECK PAIN: ICD-10-CM

## 2021-01-12 DIAGNOSIS — M54.16 LUMBAR RADICULOPATHY: ICD-10-CM

## 2021-01-12 PROCEDURE — 97140 MANUAL THERAPY 1/> REGIONS: CPT | Performed by: PHYSICAL THERAPIST

## 2021-01-12 PROCEDURE — 97110 THERAPEUTIC EXERCISES: CPT | Performed by: PHYSICAL THERAPIST

## 2021-01-12 PROCEDURE — 97112 NEUROMUSCULAR REEDUCATION: CPT | Performed by: PHYSICAL THERAPIST

## 2021-01-12 NOTE — PROGRESS NOTES
Daily Note     Today's date: 2021  Patient name: Jad Bess  : 1960  MRN: [id number]  Referring provider: Lupe Duong MD  Dx:   Encounter Diagnosis     ICD-10-CM    1  Left thigh pain  M79 652    2  Generalized weakness  R53 1    3  Chronic neck pain  M54 2     G89 29    4  Cervical radiculopathy  M54 12    5  Lumbar radiculopathy  M54 16                   Subjective: Pt reports that he feels pretty good today  Objective: See treatment diary below      Assessment: Tolerated treatment well; initiated POC with MH to cervical and low back while performing supine there ex  VCs provided on proper sequencing with exercises; added prone hip extension and wall sags  He denies having increased pain with exercises  He continues to have decreased CROM  Patient demonstrated fatigue post treatment and would benefit from continued PT      Plan: Continue per plan of care        HEP: supine chin tucks, CODY, seated HS stretch    Specialty Daily Treatment Diary       Manual    1   STM/SOR to cervical RA SMF   RA   TPR B UT, LS RA SMF   RA   UT; LS stretch RA SMF   RA           TPR to L piriformis        L Bryan stretch        PA markus lumbar                Exercise Diary         UBE        UT; LS stretch        Pec stretch (unilaterally)        Chin tucks 5 sec x 30  5 sec x 30   5 sec x 30   Hooklying Hugs to T's 5 sec x 30  5 sec x 30   5 sec x 30            TB Scap Retraction; TB S' Ext        B ER; B Horiz ABD        Scaption                Gripper        Diggiflex                Standing Lumbar Extension deferred Wall sags: 5 sec x 10   2 se           Seated HS stretch     At home   Seated Thoracic Extension c MB        Seated 3 way Forward Trunk Flexion  10 sec x 10 10 sec x 10   10 sec x 10                   SNAGs c towel Extension        Upper Trap stretch; LS stretch                                CODY -> PPU 2x10 CODY 2x10 CODY   2x10 CODY   Glute sets c MH lumbar extension f/b prone quad stretch 5 sec x 30; 20 x 3 ea (PQS) DC GS: 20 sec x 5    5 sec x 30; 20 x 3 ea (PQS)   Prone H' Ext  0# 10 ea              TAC; TAC c BKFO  10 sec x 20 (TAC only)      TAC c Marches        TAC c SLR        TAC c OH Reaching                SKTC; DKTC        PB LTR; PB DKTC 10 sec x 20 ea  10 sec x 20 ea   10 sec x 20   2 way Piriformis stretch At home  20 sec x 3 ea   20 sec x 3    Cross Rot stretch c OP        PPT At ome  NV   2 sec x 20                    Prone T        Prone I         Sidelying Thoracic Rotation                                Modalities        MH 10 mins to cs and l/s hooklying pre;  c/s 10 mins to c/s and l/s hooklying pre/MH c/s post   10 mins to cs and l/s hooklying pre;  c/s   CP        Estim 10 min post MH 10 min post   10 min post c MH      Skin checks performed pre and post application: intact

## 2021-01-14 ENCOUNTER — APPOINTMENT (OUTPATIENT)
Dept: PHYSICAL THERAPY | Facility: CLINIC | Age: 61
End: 2021-01-14
Payer: COMMERCIAL

## 2021-01-15 ENCOUNTER — OFFICE VISIT (OUTPATIENT)
Dept: PHYSICAL THERAPY | Facility: CLINIC | Age: 61
End: 2021-01-15
Payer: COMMERCIAL

## 2021-01-15 DIAGNOSIS — M54.2 CHRONIC NECK PAIN: ICD-10-CM

## 2021-01-15 DIAGNOSIS — G89.29 CHRONIC NECK PAIN: ICD-10-CM

## 2021-01-15 DIAGNOSIS — M54.12 CERVICAL RADICULOPATHY: ICD-10-CM

## 2021-01-15 DIAGNOSIS — M54.16 LUMBAR RADICULOPATHY: ICD-10-CM

## 2021-01-15 DIAGNOSIS — M79.652 LEFT THIGH PAIN: Primary | ICD-10-CM

## 2021-01-15 DIAGNOSIS — R53.1 GENERALIZED WEAKNESS: ICD-10-CM

## 2021-01-15 PROCEDURE — 97014 ELECTRIC STIMULATION THERAPY: CPT

## 2021-01-15 PROCEDURE — 97140 MANUAL THERAPY 1/> REGIONS: CPT

## 2021-01-15 PROCEDURE — 97112 NEUROMUSCULAR REEDUCATION: CPT

## 2021-01-15 PROCEDURE — 97110 THERAPEUTIC EXERCISES: CPT

## 2021-01-15 NOTE — PROGRESS NOTES
Daily Note     Today's date: 1/15/2021  Patient name: Cat Harper  : 1960  MRN: [id number]  Referring provider: Terri Luna MD  Dx:   Encounter Diagnosis     ICD-10-CM    1  Left thigh pain  M79 652    2  Generalized weakness  R53 1    3  Chronic neck pain  M54 2     G89 29    4  Cervical radiculopathy  M54 12    5  Lumbar radiculopathy  M54 16                   Subjective: Patient states he is doing okay  Reports he hurt his shoulder while changing his daughters tire the other day  Objective: See treatment diary below      Assessment: Tolerated treatment well  Patient continued with POC as able this visit  He continues to respond well to manuals and noted relief following  No increase in shoulder pain throughout POC  He is slow and diligent with exercises, therefore unable to complete all exercises in POC secondary to time constraint  Patient demonstrated fatigue post treatment, exhibited good technique with therapeutic exercises and would benefit from continued PT      Plan: Continue per plan of care        HEP: supine chin tucks, CODY, seated HS stretch    Specialty Daily Treatment Diary       Manual 1/7 1/12 1/15  1   STM/SOR to cervical RA SMF RA  RA   TPR B UT, LS RA SMF RA  RA   UT; LS stretch RA SMF RA  RA           TPR to L piriformis        L Bryan stretch        BABS aparicio lumbar                Exercise Diary         UBE        UT; LS stretch        Pec stretch (unilaterally)        Chin tucks 5 sec x 30  5 sec x 30 5 sec x30  5 sec x 30   Hooklying Hugs to T's 5 sec x 30  5 sec x 30 5 sec x 30  5 sec x 30            TB Scap Retraction; TB S' Ext        B ER; B Horiz ABD        Scaption                Gripper        Diggiflex                Standing Lumbar Extension deferred Wall sags: 5 sec x 10   2 se           Seated HS stretch     At home   Seated Thoracic Extension c MB        Seated 3 way Forward Trunk Flexion  10 sec x 10 10 sec x 10 10 sec x 10   10 sec x 10 SNAGs c towel Extension        Upper Trap stretch; LS stretch                                CODY -> PPU 2x10 CODY 2x10 CODY 2x10 CODY  2x10 CODY   Glute sets c MH lumbar extension f/b prone quad stretch 5 sec x 30; 20 x 3 ea (PQS) DC GS: 20 sec x 5    5 sec x 30; 20 x 3 ea (PQS)   Prone H' Ext  0# 10 ea              TAC; TAC c BKFO  10 sec x 20 (TAC only)      TAC c Marches        TAC c SLR        TAC c OH Reaching                SKTC; DKTC        PB LTR; PB DKTC 10 sec x 20 ea  10 sec x 20 ea 10 sec x 20 ea  10 sec x 20   2 way Piriformis stretch At home  20 sec x 3 ea   20 sec x 3    Cross Rot stretch c OP        PPT At hhome  NV NV  2 sec x 20                    Prone T        Prone I         Sidelying Thoracic Rotation                                Modalities         10 mins to cs and l/s hooklying pre;  c/s 10 mins to c/s and l/s hooklying pre/ c/s post 10 mins to c/s and l/s hooklying pre/ c/s post  10 mins to cs and l/s hooklying pre;  c/s   CP        Estim 10 min post MH 10 min post   10 min post c       Skin checks performed pre and post application: intact

## 2021-01-17 DIAGNOSIS — G89.4 CHRONIC PAIN SYNDROME: ICD-10-CM

## 2021-01-17 DIAGNOSIS — M47.22 OSTEOARTHRITIS OF SPINE WITH RADICULOPATHY, CERVICAL REGION: ICD-10-CM

## 2021-01-19 ENCOUNTER — TRANSCRIBE ORDERS (OUTPATIENT)
Dept: PHYSICAL THERAPY | Facility: CLINIC | Age: 61
End: 2021-01-19

## 2021-01-19 ENCOUNTER — EVALUATION (OUTPATIENT)
Dept: PHYSICAL THERAPY | Facility: CLINIC | Age: 61
End: 2021-01-19
Payer: COMMERCIAL

## 2021-01-19 DIAGNOSIS — G89.29 CHRONIC NECK PAIN: ICD-10-CM

## 2021-01-19 DIAGNOSIS — M54.16 LUMBAR RADICULOPATHY: Primary | ICD-10-CM

## 2021-01-19 DIAGNOSIS — M54.2 CHRONIC NECK PAIN: ICD-10-CM

## 2021-01-19 DIAGNOSIS — R53.1 GENERALIZED WEAKNESS: ICD-10-CM

## 2021-01-19 DIAGNOSIS — M54.12 CERVICAL RADICULOPATHY: ICD-10-CM

## 2021-01-19 DIAGNOSIS — M79.652 LEFT THIGH PAIN: ICD-10-CM

## 2021-01-19 PROCEDURE — 97110 THERAPEUTIC EXERCISES: CPT | Performed by: PHYSICAL THERAPIST

## 2021-01-19 PROCEDURE — 97112 NEUROMUSCULAR REEDUCATION: CPT | Performed by: PHYSICAL THERAPIST

## 2021-01-19 PROCEDURE — 97140 MANUAL THERAPY 1/> REGIONS: CPT | Performed by: PHYSICAL THERAPIST
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
## 2021-01-20 RX ORDER — MELOXICAM 15 MG/1
TABLET ORAL
Qty: 30 TABLET | Refills: 5 | Status: SHIPPED | OUTPATIENT
Start: 2021-01-20 | End: 2021-08-02

## 2021-01-20 RX ORDER — TRAMADOL HYDROCHLORIDE 50 MG/1
50 TABLET ORAL 4 TIMES DAILY
Qty: 120 TABLET | Refills: 0 | Status: SHIPPED | OUTPATIENT
Start: 2021-01-20 | End: 2021-02-22

## 2021-01-21 ENCOUNTER — OFFICE VISIT (OUTPATIENT)
Dept: PHYSICAL THERAPY | Facility: CLINIC | Age: 61
End: 2021-01-21
Payer: COMMERCIAL

## 2021-01-21 DIAGNOSIS — M54.16 LUMBAR RADICULOPATHY: Primary | ICD-10-CM

## 2021-01-21 DIAGNOSIS — G89.29 CHRONIC NECK PAIN: ICD-10-CM

## 2021-01-21 DIAGNOSIS — R53.1 GENERALIZED WEAKNESS: ICD-10-CM

## 2021-01-21 DIAGNOSIS — M54.2 CHRONIC NECK PAIN: ICD-10-CM

## 2021-01-21 DIAGNOSIS — M54.12 CERVICAL RADICULOPATHY: ICD-10-CM

## 2021-01-21 DIAGNOSIS — M79.652 LEFT THIGH PAIN: ICD-10-CM

## 2021-01-21 PROCEDURE — 97112 NEUROMUSCULAR REEDUCATION: CPT

## 2021-01-21 PROCEDURE — 97014 ELECTRIC STIMULATION THERAPY: CPT

## 2021-01-21 PROCEDURE — 97110 THERAPEUTIC EXERCISES: CPT

## 2021-01-21 PROCEDURE — 97140 MANUAL THERAPY 1/> REGIONS: CPT

## 2021-01-21 NOTE — PROGRESS NOTES
Daily Note     Today's date: 2021  Patient name: Massimo Deng  : 1960  MRN: [id number]  Referring provider: Sury Whiteside MD  Dx:   Encounter Diagnosis     ICD-10-CM    1  Lumbar radiculopathy  M54 16    2  Left thigh pain  M79 652    3  Generalized weakness  R53 1    4  Chronic neck pain  M54 2     G89 29    5  Cervical radiculopathy  M54 12                   Subjective: Pt reports soreness/stiffness present in neck, primarily L side  Low back has been doing "alright"  He states he felt good following last PT session with minimal soreness present  Objective: See treatment diary below      Assessment: Jaylene Fleming displays good tolerance to current POC  Initiated PT sessionwith MH to b/l UT with seated cervical/lumbar stretching  SMT/TPR performed to b/l UT  Increased TTP present along L UT, TPs present throughout b/l UT which improved post manuals  Pt has limited cervical ROM with manual stretching, more limitated on R side  Continued with prescribed exercises with good tolerance, cues required to correct form with chin tucks, good carryover  Pt would benefit from continued PT in effort to further improve cervical ROM and maximize function with reduced pain/frequency of symptoms  Plan: Continue per plan of care        Precautions: hx of cervical fusion       HEP: supine chin tucks, CODY, seated HS stretch    Specialty Daily Treatment Diary       Manual 1/7 1/12 1/15 1/19 1/21   STM/SOR to cervical RA SMF RA [facility]   TPR B UT, LS RA SMF RA SMF JW   UT; LS stretch RA SMF RA [facility]           TPR to L piriformis        L Bryan stretch        BABS aparicio lumbar                Exercise Diary         UBE        UT; LS stretch        Pec stretch (unilaterally)        Chin tucks 5 sec x 30  5 sec x 30 5 sec x30 5 sec x 30 5 sec x 30   Hooklying Hugs to T's 5 sec x 30  5 sec x 30 5 sec x 30 5 sec x 30 5 sec x 30           TB Scap Retraction; TB S' Ext        B ER; B Horiz ABD        Scaption Gripper        Diggiflex                Standing Lumbar Extension deferred Wall sags: 5 sec x 10 Wall sags: 5 sec x 10 Wall sags: 5 sec x 10 Wall sags: 5 sec x 10           Seated HS stretch    20 sec x 3 ea 20 sec x 3 ea   Seated Thoracic Extension c MB        Seated 3 way Forward Trunk Flexion  10 sec x 10 10 sec x 10 10 sec x 10  10 sec x 10 ea 10 sec x 10 ea                   SNAGs c towel Extension        Upper Trap stretch; LS stretch    5 sec x 3 (UT stretch ea side) 15"x3 UT b/l                            CODY -> PPU 2x10 CODY 2x10 CODY 2x10 CODY 2x10 CODY 2x10 CODY   Glute sets c MH lumbar extension f/b prone quad stretch 5 sec x 30; 20 x 3 ea (PQS) DC GS: 20 sec x 5    20 sec x 3 ea side (PQS) 20 sec x 3 ea side (PQS)   Prone H' Ext  0# 10 ea  0# 2x10 ea 0# 2x10 ea           TAC; TAC c BKFO  10 sec x 20 (TAC only)      TAC c Marches        TAC c SLR        TAC c OH Reaching                SKTC; DKTC        PB LTR; PB DKTC 10 sec x 20 ea  10 sec x 20 ea 10 sec x 20 ea 10 sec x 20 ea 5 sec x 20 ea   2 way Piriformis stretch At home  20 sec x 3 ea      Cross Rot stretch c OP        PPT At ome  NV NV 5 sec x 30 5 sec x30                   Prone T        Prone I         Sidelying Thoracic Rotation                                Modalities        MH 10 mins to cs and l/s hooklying pre; MH c/s 10 mins to c/s and l/s hooklying pre/MH c/s post 10 mins to c/s and l/s hooklying pre/MH c/s post 10 mins to R UT in sitting while performing chair exercises; supine c/s MH and low back 10 mins b/l UT in sitting while performing chair exercises    CP        Estim 10 min post MH 10 min post  10 mins post c MH to B UTs 10 mins post b/l UT      Skin checks performed pre and post application: intact

## 2021-01-26 ENCOUNTER — OFFICE VISIT (OUTPATIENT)
Dept: PHYSICAL THERAPY | Facility: CLINIC | Age: 61
End: 2021-01-26
Payer: COMMERCIAL

## 2021-01-26 DIAGNOSIS — M54.16 LUMBAR RADICULOPATHY: Primary | ICD-10-CM

## 2021-01-26 DIAGNOSIS — M54.2 CHRONIC NECK PAIN: ICD-10-CM

## 2021-01-26 DIAGNOSIS — M79.652 LEFT THIGH PAIN: ICD-10-CM

## 2021-01-26 DIAGNOSIS — M54.12 CERVICAL RADICULOPATHY: ICD-10-CM

## 2021-01-26 DIAGNOSIS — R53.1 GENERALIZED WEAKNESS: ICD-10-CM

## 2021-01-26 DIAGNOSIS — G89.29 CHRONIC NECK PAIN: ICD-10-CM

## 2021-01-26 PROCEDURE — 97110 THERAPEUTIC EXERCISES: CPT

## 2021-01-26 NOTE — PROGRESS NOTES
Daily Note     Today's date: 2021  Patient name: Jad Bess  : 1960  MRN: [id number]  Referring provider: Lupe Duong MD  Dx:   Encounter Diagnosis     ICD-10-CM    1  Lumbar radiculopathy  M54 16    2  Left thigh pain  M79 652    3  Generalized weakness  R53 1    4  Chronic neck pain  M54 2     G89 29    5  Cervical radiculopathy  M54 12                   Subjective: Pt reports he's had a migraine, attributes to life stressors  Reports "the back is doing really good, I have a lot of improvement "       Objective: See treatment diary below      Assessment: Tolerated treatment well, w/o onset of pain  Demonstrates Soft tissue restriction in bilateral traps, however, no palpable trigger points noted  Increased tension noted in bilat temporalis, performed STM per subjective report of HA's which pt favored  Pt may benefit from progression of scapular stabilizer strengthening and SNAGs in attempt to manage impairments  Patient exhibited good technique with therapeutic exercises and would benefit from continued PT      Plan: Continue per plan of care        Precautions: hx of cervical fusion       HEP: supine chin tucks, CODY, seated HS stretch    Specialty Daily Treatment Diary       Manual 1/26 1/12 1/15 1/19 1/21   STM/SOR to cervical BMG  SMF [facility]   TPR B UT, LS  SMF RA [facility]   UT; LS stretch BMG [facility]   STM bilat temporalis BMG       TPR to L piriformis        L Bryan stretch        BABS aparicio lumbar                Exercise Diary         UBE        UT; LS stretch        Pec stretch (unilaterally)        Chin tucks 5 sec x30 5 sec x 30 5 sec x30 5 sec x 30 5 sec x 30   Hooklying Hugs to T's 5 sec x30 5 sec x 30 5 sec x 30 5 sec x 30 5 sec x 30           TB Scap Retraction; TB S' Ext        B ER; B Horiz ABD        Scaption                Gripper        Diggiflex                Standing Lumbar Extension Wall sags: 5 sec x 10 Wall sags: 5 sec x 10 Wall sags: 5 sec x 10 Wall sags: 5 sec x 10 Wall sags: 5 sec x 10           Seated HS stretch 30 sec x 3 ea  20 sec x 3 ea 20 sec x 3 ea   Seated Thoracic Extension c MB        Seated 3 way Forward Trunk Flexion  10 sec x10 10 sec x 10 10 sec x 10  10 sec x 10 ea 10 sec x 10 ea                   SNAGs c towel Extension        Upper Trap stretch; LS stretch 15 sec x3 UT b/l    5 sec x 3 (UT stretch ea side) 15"x3 UT b/l                            CODY -> PPU 2x10 OE 2x10 CODY 2x10 CODY 2x10 CODY 2x10 CODY   Glute sets c MH lumbar extension f/b prone quad stretch 20 sec x 3 ea side (PQS)  DC GS: 20 sec x 5    20 sec x 3 ea side (PQS) 20 sec x 3 ea side (PQS)   Prone H' Ext 0# 2x10 ea  0# 10 ea  0# 2x10 ea 0# 2x10 ea           TAC; TAC c BKFO  10 sec x 20 (TAC only)      TAC c Marches        TAC c SLR        TAC c OH Reaching                SKTC; DKTC        PB LTR; PB DKTC 5 sec x20 ea   10 sec x 20 ea 10 sec x 20 ea 10 sec x 20 ea 5 sec x 20 ea   2 way Piriformis stretch  20 sec x 3 ea      Cross Rot stretch c OP        PPT 5 sec x30 NV NV 5 sec x 30 5 sec x30                   Prone T        Prone I         Sidelying Thoracic Rotation                                Modalities        MH 10 mins to cs and l/s hooklying pre; MH c/s 10 mins to c/s and l/s hooklying pre/MH c/s post 10 mins to c/s and l/s hooklying pre/MH c/s post 10 mins to R UT in sitting while performing chair exercises; supine c/s MH and low back 10 mins b/l UT in sitting while performing chair exercises    CP        Estim 10 min post MH 10 min post  10 mins post c MH to B UTs 10 mins post b/l UT      Skin checks performed pre and post application: intact

## 2021-01-28 ENCOUNTER — APPOINTMENT (OUTPATIENT)
Dept: PHYSICAL THERAPY | Facility: CLINIC | Age: 61
End: 2021-01-28
Payer: COMMERCIAL

## 2021-01-29 ENCOUNTER — OFFICE VISIT (OUTPATIENT)
Dept: PHYSICAL THERAPY | Facility: CLINIC | Age: 61
End: 2021-01-29
Payer: COMMERCIAL

## 2021-01-29 DIAGNOSIS — M54.16 LUMBAR RADICULOPATHY: Primary | ICD-10-CM

## 2021-01-29 DIAGNOSIS — R53.1 GENERALIZED WEAKNESS: ICD-10-CM

## 2021-01-29 DIAGNOSIS — M54.12 CERVICAL RADICULOPATHY: ICD-10-CM

## 2021-01-29 DIAGNOSIS — M79.652 LEFT THIGH PAIN: ICD-10-CM

## 2021-01-29 PROCEDURE — 97112 NEUROMUSCULAR REEDUCATION: CPT

## 2021-01-29 PROCEDURE — 97110 THERAPEUTIC EXERCISES: CPT

## 2021-01-29 PROCEDURE — 97140 MANUAL THERAPY 1/> REGIONS: CPT

## 2021-01-29 PROCEDURE — 97014 ELECTRIC STIMULATION THERAPY: CPT

## 2021-01-29 NOTE — PROGRESS NOTES
Daily Note     Today's date: 2021  Patient name: Sylvester Carney  : 1960  MRN: [id number]  Referring provider: Carl Gallo MD  Dx:   Encounter Diagnosis     ICD-10-CM    1  Lumbar radiculopathy  M54 16    2  Left thigh pain  M79 652    3  Generalized weakness  R53 1    4  Cervical radiculopathy  M54 12                   Subjective: Simran Vieyra reports low back continues to feel goo w/ minimal complaints of discomfort  He states the last few days his neck has been bothersome, increased stiffness and pain present  Objective: See treatment diary below      Assessment: Estuardo tolerated PT treatment well  PT session focused primarily on cervical d/t subjective reports  Performed STM/TPR to b/l UT and LS  Signification tissue restriction/tightness present  Pt noted increased TTP on L cervico-occipital region  SNAGS added to exercise program to address suboccipital tightness  Pt instructed to perform 2x daily at home  Added periscapular strengthening to exercise program w/ good tolerance  Concluded with TENS  Pt would benefit from continued PT to further address impairments and maximize functional level  Plan: Continue per plan of care        Precautions: hx of cervical fusion       HEP: supine chin tucks, CODY, seated HS stretch    Specialty Daily Treatment Diary       Manual    STM/SOR to cervical BMG  [facility]   TPR B UT, LS  JW  SMF JW   UT; LS stretch BMG   SMF    STM bilat temporalis BMG       TPR to L piriformis        L Bryan stretch        BABS aparicio lumbar                Exercise Diary         UBE        UT; LS stretch        Pec stretch (unilaterally)        Chin tucks 5 sec x30 5 sec x30  5 sec x 30 5 sec x 30   Hooklying Hugs to T's 5 sec x30 5 sec x30  5 sec x 30 5 sec x 30           TB Scap Retraction; TB S' Ext  GTB 2x10 ea       B ER; B Horiz ABD        Scaption                Gripper        Diggiflex                Standing Lumbar Extension Wall sags: 5 sec x 10 NV  Wall sags: 5 sec x 10 Wall sags: 5 sec x 10           Seated HS stretch 30 sec x 3 ea  30 sec x 3 ea  20 sec x 3 ea 20 sec x 3 ea   Seated Thoracic Extension c MB        Seated 3 way Forward Trunk Flexion  10 sec x10 10 sec x10  10 sec x 10 ea 10 sec x 10 ea                   SNAGs c towel Extension  x20 & rotation       Upper Trap stretch; LS stretch 15 sec x3 UT b/l  15 sec x3 UT b/l   5 sec x 3 (UT stretch ea side) 15"x3 UT b/l                            CODY -> PPU 2x10 OE NV  2x10 CODY 2x10 CODY   Glute sets c MH lumbar extension f/b prone quad stretch 20 sec x 3 ea side (PQS)  NV  20 sec x 3 ea side (PQS) 20 sec x 3 ea side (PQS)   Prone H' Ext 0# 2x10 ea  NV  0# 2x10 ea 0# 2x10 ea           TAC; TAC c BKFO        TAC c Marches        TAC c SLR        TAC c OH Reaching                SKTC; DKTC        PB LTR; PB DKTC 5 sec x20 ea   NV  10 sec x 20 ea 5 sec x 20 ea   2 way Piriformis stretch        Cross Rot stretch c OP        PPT 5 sec x30 NV  5 sec x 30 5 sec x30                   Prone T        Prone I         Sidelying Thoracic Rotation                                Modalities        MH 10 mins to cs and l/s hooklying pre; MH c/s 10 mins b/l UT in sitting while performing chair exercises  10 mins to R UT in sitting while performing chair exercises; supine c/s MH and low back 10 mins b/l UT in sitting while performing chair exercises    CP        Estim 10 min post MH 10 min post   10 mins post c MH to B UTs 10 mins post b/l UT      Skin checks performed pre and post application: intact

## 2021-02-02 ENCOUNTER — APPOINTMENT (OUTPATIENT)
Dept: PHYSICAL THERAPY | Facility: CLINIC | Age: 61
End: 2021-02-02
Payer: COMMERCIAL

## 2021-02-03 NOTE — PROGRESS NOTES
Daily Note     Today's date: 2021  Patient name: Godfrey Kelly  : 1960  MRN: [id number]  Referring provider: Elke Parekh MD  Dx:   Encounter Diagnosis     ICD-10-CM    1  Lumbar radiculopathy  M54 16    2  Left thigh pain  M79 652    3  Generalized weakness  R53 1    4  Cervical radiculopathy  M54 12                   Subjective: Emperatriz Smith reports low back continues to feel good w/ minimal complaints of discomfort  He states the last few days his neck has been bothersome, increased stiffness and pain present  Objective: See treatment diary below      Assessment: Estuardo tolerated PT treatment well  PT session focused primarily on cervical d/t subjective reports  Performed STM/TPR to b/l UT and LS  Signification tissue restriction/tightness present  Pt noted increased TTP on L cervico-occipital region  SNAGS added to exercise program to address suboccipital tightness  Pt instructed to perform 2x daily at home  Added periscapular strengthening to exercise program w/ good tolerance  Concluded with TENS  Pt would benefit from continued PT to further address impairments and maximize functional level  Plan: Continue per plan of care        Precautions: hx of cervical fusion       HEP: supine chin tucks, CODY, seated HS stretch    Specialty Daily Treatment Diary       Manual    STM/SOR to cervical BMG  [facility]   TPR B UT, LS  JW  SMF JW   UT; LS stretch BMG   SMF    STM bilat temporalis BMG       TPR to L piriformis        L Bryan stretch        BABS aparicio lumbar                Exercise Diary         UBE        UT; LS stretch        Pec stretch (unilaterally)        Chin tucks 5 sec x30 5 sec x30  5 sec x 30 5 sec x 30   Hooklying Hugs to T's 5 sec x30 5 sec x30  5 sec x 30 5 sec x 30           TB Scap Retraction; TB S' Ext  GTB 2x10 ea       B ER; B Horiz ABD        Scaption                Gripper        Diggiflex                Standing Lumbar Extension Wall sags: 5 sec x 10 NV Wall sags: 5 sec x 10 Wall sags: 5 sec x 10           Seated HS stretch 30 sec x 3 ea  30 sec x 3 ea  20 sec x 3 ea 20 sec x 3 ea   Seated Thoracic Extension c MB        Seated 3 way Forward Trunk Flexion  10 sec x10 10 sec x10  10 sec x 10 ea 10 sec x 10 ea                   SNAGs c towel Extension  x20 & rotation       Upper Trap stretch; LS stretch 15 sec x3 UT b/l  15 sec x3 UT b/l   5 sec x 3 (UT stretch ea side) 15"x3 UT b/l                            CODY -> PPU 2x10 OE NV  2x10 CODY 2x10 CODY   Glute sets c MH lumbar extension f/b prone quad stretch 20 sec x 3 ea side (PQS)  NV  20 sec x 3 ea side (PQS) 20 sec x 3 ea side (PQS)   Prone H' Ext 0# 2x10 ea  NV  0# 2x10 ea 0# 2x10 ea           TAC; TAC c BKFO        TAC c Marches        TAC c SLR        TAC c OH Reaching                SKTC; DKTC        PB LTR; PB DKTC 5 sec x20 ea   NV  10 sec x 20 ea 5 sec x 20 ea   2 way Piriformis stretch        Cross Rot stretch c OP        PPT 5 sec x30 NV  5 sec x 30 5 sec x30                   Prone T        Prone I         Sidelying Thoracic Rotation                                Modalities        MH 10 mins to cs and l/s hooklying pre; MH c/s 10 mins b/l UT in sitting while performing chair exercises  10 mins to R UT in sitting while performing chair exercises; supine c/s MH and low back 10 mins b/l UT in sitting while performing chair exercises    CP        Estim 10 min post MH 10 min post   10 mins post c MH to B UTs 10 mins post b/l UT      Skin checks performed pre and post application: intact

## 2021-02-04 ENCOUNTER — APPOINTMENT (OUTPATIENT)
Dept: PHYSICAL THERAPY | Facility: CLINIC | Age: 61
End: 2021-02-04
Payer: COMMERCIAL

## 2021-02-09 ENCOUNTER — OFFICE VISIT (OUTPATIENT)
Dept: PHYSICAL THERAPY | Facility: CLINIC | Age: 61
End: 2021-02-09
Payer: COMMERCIAL

## 2021-02-09 DIAGNOSIS — M79.652 LEFT THIGH PAIN: ICD-10-CM

## 2021-02-09 DIAGNOSIS — M54.16 LUMBAR RADICULOPATHY: Primary | ICD-10-CM

## 2021-02-09 DIAGNOSIS — M54.12 CERVICAL RADICULOPATHY: ICD-10-CM

## 2021-02-09 DIAGNOSIS — G89.29 CHRONIC NECK PAIN: ICD-10-CM

## 2021-02-09 DIAGNOSIS — R53.1 GENERALIZED WEAKNESS: ICD-10-CM

## 2021-02-09 DIAGNOSIS — M54.2 CHRONIC NECK PAIN: ICD-10-CM

## 2021-02-09 PROCEDURE — 97112 NEUROMUSCULAR REEDUCATION: CPT

## 2021-02-09 PROCEDURE — 97014 ELECTRIC STIMULATION THERAPY: CPT

## 2021-02-09 PROCEDURE — 97140 MANUAL THERAPY 1/> REGIONS: CPT

## 2021-02-09 PROCEDURE — 97110 THERAPEUTIC EXERCISES: CPT

## 2021-02-09 NOTE — PROGRESS NOTES
Daily Note     Today's date: 2021  Patient name: Godfrey Kelly  : 1960  MRN: [id number]  Referring provider: Elke Parekh MD  Dx:   Encounter Diagnosis     ICD-10-CM    1  Lumbar radiculopathy  M54 16    2  Left thigh pain  M79 652    3  Generalized weakness  R53 1    4  Cervical radiculopathy  M54 12    5  Chronic neck pain  M54 2     G89 29                   Subjective: Patient states he is still feeling soreness from shoveling  Objective: See treatment diary below      Assessment: Tolerated treatment well  Initiated POC In supine with MHP while performing stretching  Unable to get through all TE due to time constraint, will resume next visit  Encouraged patient to complete stretches at home  He continues to be limited with cervical ROM and pain  Patient demonstrated fatigue post treatment and would benefit from continued PT      Plan: Continue per plan of care  Precautions: hx of cervical fusion       HEP: supine chin tucks, CODY, seated HS stretch    Specialty Daily Treatment Diary       Manual 1/26 2/9 1/15 1/19 1/21   STM/SOR to cervical BMG  [facility]   TPR B UT, LS  RA [facility]   UT; LS stretch BMG [facility]   STM bilat temporalis BMG       TPR to L piriformis        L Bryan stretch        PA markus lumbar                Exercise Diary         UBE        UT; LS stretch        Pec stretch (unilaterally)        Chin tucks 5 sec x30 5 sec x 30 5 sec x30 5 sec x 30 5 sec x 30   Hooklying Hugs to T's 5 sec x30 5 sec x 30 5 sec x 30 5 sec x 30 5 sec x 30           TB Scap Retraction; TB S' Ext        B ER; B Horiz ABD        Scaption                Gripper        Diggiflex                Standing Lumbar Extension Wall sags: 5 sec x 10  Wall sags: 5 sec x 10 Wall sags: 5 sec x 10 Wall sags: 5 sec x 10           Seated HS stretch 30 sec x 3 ea    30 sec x 3  20 sec x 3 ea 20 sec x 3 ea   Seated Thoracic Extension c MB        Seated 3 way Forward Trunk Flexion  10 sec x10  10 sec x 10 10 sec x 10 ea 10 sec x 10 ea                   SNAGs c towel Extension        Upper Trap stretch; LS stretch 15 sec x3 UT b/l  HEP  5 sec x 3 (UT stretch ea side) 15"x3 UT b/l                            CODY -> PPU 2x10 OE  2x10 CODY 2x10 CODY 2x10 CODY   Glute sets c MH lumbar extension f/b prone quad stretch 20 sec x 3 ea side (PQS)     20 sec x 3 ea side (PQS) 20 sec x 3 ea side (PQS)   Prone H' Ext 0# 2x10 ea  0# 2x10 ea 0# 2x10 ea           TAC; TAC c BKFO        TAC c Marches        TAC c SLR        TAC c OH Reaching                SKTC; DKTC        PB LTR; PB DKTC 5 sec x20 ea   5 sec x 20 ea 10 sec x 20 ea 10 sec x 20 ea 5 sec x 20 ea   2 way Piriformis stretch        Cross Rot stretch c OP        PPT 5 sec x30 5 sec x 30 NV 5 sec x 30 5 sec x30                   Prone T        Prone I         Sidelying Thoracic Rotation                                Modalities        MH 10 mins to cs and l/s hooklying pre; MH c/s 10 mins to c/s and l/s hooklying pre/MH c/s post 10 mins to c/s and l/s hooklying pre/MH c/s post 10 mins to R UT in sitting while performing chair exercises; supine c/s MH and low back 10 mins b/l UT in sitting while performing chair exercises    CP        Estim 10 min post MH 10 min post  10 mins post c MH to B UTs 10 mins post b/l UT      Skin checks performed pre and post application: intact

## 2021-02-12 ENCOUNTER — OFFICE VISIT (OUTPATIENT)
Dept: PHYSICAL THERAPY | Facility: CLINIC | Age: 61
End: 2021-02-12
Payer: COMMERCIAL

## 2021-02-12 DIAGNOSIS — M79.652 LEFT THIGH PAIN: ICD-10-CM

## 2021-02-12 DIAGNOSIS — G89.29 CHRONIC NECK PAIN: Primary | ICD-10-CM

## 2021-02-12 DIAGNOSIS — M54.16 LUMBAR RADICULOPATHY: ICD-10-CM

## 2021-02-12 DIAGNOSIS — R53.1 GENERALIZED WEAKNESS: ICD-10-CM

## 2021-02-12 DIAGNOSIS — M54.12 CERVICAL RADICULOPATHY: ICD-10-CM

## 2021-02-12 DIAGNOSIS — M54.2 CHRONIC NECK PAIN: Primary | ICD-10-CM

## 2021-02-12 PROCEDURE — 97140 MANUAL THERAPY 1/> REGIONS: CPT

## 2021-02-12 PROCEDURE — 97110 THERAPEUTIC EXERCISES: CPT | Performed by: PHYSICAL THERAPIST

## 2021-02-12 NOTE — PROGRESS NOTES
Daily Note     Today's date: 2021  Patient name: Enriqueta Johnson  : 1960  MRN: [id number]  Referring provider: Eva Bee MD  Dx:   Encounter Diagnosis     ICD-10-CM    1  Chronic neck pain  M54 2     G89 29    2  Lumbar radiculopathy  M54 16    3  Left thigh pain  M79 652    4  Generalized weakness  R53 1    5  Cervical radiculopathy  M54 12                   Subjective: Pt reported that he has been feeling an improvement in lumbar pain, however stated that his cervical spine has been persisting with pain and stiffness, which he attributes to recent increases in stress  Objective: See treatment diary below      Assessment: Tolerated treatment well  Noted significant improvement in symptoms following manual STM to L UT  Patient demonstrated fatigue post treatment, exhibited good technique with therapeutic exercises and would benefit from continued PT  Plan: Continue per plan of care  Precautions: hx of cervical fusion     HEP: supine chin tucks, CODY, seated HS stretch    Specialty Daily Treatment Diary       Manual      STM/SOR to cervical BMG  RA JZ     TPR B UT, LS  RA JZ     UT; LS stretch BMG RA JZ     STM bilat temporalis BMG       TPR to L piriformis        L Bryan stretch        PA markus lumbar                Exercise Diary         UBE        UT; LS stretch        Pec stretch (unilaterally)        Chin tucks 5 sec x30 5 sec x 30 5 sec x30      Hooklying Hugs to T's 5 sec x30 5 sec x 30 5 sec x 30             TB Scap Retraction; TB S' Ext        B ER; B Horiz ABD        Scaption                Gripper        Diggiflex                Standing Lumbar Extension Wall sags: 5 sec x 10               Seated HS stretch 30 sec x 3 ea    30 sec x 3 30 sec x3     Seated Thoracic Extension c MB   20x     Seated 3 way Forward Trunk Flexion  10 sec x10  10 sec x10                     SNAGs c towel Extension        Upper Trap stretch; LS stretch 15 sec x3 UT b/l  HEP CODY -> PPU 2x10 OE       Glute sets c MH lumbar extension f/b prone quad stretch 20 sec x 3 ea side (PQS)        Prone H' Ext 0# 2x10 ea  TAC; TAC c BKFO        TAC c Marches        TAC c SLR        TAC c OH Reaching                SKTC; DKTC        PB LTR; PB DKTC 5 sec x20 ea   5 sec x 20 ea 5 sec x 20 ea     2 way Piriformis stretch        Cross Rot stretch c OP        PPT 5 sec x30 5 sec x 30 5 sec x 30                      Prone T        Prone I         Sidelying Thoracic Rotation                                Modalities        MH 10 mins to cs and l/s hooklying pre; MH c/s 10 mins to c/s and l/s hooklying pre/MH c/s post 10 mins to c/s and l/s hooklying pre/MH c/s post     CP        Estim 10 min post MH 10 min post 10 min post         Skin checks performed pre and post application: intact

## 2021-02-16 ENCOUNTER — OFFICE VISIT (OUTPATIENT)
Dept: PHYSICAL THERAPY | Facility: CLINIC | Age: 61
End: 2021-02-16
Payer: COMMERCIAL

## 2021-02-16 DIAGNOSIS — M79.652 LEFT THIGH PAIN: ICD-10-CM

## 2021-02-16 DIAGNOSIS — R53.1 GENERALIZED WEAKNESS: ICD-10-CM

## 2021-02-16 DIAGNOSIS — M54.2 CHRONIC NECK PAIN: Primary | ICD-10-CM

## 2021-02-16 DIAGNOSIS — M54.16 LUMBAR RADICULOPATHY: ICD-10-CM

## 2021-02-16 DIAGNOSIS — G89.29 CHRONIC NECK PAIN: Primary | ICD-10-CM

## 2021-02-16 DIAGNOSIS — M54.12 CERVICAL RADICULOPATHY: ICD-10-CM

## 2021-02-16 PROCEDURE — 97110 THERAPEUTIC EXERCISES: CPT

## 2021-02-16 PROCEDURE — 97140 MANUAL THERAPY 1/> REGIONS: CPT

## 2021-02-16 PROCEDURE — 97112 NEUROMUSCULAR REEDUCATION: CPT

## 2021-02-16 NOTE — PROGRESS NOTES
Daily Note     Today's date: 2021  Patient name: Marion Javier  : 1960  MRN: [id number]  Referring provider: Lazarus Piano, MD  Dx:   Encounter Diagnosis     ICD-10-CM    1  Chronic neck pain  M54 2     G89 29    2  Lumbar radiculopathy  M54 16    3  Left thigh pain  M79 652    4  Generalized weakness  R53 1    5  Cervical radiculopathy  M54 12                   Subjective:  Patient states he is feeling alright this visit  Objective: See treatment diary below      Assessment: Tolerated treatment well  Focused today's session on cervical  Encouraged patient to focus on lumbar at home  Good tolerance to manuals  Consider adding in cervical strengthening NV  Patient demonstrated fatigue post treatment, exhibited good technique with therapeutic exercises and would benefit from continued PT      Plan: Continue per plan of care  Precautions: hx of cervical fusion     HEP: supine chin tucks, CODY, seated HS stretch    Specialty Daily Treatment Diary       Manual     STM/SOR to cervical BMG  RA JZ RA    TPR B UT, LS  RA JZ RA    UT; LS stretch BMG RA RICARDOZ RA    STM bilat temporalis BMG       TPR to L piriformis        L Bryan stretch        BABS aparicio lumbar                Exercise Diary         UBE        UT; LS stretch        Pec stretch (unilaterally)        Chin tucks 5 sec x30 5 sec x 30 5 sec x30  5 sec x 30     Hooklying Hugs to T's 5 sec x30 5 sec x 30 5 sec x 30 5 sec x 30             TB Scap Retraction; TB S' Ext    NV    B ER; B Horiz ABD    NV    Scaption    NV            Gripper        Diggiflex                Standing Lumbar Extension Wall sags: 5 sec x 10               Seated HS stretch 30 sec x 3 ea    30 sec x 3 30 sec x3 30 sec x 3     Seated Thoracic Extension c MB   20x 15x     Seated 3 way Forward Trunk Flexion  10 sec x10  10 sec x10 10 sec x 10                     SNAGs c towel Extension        Upper Trap stretch; LS stretch 15 sec x3 UT b/l  HEP CODY -> PPU 2x10 OE       Glute sets c MH lumbar extension f/b prone quad stretch 20 sec x 3 ea side (PQS)        Prone H' Ext 0# 2x10 ea  TAC; TAC c BKFO        TAC c Marches        TAC c SLR        TAC c OH Reaching                SKTC; DKTC        PB LTR; PB DKTC 5 sec x20 ea   5 sec x 20 ea 5 sec x 20 ea 5 sec x 20    2 way Piriformis stretch        Cross Rot stretch c OP        PPT 5 sec x30 5 sec x 30 5 sec x 30  HEP                     Prone T        Prone I         Sidelying Thoracic Rotation                                Modalities        MH 10 mins to cs and l/s hooklying pre; MH c/s 10 mins to c/s and l/s hooklying pre/MH c/s post 10 mins to c/s and l/s hooklying pre/MH c/s post 10 mins to c/s and l/s hooklying pre/MH c/s post    CP        Estim 10 min post MH 10 min post 10 min post  NV       Skin checks performed pre and post application: intact

## 2021-02-20 DIAGNOSIS — G89.4 CHRONIC PAIN SYNDROME: ICD-10-CM

## 2021-02-22 ENCOUNTER — APPOINTMENT (OUTPATIENT)
Dept: PHYSICAL THERAPY | Facility: CLINIC | Age: 61
End: 2021-02-22
Payer: COMMERCIAL

## 2021-02-22 RX ORDER — TRAMADOL HYDROCHLORIDE 50 MG/1
50 TABLET ORAL 4 TIMES DAILY
Qty: 120 TABLET | Refills: 0 | Status: SHIPPED | OUTPATIENT
Start: 2021-02-22 | End: 2021-03-29

## 2021-02-24 ENCOUNTER — OFFICE VISIT (OUTPATIENT)
Dept: PHYSICAL THERAPY | Facility: CLINIC | Age: 61
End: 2021-02-24
Payer: COMMERCIAL

## 2021-02-24 DIAGNOSIS — G89.29 CHRONIC NECK PAIN: Primary | ICD-10-CM

## 2021-02-24 DIAGNOSIS — R53.1 GENERALIZED WEAKNESS: ICD-10-CM

## 2021-02-24 DIAGNOSIS — M79.652 LEFT THIGH PAIN: ICD-10-CM

## 2021-02-24 DIAGNOSIS — M54.2 CHRONIC NECK PAIN: Primary | ICD-10-CM

## 2021-02-24 DIAGNOSIS — M54.12 CERVICAL RADICULOPATHY: ICD-10-CM

## 2021-02-24 DIAGNOSIS — M54.16 LUMBAR RADICULOPATHY: ICD-10-CM

## 2021-02-24 PROCEDURE — 97112 NEUROMUSCULAR REEDUCATION: CPT

## 2021-02-24 PROCEDURE — 97014 ELECTRIC STIMULATION THERAPY: CPT

## 2021-02-24 PROCEDURE — 97110 THERAPEUTIC EXERCISES: CPT

## 2021-02-24 PROCEDURE — 97140 MANUAL THERAPY 1/> REGIONS: CPT

## 2021-02-24 NOTE — PROGRESS NOTES
Daily Note     Today's date: 2021  Patient name: Rudolph Soni  : 1960  MRN: [id number]  Referring provider: Elvia Godwin MD  Dx:   Encounter Diagnosis     ICD-10-CM    1  Chronic neck pain  M54 2     G89 29    2  Left thigh pain  M79 652    3  Lumbar radiculopathy  M54 16    4  Generalized weakness  R53 1    5  Cervical radiculopathy  M54 12                   Subjective: Faith Monge reports he has been very stressed the last few days which is affecting his whole body  He states he continues to experience muscle spasms in neck and back  Objective: See treatment diary below      Assessment: Faith Monge displays good tolerance to current POC  PT session focused on cervical  Added periscapular strengthening to exercise program today  Fatigue evident with ER/ABD exercises secondary to weakness  Pt continues to favor STM to b/l UT and paraspinals  Pt presents with increased restriction along b/l UT primarily along medial scapular border  Concluded with Estim  Pt would benefit from continued PT to further address impairments and maximize functional level  Plan: Continue per plan of care        Precautions: hx of cervical fusion     HEP: supine chin tucks, CODY, seated HS stretch    Specialty Daily Treatment Diary       Manual    STM/SOR to cervical BMG  RA BRINA [facility] JW   TPR B UT, LS  RA JROSE MCKEON JW   UT; LS stretch BMG RA BRINA RA    STM bilat temporalis BMG       TPR to L piriformis        L Bryan stretch        BABS aparicio lumbar                Exercise Diary         UBE        UT; LS stretch        Pec stretch (unilaterally)        Chin tucks 5 sec x30 5 sec x 30 5 sec x30  5 sec x 30  5 sec x 30   Hooklying Hugs to T's 5 sec x30 5 sec x 30 5 sec x 30 5 sec x 30             TB Scap Retraction; TB S' Ext    NV GTB 3x10 ea   B ER; B Horiz ABD    NV GTB 2x10 ea    Scaption    NV 2x10           Gripper        Diggiflex                Standing Lumbar Extension Wall sags: 5 sec x 10 Seated HS stretch 30 sec x 3 ea  30 sec x 3 30 sec x3 30 sec x 3  30 sec x 3    Seated Thoracic Extension c MB   20x 15x     Seated 3 way Forward Trunk Flexion  10 sec x10  10 sec x10 10 sec x 10  10 sec x 10                    SNAGs c towel Extension        Upper Trap stretch; LS stretch 15 sec x3 UT b/l  HEP                              CODY -> PPU 2x10 OE       Glute sets c MH lumbar extension f/b prone quad stretch 20 sec x 3 ea side (PQS)        Prone H' Ext 0# 2x10 ea  TAC; TAC c BKFO        TAC c Marches        TAC c SLR        TAC c OH Reaching                SKTC; DKTC        PB LTR; PB DKTC 5 sec x20 ea   5 sec x 20 ea 5 sec x 20 ea 5 sec x 20    2 way Piriformis stretch        Cross Rot stretch c OP        PPT 5 sec x30 5 sec x 30 5 sec x 30  HEP                     Prone T        Prone I         Sidelying Thoracic Rotation                                Modalities         10 mins to cs and l/s hooklying pre;  c/s 10 mins to c/s and l/s hooklying pre/ c/s post 10 mins to c/s and l/s hooklying pre/ c/s post 10 mins to c/s and l/s hooklying pre/ c/s post 10 min c/s c stretching pre    CP        Estim 10 min post MH 10 min post 10 min post  NV 10 min post       Skin checks performed pre and post application: intact

## 2021-02-26 ENCOUNTER — OFFICE VISIT (OUTPATIENT)
Dept: PHYSICAL THERAPY | Facility: CLINIC | Age: 61
End: 2021-02-26
Payer: COMMERCIAL

## 2021-02-26 DIAGNOSIS — R53.1 GENERALIZED WEAKNESS: ICD-10-CM

## 2021-02-26 DIAGNOSIS — M54.2 CHRONIC NECK PAIN: ICD-10-CM

## 2021-02-26 DIAGNOSIS — M79.652 LEFT THIGH PAIN: ICD-10-CM

## 2021-02-26 DIAGNOSIS — M54.12 CERVICAL RADICULOPATHY: Primary | ICD-10-CM

## 2021-02-26 DIAGNOSIS — M54.16 LUMBAR RADICULOPATHY: ICD-10-CM

## 2021-02-26 DIAGNOSIS — G89.29 CHRONIC NECK PAIN: ICD-10-CM

## 2021-02-26 PROCEDURE — 97110 THERAPEUTIC EXERCISES: CPT | Performed by: PHYSICAL THERAPIST

## 2021-02-26 PROCEDURE — 97140 MANUAL THERAPY 1/> REGIONS: CPT | Performed by: PHYSICAL THERAPIST

## 2021-02-26 NOTE — PROGRESS NOTES
Daily Note     Today's date: 2021  Patient name: Rudolph Soni  : 1960  MRN: [id number]  Referring provider: Elvia Godwin MD  Dx:   Encounter Diagnosis     ICD-10-CM    1  Cervical radiculopathy  M54 12    2  Chronic neck pain  M54 2     G89 29    3  Left thigh pain  M79 652    4  Lumbar radiculopathy  M54 16    5  Generalized weakness  R53 1                   Subjective: Pt noted that he has been experiencing persisting cervical pain and stiffness, however stated that his symptoms have been improving  Objective: See treatment diary below      Assessment: Tolerated treatment well  In order to address scapular and postural weakness initiated dumbbell shoulder flexion and abduction, which he performed well without pain  Patient demonstrated fatigue post treatment, exhibited good technique with therapeutic exercises and would benefit from continued PT  Plan: Continue per plan of care        Precautions: hx of cervical fusion     HEP: supine chin tucks, CODY, seated HS stretch    Specialty Daily Treatment Diary       Manual    STM/SOR to cervical JZ   LINDA LAMAR   TPR B UT, LS JZ   LINDA LMAAR   UT; LS stretch JROSE MCKEON    [facility] bilat temporalis        TPR to L piriformis        L Bryan stretch        BABS aparicio lumbar                Exercise Diary         UBE        UT; LS stretch        Pec stretch (unilaterally)        Chin tucks    5 sec x 30  5 sec x 30   Hooklying Hugs to T's    5 sec x 30             TB Scap Retraction; TB S' Ext 20/ 3x10  15/ 3x10   NV GTB 3x10 ea   B ER; B Horiz ABD 3/ 3x10  5/ 3x10   NV GTB 2x10 ea    Scaption    NV 2x10           Gripper        Diggiflex                Standing Lumbar Extension                Seated HS stretch :30x3   30 sec x 3  30 sec x 3    Seated Thoracic Extension c MB    15x     Seated 3 way Forward Trunk Flexion     10 sec x 10  10 sec x 10    Sh abd dumbbell 4# 3x10       Sh flexion dumbbell 4# 3x10       SNAGs c towel Extension Upper Trap stretch; LS stretch                                CODY -> PPU        Glute sets c MH lumbar extension f/b prone quad stretch        Prone H' Ext                TAC; TAC c BKFO        TAC c Marches        TAC c SLR        TAC c OH Reaching                SKTC; DKTC        PB LTR; PB DKTC    5 sec x 20    2 way Piriformis stretch        Cross Rot stretch c OP        PPT    HEP                     Prone T        Prone I         Sidelying Thoracic Rotation                                Modalities        MH    10 mins to c/s and l/s hooklying pre/MH c/s post 10 min c/s c stretching pre    CP        Estim 10   NV 10 min post       Skin checks performed pre and post application: intact

## 2021-03-01 ENCOUNTER — OFFICE VISIT (OUTPATIENT)
Dept: PHYSICAL THERAPY | Facility: CLINIC | Age: 61
End: 2021-03-01
Payer: COMMERCIAL

## 2021-03-01 DIAGNOSIS — M54.2 CHRONIC NECK PAIN: ICD-10-CM

## 2021-03-01 DIAGNOSIS — R53.1 GENERALIZED WEAKNESS: Primary | ICD-10-CM

## 2021-03-01 DIAGNOSIS — G89.29 CHRONIC NECK PAIN: ICD-10-CM

## 2021-03-01 DIAGNOSIS — M54.16 LUMBAR RADICULOPATHY: ICD-10-CM

## 2021-03-01 DIAGNOSIS — M79.652 LEFT THIGH PAIN: ICD-10-CM

## 2021-03-01 DIAGNOSIS — M54.12 CERVICAL RADICULOPATHY: ICD-10-CM

## 2021-03-01 PROCEDURE — 97140 MANUAL THERAPY 1/> REGIONS: CPT | Performed by: PHYSICAL THERAPIST

## 2021-03-01 PROCEDURE — 97110 THERAPEUTIC EXERCISES: CPT | Performed by: PHYSICAL THERAPIST

## 2021-03-04 ENCOUNTER — OFFICE VISIT (OUTPATIENT)
Dept: PHYSICAL THERAPY | Facility: CLINIC | Age: 61
End: 2021-03-04
Payer: COMMERCIAL

## 2021-03-04 DIAGNOSIS — M79.652 LEFT THIGH PAIN: ICD-10-CM

## 2021-03-04 DIAGNOSIS — R53.1 GENERALIZED WEAKNESS: Primary | ICD-10-CM

## 2021-03-04 DIAGNOSIS — M54.12 CERVICAL RADICULOPATHY: ICD-10-CM

## 2021-03-04 DIAGNOSIS — G89.29 CHRONIC NECK PAIN: ICD-10-CM

## 2021-03-04 DIAGNOSIS — M54.2 CHRONIC NECK PAIN: ICD-10-CM

## 2021-03-04 DIAGNOSIS — M54.16 LUMBAR RADICULOPATHY: ICD-10-CM

## 2021-03-04 PROCEDURE — 97112 NEUROMUSCULAR REEDUCATION: CPT

## 2021-03-04 PROCEDURE — 97110 THERAPEUTIC EXERCISES: CPT

## 2021-03-04 PROCEDURE — 97140 MANUAL THERAPY 1/> REGIONS: CPT

## 2021-03-04 PROCEDURE — 97014 ELECTRIC STIMULATION THERAPY: CPT

## 2021-03-04 NOTE — PROGRESS NOTES
Daily Note     Today's date: 3/4/2021  Patient name: Ulysses Hendrickson  : 1960  MRN: [id number]  Referring provider: Romel Lund MD  Dx:   Encounter Diagnosis     ICD-10-CM    1  Generalized weakness  R53 1    2  Cervical radiculopathy  M54 12    3  Chronic neck pain  M54 2     G89 29    4  Left thigh pain  M79 652    5  Lumbar radiculopathy  M54 16                   Subjective: Patient states he worked on himself yesterday and is feeling pretty good today  Objective: See treatment diary below      Assessment: Tolerated treatment well  Focused today on manual treatment as he feels most relief from this  Continued with exercises focused on stretching cervical region and thoracic  conlcuded with e-stim  Patient demonstrated fatigue post treatment, exhibited good technique with therapeutic exercises and would benefit from continued PT      Plan: Continue per plan of care        Precautions: hx of cervical fusion     HEP: supine chin tucks, CODY, seated HS stretch    Specialty Daily Treatment Diary       Manual 2/26 3/1 3/4 2/16 2/24   STM/SOR to cervical JZ [facility]   TPR B UT, LS JZ [facility]   UT; LS stretch Z [facility] bilat temporalis        TPR to L piriformis        L Bryan stretch        PA glides lumbar                Exercise Diary         UBE        UT; LS stretch        Pec stretch (unilaterally)        Chin tucks  2 sec x 10 (p!) 2 sec x 10 5 sec x 30  5 sec x 30   Hooklying Hugs to T's  5 sec x 30  5 sec x 30             TB Scap Retraction; TB S' Ext 20/ 3x10  15/ 3x10   NV GTB 3x10 ea   B ER; B Horiz ABD 3/ 3x10  5/ 3x10   NV GTB 2x10 ea    Scaption    NV 2x10           Gripper        Diggiflex                Standing Lumbar Extension                Seated HS stretch :30x3   30 sec x 3  30 sec x 3    Seated Thoracic Extension c MB  Half foam: 10x Half foam 10x  15x     Seated 3 way Forward Trunk Flexion     10 sec x 10  10 sec x 10    Sh abd dumbbell 4# 3x10       Sh flexion dumbbell 4# 3x10       SNAGs c towel Extension; Rotation  10 ea 10 ea      Upper Trap stretch; LS stretch                                CODY -> PPU        Glute sets c MH lumbar extension f/b prone quad stretch        Prone H' Ext                TAC; TAC c BKFO        TAC c Marches        TAC c SLR        TAC c OH Reaching                SKTC; DKTC        PB LTR; PB DKTC  5 sec x 20 ea 5 sec x 20  5 sec x 20    2 way Piriformis stretch        Cross Rot stretch c OP        PPT    HEP                     Prone T        Prone I         Sidelying Thoracic Rotation                                Modalities        MH  20 mins to C/S and L/s in hooklying while performing exercises (pre) 10 min in seated while performing exercises,  10 mins to c/s and l/s hooklying pre/MH c/s post 10 min c/s c stretching pre    CP        Estim 10 10 min post  NV 10 min post       Skin checks performed pre and post application: intact

## 2021-03-08 ENCOUNTER — OFFICE VISIT (OUTPATIENT)
Dept: PHYSICAL THERAPY | Facility: CLINIC | Age: 61
End: 2021-03-08
Payer: COMMERCIAL

## 2021-03-08 DIAGNOSIS — G89.29 CHRONIC NECK PAIN: ICD-10-CM

## 2021-03-08 DIAGNOSIS — M54.12 CERVICAL RADICULOPATHY: ICD-10-CM

## 2021-03-08 DIAGNOSIS — M54.16 LUMBAR RADICULOPATHY: ICD-10-CM

## 2021-03-08 DIAGNOSIS — M79.652 LEFT THIGH PAIN: ICD-10-CM

## 2021-03-08 DIAGNOSIS — R53.1 GENERALIZED WEAKNESS: Primary | ICD-10-CM

## 2021-03-08 DIAGNOSIS — M54.2 CHRONIC NECK PAIN: ICD-10-CM

## 2021-03-08 PROCEDURE — 97112 NEUROMUSCULAR REEDUCATION: CPT

## 2021-03-08 PROCEDURE — 97140 MANUAL THERAPY 1/> REGIONS: CPT

## 2021-03-08 PROCEDURE — 97014 ELECTRIC STIMULATION THERAPY: CPT

## 2021-03-08 PROCEDURE — 97110 THERAPEUTIC EXERCISES: CPT

## 2021-03-08 NOTE — PROGRESS NOTES
Daily Note     Today's date: 3/8/2021  Patient name: Dilan Downey  : 1960  MRN: [id number]  Referring provider: Maulik Fay MD  Dx:   Encounter Diagnosis     ICD-10-CM    1  Generalized weakness  R53 1    2  Cervical radiculopathy  M54 12    3  Chronic neck pain  M54 2     G89 29    4  Left thigh pain  M79 652    5  Lumbar radiculopathy  M54 16                   Subjective: Patient states his neck has been improving and he denies having a lot of symptoms     Objective: See treatment diary below      Assessment: Tolerated treatment well  Initiated POC on UBE for warmup, no pain noted after  Continued with stretching exercises  Consider weening off manuals and having patient begin strength training as long as symptoms are under control  I Patient demonstrated fatigue post treatment, exhibited good technique with therapeutic exercises and would benefit from continued PT      Plan: Continue per plan of care        Precautions: hx of cervical fusion     HEP: supine chin tucks, CODY, seated HS stretch    Specialty Daily Treatment Diary       Manual 2/26 3/1 3/4 3/8 2/24   STM/SOR to cervical JZ [facility]   TPR B UT, LS JZ [facility]     [facility]; [facility]      STM bilat temporalis        TPR to L piriformis        L Bryan stretch        PA glides lumbar                Exercise Diary         UBE    6'     UT; LS stretch        Pec stretch (unilaterally)        Chin tucks  2 sec x 10 (p!) 2 sec x 10 5 sec x 30  5 sec x 30   Hooklying Hugs to T's  5 sec x 30  5 sec x 30             TB Scap Retraction; TB S' Ext 20/ 3x10  15/ 3x10    GTB 3x10 ea   B ER; B Horiz ABD 3/ 3x10  5/ 3x10    GTB 2x10 ea    Scaption     2x10           Gripper        Diggiflex                Standing Lumbar Extension                Seated HS stretch :30x3   30 sec x 3  30 sec x 3    Seated Thoracic Extension c MB  Half foam: 10x Half foam 10x  15x half foam    Seated 3 way Forward Trunk Flexion      10 sec x 10    Sh abd dumbbell 4# 3x10       Sh flexion dumbbell 4# 3x10       SNAGs c towel Extension; Rotation  10 ea 10 ea  10 ea     Upper Trap stretch; LS stretch                                CODY -> PPU        Glute sets c MH lumbar extension f/b prone quad stretch        Prone H' Ext                TAC; TAC c BKFO        TAC c Marches        TAC c SLR        TAC c OH Reaching                SKTC; DKTC        PB LTR; PB DKTC  5 sec x 20 ea 5 sec x 20  5 sec x 20    2 way Piriformis stretch        Cross Rot stretch c OP        PPT    HEP                     Prone T        Prone I         Sidelying Thoracic Rotation                                Modalities        MH  20 mins to C/S and L/s in hooklying while performing exercises (pre) 10 min in seated while performing exercises,  10 mins to c/s and l/s hooklying pre/MH c/s post 10 min c/s c stretching pre    CP        Estim 10 10 min post   10 min post       Skin checks performed pre and post application: intact

## 2021-03-11 ENCOUNTER — OFFICE VISIT (OUTPATIENT)
Dept: PHYSICAL THERAPY | Facility: CLINIC | Age: 61
End: 2021-03-11
Payer: COMMERCIAL

## 2021-03-11 DIAGNOSIS — M54.12 CERVICAL RADICULOPATHY: ICD-10-CM

## 2021-03-11 DIAGNOSIS — M54.2 CHRONIC NECK PAIN: ICD-10-CM

## 2021-03-11 DIAGNOSIS — M54.16 LUMBAR RADICULOPATHY: ICD-10-CM

## 2021-03-11 DIAGNOSIS — R53.1 GENERALIZED WEAKNESS: Primary | ICD-10-CM

## 2021-03-11 DIAGNOSIS — M79.652 LEFT THIGH PAIN: ICD-10-CM

## 2021-03-11 DIAGNOSIS — G89.29 CHRONIC NECK PAIN: ICD-10-CM

## 2021-03-11 PROCEDURE — 97014 ELECTRIC STIMULATION THERAPY: CPT

## 2021-03-11 PROCEDURE — 97112 NEUROMUSCULAR REEDUCATION: CPT

## 2021-03-11 PROCEDURE — 97110 THERAPEUTIC EXERCISES: CPT

## 2021-03-11 NOTE — PROGRESS NOTES
Daily Note     Today's date: 3/11/2021  Patient name: Sylvester Carney  : 1960  MRN: [id number]  Referring provider: Carl Gallo MD  Dx:   Encounter Diagnosis     ICD-10-CM    1  Generalized weakness  R53 1    2  Cervical radiculopathy  M54 12    3  Chronic neck pain  M54 2     G89 29    4  Left thigh pain  M79 652    5  Lumbar radiculopathy  M54 16                   Subjective: Patient states he is feeling pretty good again today  Objective: See treatment diary below      Assessment: Tolerated treatment well  Initiated POC on UBE for warm up  Progressed patient through exercises this visit  Added in strengthening with theraband with good tolerance and no reports of pain  Introduced sidelying thoracic rotation stretch and noted good stretch  He did require occasional verbal cues for correct technique  Did not perform manuals, would like to see how patient responds without htem  Patient demonstrated fatigue post treatment and would benefit from continued PT      Plan: Continue per plan of care        Precautions: hx of cervical fusion     HEP: supine chin tucks, CODY, seated HS stretch    Specialty Daily Treatment Diary       Manual 2/26 3/1 3/4 3/8 3/11   STM/SOR to cervical JZ [facility] RA RA    TPR B UT, LS JZ [facility] RA     UT; LS stretch [facility]              Exercise Diary         UBE    6'  5'    UT; LS stretch        Pec stretch (unilaterally)        Chin tucks  2 sec x 10 (p!) 2 sec x 10 5 sec x 30  5 sec x 30   Hooklying Hugs to T's  5 sec x 30  5 sec x 30  5 sec x 30            TB Scap Retraction; TB S' Ext 20/ 3x10  15/ 3x10    OTB 10x ea   B ER; B Horiz ABD 3/ 3x10  5/ 3x10    OTB 10x ea    Scaption                        Seated HS stretch :30x3   30 sec x 3  30 sec    Seated Thoracic Extension c MB  Half foam: 10x Half foam 10x  15x half foam 15x half foam    Sh abd dumbbell 4# 3x10       Sh flexion dumbbell 4# 3x10       SNAGs c towel Extension; Rotation  10 ea 10 ea  10 ea  10ea   Upper Trap stretch; LS stretch                SKTC; DKTC        PB LTR; PB DKTC  5 sec x 20 ea 5 sec x 20  5 sec x 20 5 sec x20                   Prone T        Prone I         Sidelying Thoracic Rotation     5 sec x 15 ea                           Modalities        MH  20 mins to C/S and L/s in hooklying while performing exercises (pre) 10 min in seated while performing exercises,  10 mins to c/s and l/s hooklying pre/MH c/s post np    CP        Estim 10 10 min post   10 min post       Skin checks performed pre and post application: intact

## 2021-03-15 ENCOUNTER — APPOINTMENT (OUTPATIENT)
Dept: PHYSICAL THERAPY | Facility: CLINIC | Age: 61
End: 2021-03-15
Payer: COMMERCIAL

## 2021-03-18 ENCOUNTER — OFFICE VISIT (OUTPATIENT)
Dept: PHYSICAL THERAPY | Facility: CLINIC | Age: 61
End: 2021-03-18
Payer: COMMERCIAL

## 2021-03-18 DIAGNOSIS — M79.652 LEFT THIGH PAIN: ICD-10-CM

## 2021-03-18 DIAGNOSIS — M54.16 LUMBAR RADICULOPATHY: ICD-10-CM

## 2021-03-18 DIAGNOSIS — R53.1 GENERALIZED WEAKNESS: Primary | ICD-10-CM

## 2021-03-18 DIAGNOSIS — M54.2 CHRONIC NECK PAIN: ICD-10-CM

## 2021-03-18 DIAGNOSIS — M54.12 CERVICAL RADICULOPATHY: ICD-10-CM

## 2021-03-18 DIAGNOSIS — G89.29 CHRONIC NECK PAIN: ICD-10-CM

## 2021-03-18 PROCEDURE — 97140 MANUAL THERAPY 1/> REGIONS: CPT

## 2021-03-18 PROCEDURE — 97112 NEUROMUSCULAR REEDUCATION: CPT

## 2021-03-18 PROCEDURE — 97110 THERAPEUTIC EXERCISES: CPT

## 2021-03-18 PROCEDURE — 97014 ELECTRIC STIMULATION THERAPY: CPT

## 2021-03-18 NOTE — PROGRESS NOTES
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  Objective: See treatment diary below      Assessment: Tolerated treatment well  Focused today's treatment on ROM and stretching  Introduced cupping this visit with good tolerance, he did have notable pettici  Educated patient on potential bruising, demonstrated understanding  He noted moderate relief from sxs leaving therapy  Patient demonstrated fatigue post treatment and would benefit from continued PT      Plan: Continue per plan of care        Precautions: hx of cervical fusion     HEP: supine chin tucks, CODY, seated HS stretch    Specialty Daily Treatment Diary       Manual 3/18 3/1 3/4 3/8 3/11   STM/SOR to cervical  SMF RA RA    TPR B UT, LS  SMF RA     UT; LS stretch  SMF      Cupping  RA       Exercise Diary         UBE NV   6'  5'    UT; LS stretch        Pec stretch (unilaterally)        Chin tucks 5 sec x 30 2 sec x 10 (p!) 2 sec x 10 5 sec x 30  5 sec x 30   Hooklying Hugs to T's  5 sec x 30  5 sec x 30  5 sec x 30            TB Scap Retraction; TB S' Ext     OTB 10x ea   B ER; B Horiz ABD     OTB 10x ea    Scaption                        Seated HS stretch    30 sec x 3  30 sec    Seated Thoracic Extension c MB 10"x10 ha;f foam Half foam: 10x Half foam 10x  15x half foam 15x half foam    Sh abd dumbbell        Sh flexion dumbbell        SNAGs c towel Extension; Rotation 10 ea  10 ea 10 ea  10 ea  10ea   Upper Trap stretch; LS stretch                SKTC; DKTC        PB LTR; PB DKTC np  5 sec x 20 ea 5 sec x 20  5 sec x 20 5 sec x20                   Prone T        Prone I         Sidelying Thoracic Rotation 5 sec x 15 ea    5 sec x 15 ea                           Modalities        MH 10 mins to cervical /c stretching 20 mins to C/S and L/s in hooklying while performing exercises (pre) 10 min in seated while performing exercises,  10 mins to c/s and l/s hooklying pre/MH c/s post np    CP        Estim  10 min post   10 min post       Skin checks performed pre and post application: intact

## 2021-03-22 ENCOUNTER — OFFICE VISIT (OUTPATIENT)
Dept: PHYSICAL THERAPY | Facility: CLINIC | Age: 61
End: 2021-03-22
Payer: COMMERCIAL

## 2021-03-22 DIAGNOSIS — R53.1 GENERALIZED WEAKNESS: ICD-10-CM

## 2021-03-22 DIAGNOSIS — M54.16 LUMBAR RADICULOPATHY: ICD-10-CM

## 2021-03-22 DIAGNOSIS — G89.29 CHRONIC NECK PAIN: ICD-10-CM

## 2021-03-22 DIAGNOSIS — M54.2 CHRONIC NECK PAIN: ICD-10-CM

## 2021-03-22 DIAGNOSIS — M79.652 LEFT THIGH PAIN: ICD-10-CM

## 2021-03-22 DIAGNOSIS — M54.12 CERVICAL RADICULOPATHY: Primary | ICD-10-CM

## 2021-03-22 PROCEDURE — 97112 NEUROMUSCULAR REEDUCATION: CPT

## 2021-03-22 PROCEDURE — 97140 MANUAL THERAPY 1/> REGIONS: CPT

## 2021-03-22 PROCEDURE — 97014 ELECTRIC STIMULATION THERAPY: CPT

## 2021-03-22 PROCEDURE — 97110 THERAPEUTIC EXERCISES: CPT

## 2021-03-22 NOTE — PROGRESS NOTES
Daily Note     Today's date: 3/22/2021  Patient name: Harshad Valdes  : 1960  MRN: [id number]  Referring provider: Kelly Nunn MD  Dx:   Encounter Diagnosis     ICD-10-CM    1  Cervical radiculopathy  M54 12    2  Chronic neck pain  M54 2     G89 29    3  Left thigh pain  M79 652    4  Generalized weakness  R53 1    5  Lumbar radiculopathy  M54 16                   Subjective: Patient states he had some bruising from cupping last visit, but noted he felt relief with it  Objective: See treatment diary below      Assessment: Tolerated treatment well  Initiated POC with MHP to cervical region while performing stretches  He did present with bruising, continued cupping in areas that were not bruised  He is demonstrating improvements into cervical rotation ROM  Patient demonstrated fatigue post treatment and would benefit from continued PT      Plan: Continue per plan of care        Precautions: hx of cervical fusion     HEP: supine chin tucks, CODY, seated HS stretch    Specialty Daily Treatment Diary       Manual 3/18 3/22 3/4 3/8 3/11   STM/SOR to cervical   RA RA    TPR B UT, LS   RA     UT; LS stretch        Cupping  RA RA      Exercise Diary         UBE NV 5' retro   6'  5'    UT; LS stretch        Pec stretch (unilaterally)        Chin tucks 5 sec x 30 5 sec x 30 2 sec x 10 5 sec x 30  5 sec x 30   Hooklying Hugs to T's    5 sec x 30  5 sec x 30            TB Scap Retraction; TB S' Ext     OTB 10x ea   B ER; B Horiz ABD     OTB 10x ea    Scaption                        Seated HS stretch    30 sec x 3  30 sec    Seated Thoracic Extension c MB 10"x10 ha;f foam 10"x15 half foam Half foam 10x  15x half foam 15x half foam    Sh abd dumbbell        Sh flexion dumbbell        SNAGs c towel Extension; Rotation 10 ea  10 ea  10 ea  10 ea  10ea   Upper Trap stretch; LS stretch                SKTC; DKTC        PB LTR; PB DKTC np   5 sec x 20  5 sec x 20 5 sec x20                   Prone T        Prone I Sidelying Thoracic Rotation 5 sec x 15 ea 5 sec x 15 ea    5 sec x 15 ea                           Modalities        MH 10 mins to cervical /c stretching 10 min to cervical /c strething 10 min in seated while performing exercises,  10 mins to c/s and l/s hooklying pre/MH c/s post np    CP        Estim  10 min post    10 min post       Skin checks performed pre and post application: intact

## 2021-03-25 ENCOUNTER — OFFICE VISIT (OUTPATIENT)
Dept: PHYSICAL THERAPY | Facility: CLINIC | Age: 61
End: 2021-03-25
Payer: COMMERCIAL

## 2021-03-25 DIAGNOSIS — G89.29 CHRONIC NECK PAIN: ICD-10-CM

## 2021-03-25 DIAGNOSIS — M54.16 LUMBAR RADICULOPATHY: ICD-10-CM

## 2021-03-25 DIAGNOSIS — M54.2 CHRONIC NECK PAIN: ICD-10-CM

## 2021-03-25 DIAGNOSIS — M79.652 LEFT THIGH PAIN: ICD-10-CM

## 2021-03-25 DIAGNOSIS — M54.12 CERVICAL RADICULOPATHY: Primary | ICD-10-CM

## 2021-03-25 DIAGNOSIS — R53.1 GENERALIZED WEAKNESS: ICD-10-CM

## 2021-03-25 PROCEDURE — 97140 MANUAL THERAPY 1/> REGIONS: CPT

## 2021-03-25 PROCEDURE — 97112 NEUROMUSCULAR REEDUCATION: CPT

## 2021-03-25 PROCEDURE — 97110 THERAPEUTIC EXERCISES: CPT

## 2021-03-25 NOTE — PROGRESS NOTES
Daily Note     Today's date: 3/25/2021  Patient name: Danyell Garcia  : 1960  MRN: [id number]  Referring provider: Olegario Bartlett MD  Dx:   Encounter Diagnosis     ICD-10-CM    1  Cervical radiculopathy  M54 12    2  Chronic neck pain  M54 2     G89 29    3  Left thigh pain  M79 652    4  Generalized weakness  R53 1    5  Lumbar radiculopathy  M54 16                   Subjective: Patient states he is feeling good today  Objective: See treatment diary below      Assessment: Tolerated treatment well  Initiated POC on UBE for warm up  Patient continues to feel relief from cupping  Continued to progress patient with strength training  Added in standing abduction and flexion with DBs  He requires frequent verbal cueing for correct technique with exercises  Patient demonstrated fatigue post treatment and would benefit from continued PT      Plan: Continue per plan of care        Precautions: hx of cervical fusion     HEP: supine chin tucks, CODY, seated HS stretch    Specialty Daily Treatment Diary       Manual 3/18 3/22 3/25 3/8 3/11   STM/SOR to cervical    RA    TPR B UT, LS        UT; LS stretch        Cupping  RA RA      Exercise Diary         UBE NV 5' retro  5' retro 6'  5'    UT; LS stretch        Pec stretch (unilaterally)        Chin tucks 5 sec x 30 5 sec x 30 5 sec x 30  5 sec x 30  5 sec x 30   Hooklying Hugs to T's    5 sec x 30  5 sec x 30            TB Scap Retraction; TB S' Ext     OTB 10x ea   B ER; B Horiz ABD     OTB 10x ea    Scaption                        Seated HS stretch    30 sec x 3  30 sec    Seated Thoracic Extension c MB 10"x10 ha;f foam 10"x15 half foam 10" x 15 half foam  15x half foam 15x half foam    Sh abd dumbbell   1# 15x     Sh flexion dumbbell   1# 15     SNAGs c towel Extension; Rotation 10 ea  10 ea  10 ea 10 ea  10ea   Upper Trap stretch; LS stretch                SKTC; DKTC        PB LTR; PB DKTC np    5 sec x 20 5 sec x20                   Prone T        Prone I Sidelying Thoracic Rotation 5 sec x 15 ea 5 sec x 15 ea  5 sec x 15  5 sec x 15 ea                           Modalities        MH 10 mins to cervical /c stretching 10 min to cervical /c strething NP  10 mins to c/s and l/s hooklying pre/MH c/s post np    CP        Estim  10 min post  10 min post  10 min post       Skin checks performed pre and post application: intact

## 2021-03-26 DIAGNOSIS — G89.4 CHRONIC PAIN SYNDROME: ICD-10-CM

## 2021-03-29 ENCOUNTER — OFFICE VISIT (OUTPATIENT)
Dept: PHYSICAL THERAPY | Facility: CLINIC | Age: 61
End: 2021-03-29
Payer: COMMERCIAL

## 2021-03-29 DIAGNOSIS — M54.16 LUMBAR RADICULOPATHY: Primary | ICD-10-CM

## 2021-03-29 DIAGNOSIS — R53.1 GENERALIZED WEAKNESS: ICD-10-CM

## 2021-03-29 DIAGNOSIS — M54.2 CHRONIC NECK PAIN: ICD-10-CM

## 2021-03-29 DIAGNOSIS — G89.29 CHRONIC NECK PAIN: ICD-10-CM

## 2021-03-29 DIAGNOSIS — M79.652 LEFT THIGH PAIN: ICD-10-CM

## 2021-03-29 DIAGNOSIS — M54.12 CERVICAL RADICULOPATHY: ICD-10-CM

## 2021-03-29 PROCEDURE — 97112 NEUROMUSCULAR REEDUCATION: CPT

## 2021-03-29 PROCEDURE — 97014 ELECTRIC STIMULATION THERAPY: CPT

## 2021-03-29 PROCEDURE — 97110 THERAPEUTIC EXERCISES: CPT

## 2021-03-29 PROCEDURE — 97140 MANUAL THERAPY 1/> REGIONS: CPT

## 2021-03-29 RX ORDER — TRAMADOL HYDROCHLORIDE 50 MG/1
50 TABLET ORAL 4 TIMES DAILY
Qty: 120 TABLET | Refills: 0 | Status: SHIPPED | OUTPATIENT
Start: 2021-03-29 | End: 2021-04-30

## 2021-03-29 NOTE — PROGRESS NOTES
Daily Note     Today's date: 3/29/2021  Patient name: Jhonatan Orellana  : 1960  MRN: [id number]  Referring provider: Livier Lara MD  Dx:   Encounter Diagnosis     ICD-10-CM    1  Lumbar radiculopathy  M54 16    2  Cervical radiculopathy  M54 12    3  Chronic neck pain  M54 2     G89 29    4  Left thigh pain  M79 652    5  Generalized weakness  R53 1                   Subjective: Patient states he is feeling alright  No new changes  Objective: See treatment diary below      Assessment: Tolerated treatment well  Initiated POC on UBE for warmup  Continued to progress patient through TE  No complaints of pain throughout session  Will continue to progress and add TE into POC  Patient demonstrated fatigue post treatment and would benefit from continued PT      Plan: Continue per plan of care        Precautions: hx of cervical fusion     HEP: supine chin tucks, CODY, seated HS stretch    Specialty Daily Treatment Diary       Manual 3/18 3/22 3/25 3/29 3/11   Cupping  RA RA RA     Exercise Diary         UBE NV 5' retro  5' retro 5' retro 5'    UT; LS stretch        Pec stretch (unilaterally)        Chin tucks 5 sec x 30 5 sec x 30 5 sec x 30  5 sec x 30  5 sec x 30   Hooklying Hugs to T's     5 sec x 30            TB Scap Retraction; TB S' Ext    GTB 2x10  OTB 10x ea   B ER; B Horiz ABD    GTB 2x10 OTB 10x ea    Scaption                        Seated HS stretch     30 sec    Seated Thoracic Extension c MB 10"x10 ha;f foam 10"x15 half foam 10" x 15 half foam  10"x15 half foam 15x half foam    Sh abd dumbbell   1# 15x 1# 20     Sh flexion dumbbell   1# 15 1# 20    SNAGs c towel Extension; Rotation 10 ea  10 ea  10 ea 10x ea 10ea   Upper Trap stretch; LS stretch                SKTC; DKTC        PB LTR; PB DKTC np     5 sec x20                   Prone T        Prone I         Sidelying Thoracic Rotation 5 sec x 15 ea 5 sec x 15 ea  5 sec x 15 At home  5 sec x 15 ea                           Modalities        MH 10 mins to cervical /c stretching 10 min to cervical /c strething NP   np    CP        Estim  10 min post  10 min post 10 min post 10 min post       Skin checks performed pre and post application: intact

## 2021-04-01 ENCOUNTER — OFFICE VISIT (OUTPATIENT)
Dept: PHYSICAL THERAPY | Facility: CLINIC | Age: 61
End: 2021-04-01
Payer: COMMERCIAL

## 2021-04-01 DIAGNOSIS — M54.16 LUMBAR RADICULOPATHY: Primary | ICD-10-CM

## 2021-04-01 DIAGNOSIS — G89.29 CHRONIC NECK PAIN: ICD-10-CM

## 2021-04-01 DIAGNOSIS — M79.652 LEFT THIGH PAIN: ICD-10-CM

## 2021-04-01 DIAGNOSIS — M54.2 CHRONIC NECK PAIN: ICD-10-CM

## 2021-04-01 DIAGNOSIS — M54.12 CERVICAL RADICULOPATHY: ICD-10-CM

## 2021-04-01 DIAGNOSIS — R53.1 GENERALIZED WEAKNESS: ICD-10-CM

## 2021-04-01 PROCEDURE — 97140 MANUAL THERAPY 1/> REGIONS: CPT

## 2021-04-01 PROCEDURE — 97112 NEUROMUSCULAR REEDUCATION: CPT

## 2021-04-01 PROCEDURE — 97110 THERAPEUTIC EXERCISES: CPT
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
## 2021-04-02 ENCOUNTER — TRANSCRIBE ORDERS (OUTPATIENT)
Dept: PHYSICAL THERAPY | Facility: CLINIC | Age: 61
End: 2021-04-02

## 2021-04-02 DIAGNOSIS — M54.16 LUMBAR RADICULOPATHY: Primary | ICD-10-CM

## 2021-04-05 ENCOUNTER — OFFICE VISIT (OUTPATIENT)
Dept: PHYSICAL THERAPY | Facility: CLINIC | Age: 61
End: 2021-04-05
Payer: COMMERCIAL

## 2021-04-05 DIAGNOSIS — M54.12 CERVICAL RADICULOPATHY: Primary | ICD-10-CM

## 2021-04-05 DIAGNOSIS — R53.1 GENERALIZED WEAKNESS: ICD-10-CM

## 2021-04-05 DIAGNOSIS — M54.16 LUMBAR RADICULOPATHY: ICD-10-CM

## 2021-04-05 DIAGNOSIS — G89.29 CHRONIC NECK PAIN: ICD-10-CM

## 2021-04-05 DIAGNOSIS — M79.652 LEFT THIGH PAIN: ICD-10-CM

## 2021-04-05 DIAGNOSIS — M54.2 CHRONIC NECK PAIN: ICD-10-CM

## 2021-04-05 PROCEDURE — 97110 THERAPEUTIC EXERCISES: CPT

## 2021-04-05 PROCEDURE — 97112 NEUROMUSCULAR REEDUCATION: CPT

## 2021-04-05 PROCEDURE — 97140 MANUAL THERAPY 1/> REGIONS: CPT

## 2021-04-05 NOTE — PROGRESS NOTES
Daily Note     Today's date: 2021  Patient name: Kaelyn Mike  : 1960  MRN: [id number]  Referring provider: Malena Silva MD  Dx:   Encounter Diagnosis     ICD-10-CM    1  Cervical radiculopathy  M54 12    2  Lumbar radiculopathy  M54 16    3  Chronic neck pain  M54 2     G89 29    4  Left thigh pain  M79 652    5  Generalized weakness  R53 1                   Subjective: patient states he is doing well today  Objective: See treatment diary below      Assessment: Tolerated treatment well  Progressed patient through TE today  He does require frequent cuing for correct technique with exercises  Introduced wall circles with medicine ball with good tolerance and no complaints of pain  Concluded with e-stim  Patient demonstrated fatigue post treatment and would benefit from continued PT      Plan: Continue per plan of care        Precautions: hx of cervical fusion     HEP: supine chin tucks, CODY, seated HS stretch    Specialty Daily Treatment Diary       Manual 4/5 3/22 3/25 3/29 4/1   Cupping  RA RA RA  RA   Exercise Diary         UBE 5' retro 5' retro  5' retro 5' retro 5'    UT; LS stretch        Pec stretch (unilaterally)        Chin tucks 5 sec x 30 5 sec x 30 5 sec x 30  5 sec x 30  5 sec x 30   Hooklying Hugs to T's     5 sec x 30            TB Scap Retraction; TB S' Ext GTB 3x10   GTB 2x10     B ER; B Horiz ABD GTB 3x10   GTB 2x10    Scaption                Wall circles /c red MB 15x CW,CCW b/l        Seated HS stretch     30 sec    Seated Thoracic Extension c MB 10"x15 half foam 10"x15 half foam 10" x 15 half foam  10"x15 half foam 15x half foam    Sh abd dumbbell 2# 20  1# 15x 1# 20     Sh flexion dumbbell 2# 20  1# 15 1# 20    SNAGs c towel Extension; Rotation 10 ea 10 ea  10 ea 10x ea 10ea   Upper Trap stretch; LS stretch                SKTC; DKTC        PB LTR; PB DKTC     5 sec x20                   Prone T        Prone I         Sidelying Thoracic Rotation  5 sec x 15 ea  5 sec x 15 At home  5 sec x 15 ea                           Modalities        MH  10 min to cervical /c strething NP   np    CP        Estim 10 min post 10 min post  10 min post 10 min post 10 min post       Skin checks performed pre and post application: intact

## 2021-04-08 ENCOUNTER — OFFICE VISIT (OUTPATIENT)
Dept: PHYSICAL THERAPY | Facility: CLINIC | Age: 61
End: 2021-04-08
Payer: COMMERCIAL

## 2021-04-08 DIAGNOSIS — G89.29 CHRONIC NECK PAIN: ICD-10-CM

## 2021-04-08 DIAGNOSIS — M54.12 CERVICAL RADICULOPATHY: Primary | ICD-10-CM

## 2021-04-08 DIAGNOSIS — R53.1 GENERALIZED WEAKNESS: ICD-10-CM

## 2021-04-08 DIAGNOSIS — M54.2 CHRONIC NECK PAIN: ICD-10-CM

## 2021-04-08 DIAGNOSIS — M54.16 LUMBAR RADICULOPATHY: ICD-10-CM

## 2021-04-08 DIAGNOSIS — M79.652 LEFT THIGH PAIN: ICD-10-CM

## 2021-04-08 PROCEDURE — 97112 NEUROMUSCULAR REEDUCATION: CPT

## 2021-04-08 PROCEDURE — 97140 MANUAL THERAPY 1/> REGIONS: CPT

## 2021-04-08 PROCEDURE — 97110 THERAPEUTIC EXERCISES: CPT

## 2021-04-08 PROCEDURE — 97014 ELECTRIC STIMULATION THERAPY: CPT

## 2021-04-12 ENCOUNTER — OFFICE VISIT (OUTPATIENT)
Dept: PHYSICAL THERAPY | Facility: CLINIC | Age: 61
End: 2021-04-12
Payer: COMMERCIAL

## 2021-04-12 DIAGNOSIS — M54.2 CHRONIC NECK PAIN: ICD-10-CM

## 2021-04-12 DIAGNOSIS — G89.29 CHRONIC NECK PAIN: ICD-10-CM

## 2021-04-12 DIAGNOSIS — M79.652 LEFT THIGH PAIN: ICD-10-CM

## 2021-04-12 DIAGNOSIS — M54.16 LUMBAR RADICULOPATHY: ICD-10-CM

## 2021-04-12 DIAGNOSIS — M54.12 CERVICAL RADICULOPATHY: Primary | ICD-10-CM

## 2021-04-12 DIAGNOSIS — R53.1 GENERALIZED WEAKNESS: ICD-10-CM

## 2021-04-12 PROCEDURE — 97014 ELECTRIC STIMULATION THERAPY: CPT

## 2021-04-12 PROCEDURE — 97110 THERAPEUTIC EXERCISES: CPT

## 2021-04-12 PROCEDURE — 97112 NEUROMUSCULAR REEDUCATION: CPT

## 2021-04-15 ENCOUNTER — OFFICE VISIT (OUTPATIENT)
Dept: PHYSICAL THERAPY | Facility: CLINIC | Age: 61
End: 2021-04-15
Payer: COMMERCIAL

## 2021-04-15 DIAGNOSIS — M54.2 CHRONIC NECK PAIN: ICD-10-CM

## 2021-04-15 DIAGNOSIS — R53.1 GENERALIZED WEAKNESS: ICD-10-CM

## 2021-04-15 DIAGNOSIS — M79.652 LEFT THIGH PAIN: ICD-10-CM

## 2021-04-15 DIAGNOSIS — G89.29 CHRONIC NECK PAIN: ICD-10-CM

## 2021-04-15 DIAGNOSIS — M54.16 LUMBAR RADICULOPATHY: ICD-10-CM

## 2021-04-15 DIAGNOSIS — M54.12 CERVICAL RADICULOPATHY: Primary | ICD-10-CM

## 2021-04-15 PROCEDURE — 97014 ELECTRIC STIMULATION THERAPY: CPT

## 2021-04-15 PROCEDURE — 97110 THERAPEUTIC EXERCISES: CPT

## 2021-04-15 PROCEDURE — 97112 NEUROMUSCULAR REEDUCATION: CPT

## 2021-04-15 NOTE — PROGRESS NOTES
Daily Note     Today's date: 4/15/2021  Patient name: Cinthya Baires  : 1960  MRN: [id number]  Referring provider: Faith Patel MD  Dx:   Encounter Diagnosis     ICD-10-CM    1  Cervical radiculopathy  M54 12    2  Lumbar radiculopathy  M54 16    3  Chronic neck pain  M54 2     G89 29    4  Left thigh pain  M79 652    5  Generalized weakness  R53 1                   Subjective: Patient states he experienced soresness from earlier appt this week  Went to weed wack for 45 min while having soreness which he reports made it worse  Reports he does not have soreness arrivng to today  Objective: See treatment diary below      Assessment: Tolerated treatment well  Patient requested to go back down to 2# with standing scapton and abduction  Continued with POC as able with no progressions made per patient request  Explained and educated patient on DOMS and normalcy he was describing in subjective  He did not report of any soreness or pain throughout session  Patient demonstrated fatigue post treatment and would benefit from continued PT      Plan: Continue per plan of care        Precautions: hx of cervical fusion     HEP: supine chin tucks, CODY, seated HS stretch    Specialty Daily Treatment Diary       Manual 4/5 4/8 4/12 4/15 4/1   Cupping  RA    RA   Exercise Diary         UBE 5' retro 5' retro  5' retro 5' retro 5'    UT; LS stretch        Pec stretch (unilaterally)        Chin tucks 5 sec x 30 5 sec x 30 DC to HEP  5 sec x 30   Hooklying Hugs to T's     5 sec x 30            TB Scap Retraction; TB S' Ext GTB 3x10 GTB 3x10 BTB 2x10 BTB 2x10    B ER; B Horiz ABD GTB 3x10 GTB 3x10  BTB 2x10  BTB 2x10    Scaption                Wall circles /c red MB 15x CW,CCW b/l  3x10 CW, CCW b/l  3x10 CW,CCW  b/l 3x10 CW, CCW b/l     Seated HS stretch     30 sec    Seated Thoracic Extension c MB 10"x15 half foam 10"x15 10"x15 10" x 15 15x half foam    Sh abd dumbbell 2# 20 2# 30 3# 20 2# 30    Sh flexion dumbbell 2# 20 2# 30 3# 20 2# 30    SNAGs c towel Extension; Rotation 10 ea 10 ea  10 ea 10 ea  10ea   Upper Trap stretch; LS stretch   DC to HEP             SKTC; DKTC        PB LTR; PB DKTC     5 sec x20                   Prone T        Prone I         Sidelying Thoracic Rotation   5 sec x 15 ea 5 sec x 15 ea 5 sec x 15 ea   Supine Hugs    5 sec x 15                     Modalities        MH     np    CP        Estim 10 min post  10 min post 10 mins post  10 min post       Skin checks performed pre and post application: intact

## 2021-04-20 ENCOUNTER — OFFICE VISIT (OUTPATIENT)
Dept: PHYSICAL THERAPY | Facility: CLINIC | Age: 61
End: 2021-04-20
Payer: COMMERCIAL

## 2021-04-20 DIAGNOSIS — G89.29 CHRONIC NECK PAIN: ICD-10-CM

## 2021-04-20 DIAGNOSIS — R53.1 GENERALIZED WEAKNESS: ICD-10-CM

## 2021-04-20 DIAGNOSIS — M54.2 CHRONIC NECK PAIN: ICD-10-CM

## 2021-04-20 DIAGNOSIS — M54.16 LUMBAR RADICULOPATHY: ICD-10-CM

## 2021-04-20 DIAGNOSIS — M54.12 CERVICAL RADICULOPATHY: Primary | ICD-10-CM

## 2021-04-20 DIAGNOSIS — M79.652 LEFT THIGH PAIN: ICD-10-CM

## 2021-04-20 PROCEDURE — 97014 ELECTRIC STIMULATION THERAPY: CPT

## 2021-04-20 PROCEDURE — 97112 NEUROMUSCULAR REEDUCATION: CPT

## 2021-04-20 PROCEDURE — 97110 THERAPEUTIC EXERCISES: CPT

## 2021-04-20 NOTE — PROGRESS NOTES
Daily Note     Today's date: 2021  Patient name: Maureen Maria  : 1960  MRN: [id number]  Referring provider: Cedric Ervin MD  Dx:   Encounter Diagnosis     ICD-10-CM    1  Cervical radiculopathy  M54 12    2  Lumbar radiculopathy  M54 16    3  Chronic neck pain  M54 2     G89 29    4  Left thigh pain  M79 652    5  Generalized weakness  R53 1                   Subjective: Patient states he is feeling great today  Reports he continues to feel soreness after therapy but believes he is getting stronger from it  Objective: See treatment diary below      Assessment: Tolerated treatment well  Initiated POC on UBE for warm up  Progressed patient in reps with theraband rows and tricep extension  Resumed with POC as able  He is demonstrating improvements with strength and less complaints of pain  Added in prone I, Ys with no complaints of pain  Educated patient on possible DOMS with addition to the new exercises  Patient demonstrated fatigue post treatment, exhibited good technique with therapeutic exercises and would benefit from continued PT      Plan: Continue per plan of care        Precautions: hx of cervical fusion     HEP: supine chin tucks, CODY, seated HS stretch    Specialty Daily Treatment Diary       Manual 4/5 4/8 4/12 4/15 4/20   Cupping  RA       Exercise Diary         UBE 5' retro 5' retro  5' retro 5' retro 5' retro    UT; LS stretch        Pec stretch (unilaterally)        Chin tucks 5 sec x 30 5 sec x 30 DC to HEP     Hooklying Hugs to T's                TB Scap Retraction; TB S' Ext GTB 3x10 GTB 3x10 BTB 2x10 BTB 2x10 BTB 3x10    B ER; B Horiz ABD GTB 3x10 GTB 3x10  BTB 2x10  BTB 2x10 BTB 3x10    Scaption                Wall circles /c red MB 15x CW,CCW b/l  3x10 CW, CCW b/l  3x10 CW,CCW  b/l 3x10 CW, CCW b/l  3x10 CW, CCW b/l    Seated HS stretch        Seated Thoracic Extension c MB 10"x15 half foam 10"x15 10"x15 10" x 15 10" x 15   Sh abd dumbbell 2# 20 2# 30 3# 20 2# 30 2# 30   Sh flexion dumbbell 2# 20 2# 30 3# 20 2# 30 2# 30   SNAGs c towel Extension; Rotation 10 ea 10 ea  10 ea 10 ea  10 ea   Upper Trap stretch; LS stretch   DC to HEP             SKTC; DKTC        PB LTR; PB DKTC                        Prone T     x15 0#   Prone I      x15 0#    Sidelying Thoracic Rotation   5 sec x 15 ea 5 sec x 15 ea 10" x 15 ea   Supine Hugs    5 sec x 15  10 " x 15 ea                    Modalities                CP        Estim 10 min post  10 min post 10 mins post        Skin checks performed pre and post application: intact

## 2021-04-22 ENCOUNTER — APPOINTMENT (OUTPATIENT)
Dept: PHYSICAL THERAPY | Facility: CLINIC | Age: 61
End: 2021-04-22
Payer: COMMERCIAL

## 2021-04-23 ENCOUNTER — APPOINTMENT (OUTPATIENT)
Dept: PHYSICAL THERAPY | Facility: CLINIC | Age: 61
End: 2021-04-23
Payer: COMMERCIAL

## 2021-04-23 ENCOUNTER — IMMUNIZATIONS (OUTPATIENT)
Dept: FAMILY MEDICINE CLINIC | Facility: HOSPITAL | Age: 61
End: 2021-04-23

## 2021-04-23 DIAGNOSIS — Z23 ENCOUNTER FOR IMMUNIZATION: Primary | ICD-10-CM

## 2021-04-23 PROCEDURE — 0001A SARS-COV-2 / COVID-19 MRNA VACCINE (PFIZER-BIONTECH) 30 MCG: CPT

## 2021-04-23 PROCEDURE — 91300 SARS-COV-2 / COVID-19 MRNA VACCINE (PFIZER-BIONTECH) 30 MCG: CPT

## 2021-04-27 ENCOUNTER — OFFICE VISIT (OUTPATIENT)
Dept: PHYSICAL THERAPY | Facility: CLINIC | Age: 61
End: 2021-04-27
Payer: COMMERCIAL

## 2021-04-27 DIAGNOSIS — G89.29 CHRONIC NECK PAIN: ICD-10-CM

## 2021-04-27 DIAGNOSIS — M54.16 LUMBAR RADICULOPATHY: ICD-10-CM

## 2021-04-27 DIAGNOSIS — M54.2 CHRONIC NECK PAIN: ICD-10-CM

## 2021-04-27 DIAGNOSIS — R53.1 GENERALIZED WEAKNESS: ICD-10-CM

## 2021-04-27 DIAGNOSIS — M79.652 LEFT THIGH PAIN: ICD-10-CM

## 2021-04-27 DIAGNOSIS — M54.12 CERVICAL RADICULOPATHY: Primary | ICD-10-CM

## 2021-04-27 PROCEDURE — 97110 THERAPEUTIC EXERCISES: CPT

## 2021-04-27 PROCEDURE — 97112 NEUROMUSCULAR REEDUCATION: CPT

## 2021-04-29 ENCOUNTER — OFFICE VISIT (OUTPATIENT)
Dept: PHYSICAL THERAPY | Facility: CLINIC | Age: 61
End: 2021-04-29
Payer: COMMERCIAL

## 2021-04-29 ENCOUNTER — APPOINTMENT (OUTPATIENT)
Dept: PHYSICAL THERAPY | Facility: CLINIC | Age: 61
End: 2021-04-29
Payer: COMMERCIAL

## 2021-04-29 DIAGNOSIS — M54.16 LUMBAR RADICULOPATHY: ICD-10-CM

## 2021-04-29 DIAGNOSIS — G89.4 CHRONIC PAIN SYNDROME: ICD-10-CM

## 2021-04-29 DIAGNOSIS — R53.1 GENERALIZED WEAKNESS: Primary | ICD-10-CM

## 2021-04-29 DIAGNOSIS — M54.12 CERVICAL RADICULOPATHY: ICD-10-CM

## 2021-04-29 DIAGNOSIS — M79.652 LEFT THIGH PAIN: ICD-10-CM

## 2021-04-29 DIAGNOSIS — G89.29 CHRONIC NECK PAIN: ICD-10-CM

## 2021-04-29 DIAGNOSIS — M54.2 CHRONIC NECK PAIN: ICD-10-CM

## 2021-04-29 PROCEDURE — 97112 NEUROMUSCULAR REEDUCATION: CPT | Performed by: PHYSICAL THERAPIST

## 2021-04-29 PROCEDURE — 97110 THERAPEUTIC EXERCISES: CPT | Performed by: PHYSICAL THERAPIST

## 2021-04-30 RX ORDER — TRAMADOL HYDROCHLORIDE 50 MG/1
50 TABLET ORAL 4 TIMES DAILY
Qty: 120 TABLET | Refills: 0 | Status: SHIPPED | OUTPATIENT
Start: 2021-04-30 | End: 2021-06-01 | Stop reason: SDUPTHER

## 2021-05-03 ENCOUNTER — OFFICE VISIT (OUTPATIENT)
Dept: PHYSICAL THERAPY | Facility: CLINIC | Age: 61
End: 2021-05-03
Payer: COMMERCIAL

## 2021-05-03 DIAGNOSIS — M79.652 LEFT THIGH PAIN: ICD-10-CM

## 2021-05-03 DIAGNOSIS — M54.12 CERVICAL RADICULOPATHY: ICD-10-CM

## 2021-05-03 DIAGNOSIS — M54.16 LUMBAR RADICULOPATHY: ICD-10-CM

## 2021-05-03 DIAGNOSIS — G89.29 CHRONIC NECK PAIN: ICD-10-CM

## 2021-05-03 DIAGNOSIS — M54.2 CHRONIC NECK PAIN: ICD-10-CM

## 2021-05-03 DIAGNOSIS — R53.1 GENERALIZED WEAKNESS: Primary | ICD-10-CM

## 2021-05-03 PROCEDURE — 97112 NEUROMUSCULAR REEDUCATION: CPT

## 2021-05-03 PROCEDURE — 97110 THERAPEUTIC EXERCISES: CPT

## 2021-05-03 NOTE — PROGRESS NOTES
Daily Note     Today's date: 5/3/2021  Patient name: Mirella Tena  : 1960  MRN: [id number]  Referring provider: Zachary Ford MD  Dx:   Encounter Diagnosis     ICD-10-CM    1  Generalized weakness  R53 1    2  Left thigh pain  M79 652    3  Cervical radiculopathy  M54 12    4  Lumbar radiculopathy  M54 16    5  Chronic neck pain  M54 2     G89 29        Start Time: 730  Stop Time: 815  Total time in clinic (min): 45 minutes    Subjective: Presents to his OP PT session with increased c/o L shoulder soreness/shooting pain from last visit with [place] activities, wishes to avoid these today  Otherwise has no other new c/o fatigue, discomfort, or pain  Had a nice weekend  Objective: See treatment diary below      Assessment: Nuno Yousif participated in skilled PT session focused on improving BL scapuloglenohumeral neuromuscular control and strength  Substituted [place] movements with dynamic DB movements per pt's request - was able to tolerate all advancements in intervention intensities with no exacerbations in pain  Introduced quadruped T/S needle threads to challenge scapular WB stability while improving thoracic rotation  Would continue to benefit from skilled PT intervention to address deficits in postural strength and mobility to maximize overall functional mobility and quality of life  Plan: Continue per plan of care  Progress treatment as tolerated         Precautions: hx of cervical fusion     HEP: supine chin tucks, CODY, seated HS stretch    Specialty Daily Treatment Diary       Manual 4/27 4/29 5/3 4/15 4/20   Cupping         Exercise Diary         UBE 5' retro  5' 5' retro 5' retro 5' retro    UT; LS stretch        Pec stretch (unilaterally)        Chin tucks        Hooklying Hugs to T's        [place] row b/l  30/ 3x10 30/3x10     [place] sh ext   15/ 3x10 b/l 15/ 3x10 BL     [place] high row b/l  20/ 3x10      [place] overhead tricep b/l  18/ 3x10      [place] seated horiz abd  1 5/ 3x10ea [place] punch  13/ 3x10ea      [place] seated Y, lower trap  3/ 3x10      Triceps at side  16/ 3x10      TB Scap  BTB 3x10  Prpl TB 3x10 BTB 2x10 BTB 3x10    B ER; B Horiz ABD BTB 3x10   Bl TB 3x10 ea BTB 2x10 BTB 3x10    Scaption                Wall circles /c red MB 3x10 CW, CCW b/l   3x10 CW/CCW B/L 3x10 CW, CCW b/l  3x10 CW, CCW b/l    Seated HS stretch        Seated Thoracic Extension c MB 10" x 15    10" x 15 10" x 15   Sh abd dumbbell 2# 30  3# 2x15 2# 30 2# 30   Sh flexion dumbbell 2# 30  3# 2x15 2# 30 2# 30   Sh scaption dumbbell   3# 2x15     SNAGs c towel Extension; Rotation 10 ea    10 ea  10 ea   Upper Trap stretch; LS stretch                SKTC; DKTC        PB LTR; PB DKTC                        Prone T x15 0#                 x15 0#   Prone I  x15 0#     x15 0#    Sidelying Thoracic Rotation 10"x15 ea  10" x15 ea 5 sec x 15 ea 10" x 15 ea   Supine Hugs 10' x 15 ea   10" x15 ea 5 sec x 15  10 " x 15 ea    Quadruped T/S Needle Thread    3x10 ea             Modalities  4/29      MH        CP        Estim 10 min post   10 mins post        Skin checks performed pre and post application: intact

## 2021-05-07 ENCOUNTER — OFFICE VISIT (OUTPATIENT)
Dept: PHYSICAL THERAPY | Facility: CLINIC | Age: 61
End: 2021-05-07
Payer: COMMERCIAL

## 2021-05-07 DIAGNOSIS — G89.29 CHRONIC NECK PAIN: ICD-10-CM

## 2021-05-07 DIAGNOSIS — M54.12 CERVICAL RADICULOPATHY: ICD-10-CM

## 2021-05-07 DIAGNOSIS — R53.1 GENERALIZED WEAKNESS: Primary | ICD-10-CM

## 2021-05-07 DIAGNOSIS — M79.652 LEFT THIGH PAIN: ICD-10-CM

## 2021-05-07 DIAGNOSIS — M54.2 CHRONIC NECK PAIN: ICD-10-CM

## 2021-05-07 DIAGNOSIS — M54.16 LUMBAR RADICULOPATHY: ICD-10-CM

## 2021-05-07 PROCEDURE — 97110 THERAPEUTIC EXERCISES: CPT

## 2021-05-07 PROCEDURE — 97112 NEUROMUSCULAR REEDUCATION: CPT

## 2021-05-07 NOTE — PROGRESS NOTES
Daily Note     Today's date: 2021  Patient name: Nafisa Santo  : 1960  MRN: [id number]  Referring provider: Ramiro Mesa MD  Dx:   Encounter Diagnosis     ICD-10-CM    1  Generalized weakness  R53 1    2  Left thigh pain  M79 652    3  Cervical radiculopathy  M54 12    4  Lumbar radiculopathy  M54 16    5  Chronic neck pain  M54 2     G89 29                   Subjective: Patient states he is feeling pretty good today  Objective: See treatment diary below      Assessment: Tolerated treatment well  Continued POC with therabands vs weight, patient noting that [place] tends to flare him up because it is too much resistance  No increase in pain was noted this session  Continue to progress as able next visit  Patient demonstrated fatigue post treatment, exhibited good technique with therapeutic exercises and would benefit from continued PT      Plan: Continue per plan of care        Precautions: hx of cervical fusion     HEP: supine chin tucks, CODY, seated HS stretch    Specialty Daily Treatment Diary       Manual 4/27 4/29 5/3 5/7 4/20   Cupping         Exercise Diary         UBE 5' retro  5' 5' retro 5' retro 5' retro    UT; LS stretch        Pec stretch (unilaterally)        Chin tucks        Hooklying Hugs to E  I  [place] row b/l  30/ 3x10 30/3x10     [place] sh ext   15/ 3x10 b/l 15/ 3x10 BL     [place] high row b/l  20/ 3x10      [place] overhead tricep b/l  18/ 3x10      [place] seated horiz abd  1 5/ 3x10ea      [place] punch  13/ 3x10ea      [place] seated Y, lower trap  3/ 3x10      Triceps at side  16/ 3x10      TB Scap  BTB 3x10  Prpl TB 3x10 MTB 3x10 BTB 3x10    B ER; B Horiz ABD BTB 3x10   Bl TB 3x10 ea MTB 3x10 BTB 3x10    Scaption                Wall circles /c red MB 3x10 CW, CCW b/l   3x10 CW/CCW B/L 3x10 CW/CCW b/l 3x10 CW, CCW b/l    Seated HS stretch        Seated Thoracic Extension c MB 10" x 15     10" x 15   Sh abd dumbbell 2# 30  3# 2x15 3# 2x15 2# 30   Sh flexion dumbbell 2# 30  3# 2x15 3# 2x15 2# 30   Sh scaption dumbbell   3# 2x15 3# 2x15    SNAGs c towel Extension; Rotation 10 ea    10 ea  10 ea   Upper Trap stretch; LS stretch                SKTC; DKTC        PB LTR; PB DKTC                        Prone T x15 0#                0# 15x x15 0#   Prone I  x15 0#    0# 15x x15 0#    Sidelying Thoracic Rotation 10"x15 ea  10" x15 ea 10" x 15 ea 10" x 15 ea   Supine Hugs 10' x 15 ea   10" x15 ea 10" x 15 ea 10 " x 15 ea    Quadruped T/S Needle Thread    3x10 ea 3x10             Modalities  4/29      MH        CP        Estim 10 min post          Skin checks performed pre and post application: intact

## 2021-05-11 ENCOUNTER — OFFICE VISIT (OUTPATIENT)
Dept: PHYSICAL THERAPY | Facility: CLINIC | Age: 61
End: 2021-05-11
Payer: COMMERCIAL

## 2021-05-11 DIAGNOSIS — M54.16 LUMBAR RADICULOPATHY: ICD-10-CM

## 2021-05-11 DIAGNOSIS — M54.12 CERVICAL RADICULOPATHY: ICD-10-CM

## 2021-05-11 DIAGNOSIS — M54.2 CHRONIC NECK PAIN: ICD-10-CM

## 2021-05-11 DIAGNOSIS — M79.652 LEFT THIGH PAIN: ICD-10-CM

## 2021-05-11 DIAGNOSIS — G89.29 CHRONIC NECK PAIN: ICD-10-CM

## 2021-05-11 DIAGNOSIS — R53.1 GENERALIZED WEAKNESS: Primary | ICD-10-CM

## 2021-05-11 PROCEDURE — 97110 THERAPEUTIC EXERCISES: CPT

## 2021-05-11 PROCEDURE — 97112 NEUROMUSCULAR REEDUCATION: CPT

## 2021-05-11 NOTE — PROGRESS NOTES
Daily Note     Today's date: 2021  Patient name: Reji Lyle  : 1960  MRN: [id number]  Referring provider: Gianluca Rossi MD  Dx:   Encounter Diagnosis     ICD-10-CM    1  Generalized weakness  R53 1    2  Left thigh pain  M79 652    3  Cervical radiculopathy  M54 12    4  Lumbar radiculopathy  M54 16    5  Chronic neck pain  M54 2     G89 29                   Subjective: Patient states he is doing well today  He did note he is having soreness from fishing yesterday  Objective: See treatment diary below      Assessment: Tolerated treatment well  Initiated POC on UBE for warm up  Progressed patient through TE today with good tolerance  Added in sidelying shoulder ER  Continue to progress and add in TE to POC as able  Patient demonstrated fatigue post treatment, exhibited good technique with therapeutic exercises and would benefit from continued PT      Plan: Continue per plan of care        Precautions: hx of cervical fusion     HEP: supine chin tucks, CODY, seated HS stretch    Specialty Daily Treatment Diary       Manual 4/27 4/29 5/3 5/7 5/11   Cupping         Exercise Diary         UBE 5' retro  5' 5' retro 5' retro 5' retro    UT; LS stretch        Pec stretch (unilaterally)        Chin tucks        Hooklying Hugs to E  I  [place] row b/l  30/ 3x10 30/3x10     [place] sh ext   15/ 3x10 b/l 15/ 3x10 BL     [place] high row b/l  20/ 3x10      [place] overhead tricep b/l  18/ 3x10      [place] seated horiz abd  1 5/ 3x10ea      [place] punch  13/ 3x10ea      [place] seated Y, lower trap  3/ 3x10      Triceps at side  16/ 3x10      TB Scap  BTB 3x10  Prpl TB 3x10 MTB 3x10 MTB 3x10   B ER; B Horiz ABD BTB 3x10   Bl TB 3x10 ea MTB 3x10 MTB 3x10    Scaption                Wall circles /c red MB 3x10 CW, CCW b/l   3x10 CW/CCW B/L 3x10 CW/CCW b/l 3x10 RMB   CW/CCW   Seated HS stretch        Seated Thoracic Extension c MB 10" x 15     10" x 15    Sh abd dumbbell 2# 30  3# 2x15 3# 2x15 3# 2x15   Sh flexion dumbbell 2# 30  3# 2x15 3# 2x15 3# 2x15   Sh scaption dumbbell   3# 2x15 3# 2x15 3# 2x15   SNAGs c towel Extension; Rotation 10 ea    10 ea  10 ea    sidelying Shoulder ER      4# 2x15            SKTC; DKTC        PB LTR; PB DKTC                        Prone T x15 0#                0# 15x 1# 20   Prone I  x15 0#    0# 15x 1# 20    Sidelying Thoracic Rotation 10"x15 ea  10" x15 ea 10" x 15 ea 10"x 15 ea   Supine Hugs 10' x 15 ea   10" x15 ea 10" x 15 ea 10" x 15 ea    Quadruped T/S Needle Thread    3x10 ea 3x10  3x10            Modalities  4/29              CP        Estim 10 min post          Skin checks performed pre and post application: intact

## 2021-05-14 ENCOUNTER — APPOINTMENT (OUTPATIENT)
Dept: PHYSICAL THERAPY | Facility: CLINIC | Age: 61
End: 2021-05-14
Payer: COMMERCIAL

## 2021-05-18 ENCOUNTER — IMMUNIZATIONS (OUTPATIENT)
Dept: FAMILY MEDICINE CLINIC | Facility: HOSPITAL | Age: 61
End: 2021-05-18

## 2021-05-18 DIAGNOSIS — Z23 ENCOUNTER FOR IMMUNIZATION: Primary | ICD-10-CM

## 2021-05-18 PROCEDURE — 91300 SARS-COV-2 / COVID-19 MRNA VACCINE (PFIZER-BIONTECH) 30 MCG: CPT

## 2021-05-18 PROCEDURE — 0002A SARS-COV-2 / COVID-19 MRNA VACCINE (PFIZER-BIONTECH) 30 MCG: CPT

## 2021-05-20 NOTE — PROGRESS NOTES
Daily Note     Today's date: 3/1/2021  Patient name: Ruddy Kurtz  : 1960  MRN: [id number]  Referring provider: Warren Yancey MD  Dx:   Encounter Diagnosis     ICD-10-CM    1  Generalized weakness  R53 1    2  Cervical radiculopathy  M54 12    3  Chronic neck pain  M54 2     G89 29    4  Left thigh pain  M79 652    5  Lumbar radiculopathy  M54 16                   Subjective: Pt reports that he is having more neck and low back pain today  Objective: See treatment diary below      Assessment: Tolerated treatment well; initiated POC with MH to low back and cervical while performing stretching  MT performed after receiving consent from pt; he reports having tenderness with palpation of his B UT and B LS as well as cervical paraspinals  VCs provided on proper sequencing with exercises; exercises focused on increasing tissue flexibility  Patient demonstrated fatigue post treatment and would benefit from continued PT      Plan: Continue per plan of care        Precautions: hx of cervical fusion     HEP: supine chin tucks, CODY, seated HS stretch    Specialty Daily Treatment Diary       Manual 2/26 3/1  2/16 2/24   STM/SOR to cervical [facility]   TPR B UT, LS [facility]   [facility]        TPR to L piriformis        L [place] stretch        BABS aparicio lumbar                Exercise Diary         UBE        UT; LS stretch        Pec stretch (unilaterally)        Chin tucks  2 sec x 10 (p!)  5 sec x 30  5 sec x 30   Hooklying Hugs to T's  5 sec x 30  5 sec x 30             TB Scap Retraction; TB S' Ext 20/ 3x10  15/ 3x10   NV GTB 3x10 ea   B ER; B Horiz ABD 3/ 3x10  5/ 3x10   NV GTB 2x10 ea    Scaption    NV 2x10           Gripper        Diggiflex                Standing Lumbar Extension                Seated HS stretch :30x3   30 sec x 3  30 sec x 3    Seated Thoracic Extension c MB  Half foam: 10x  15x     Seated 3 way Forward Trunk Flexion     10 sec x 10 Faxed patient information to [facility] -- waiting for authorization for breast MRI [id number]. Pending Authorization # [id number]. Insurance company will notify our office by fax.   Request is marked Urgent 10 sec x 10    Sh abd dumbbell 4# 3x10       Sh flexion dumbbell 4# 3x10       SNAGs c towel Extension; Rotation  10 ea      Upper Trap stretch; LS stretch                                CODY -> PPU        Glute sets c MH lumbar extension f/b prone quad stretch        Prone H' Ext                TAC; TAC c BKFO        TAC c Marches        TAC c SLR        TAC c OH Reaching                SKTC; DKTC        PB LTR; PB DKTC  5 sec x 20 ea  5 sec x 20    2 way Piriformis stretch        Cross Rot stretch c OP        PPT    HEP                     Prone T        Prone I         Sidelying Thoracic Rotation                                Modalities        MH  20 mins to C/S and L/s in hooklying while performing exercises (pre)  10 mins to c/s and l/s hooklying pre/MH c/s post 10 min c/s c stretching pre    CP        Estim 10 10 min post  NV 10 min post       Skin checks performed pre and post application: intact

## 2021-06-01 ENCOUNTER — OFFICE VISIT (OUTPATIENT)
Dept: FAMILY MEDICINE CLINIC | Facility: CLINIC | Age: 61
End: 2021-06-01
Payer: COMMERCIAL

## 2021-06-01 VITALS
HEART RATE: 68 BPM | DIASTOLIC BLOOD PRESSURE: 86 MMHG | SYSTOLIC BLOOD PRESSURE: 131 MMHG | HEIGHT: 69 IN | TEMPERATURE: 98.6 F | WEIGHT: 152 LBS | BODY MASS INDEX: 22.51 KG/M2 | OXYGEN SATURATION: 98 %

## 2021-06-01 DIAGNOSIS — N41.0 ACUTE PROSTATITIS: ICD-10-CM

## 2021-06-01 DIAGNOSIS — R97.20 ELEVATED PSA: ICD-10-CM

## 2021-06-01 DIAGNOSIS — R11.0 NAUSEA: ICD-10-CM

## 2021-06-01 DIAGNOSIS — R74.8 ABNORMAL AST AND ALT: ICD-10-CM

## 2021-06-01 DIAGNOSIS — M54.12 CERVICAL NEURITIS: ICD-10-CM

## 2021-06-01 DIAGNOSIS — N13.8 BENIGN PROSTATIC HYPERPLASIA WITH URINARY OBSTRUCTION: ICD-10-CM

## 2021-06-01 DIAGNOSIS — B18.2 CHRONIC HEPATITIS C WITHOUT HEPATIC COMA (HCC): ICD-10-CM

## 2021-06-01 DIAGNOSIS — G89.4 CHRONIC PAIN SYNDROME: ICD-10-CM

## 2021-06-01 DIAGNOSIS — G44.89 OTHER HEADACHE SYNDROME: Primary | ICD-10-CM

## 2021-06-01 DIAGNOSIS — N40.1 BENIGN PROSTATIC HYPERPLASIA WITH URINARY OBSTRUCTION: ICD-10-CM

## 2021-06-01 DIAGNOSIS — Z13.29 SCREENING FOR ENDOCRINE DISORDER: ICD-10-CM

## 2021-06-01 DIAGNOSIS — E78.5 HYPERLIPIDEMIA, UNSPECIFIED HYPERLIPIDEMIA TYPE: ICD-10-CM

## 2021-06-01 DIAGNOSIS — Z13.0 SCREENING FOR DEFICIENCY ANEMIA: ICD-10-CM

## 2021-06-01 PROCEDURE — 1036F TOBACCO NON-USER: CPT | Performed by: FAMILY MEDICINE

## 2021-06-01 PROCEDURE — 3008F BODY MASS INDEX DOCD: CPT | Performed by: FAMILY MEDICINE

## 2021-06-01 PROCEDURE — 99214 OFFICE O/P EST MOD 30 MIN: CPT | Performed by: FAMILY MEDICINE

## 2021-06-01 RX ORDER — ONDANSETRON 4 MG/1
4 TABLET, ORALLY DISINTEGRATING ORAL EVERY 6 HOURS PRN
Qty: 20 TABLET | Refills: 0 | Status: SHIPPED | OUTPATIENT
Start: 2021-06-01 | End: 2021-09-07 | Stop reason: ALTCHOICE

## 2021-06-01 RX ORDER — TRAMADOL HYDROCHLORIDE 50 MG/1
50 TABLET ORAL 4 TIMES DAILY
Qty: 120 TABLET | Refills: 0 | Status: SHIPPED | OUTPATIENT
Start: 2021-06-01 | End: 2021-07-01

## 2021-06-01 NOTE — ASSESSMENT & PLAN NOTE
Likely associated with migraine since it is episodic and comes with other symptoms  Start pepcid at bedtime   Schedule with GI
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
## 2021-06-01 NOTE — PATIENT INSTRUCTIONS
Schedule with GI for evaluation  Start pepcid at bedtime  Avoid spicy and acidic foods, avoid advil/ motrin or aleve, alcohol- does not usually have these foods,meds  If waking up with episodes, take tylenol and ondansetron 1 tab every 8 hours as needed, take in am if having symptoms   Consider migraine medication

## 2021-06-01 NOTE — ASSESSMENT & PLAN NOTE
Likely migrainous, states tylenol works, so will take tylenol with ondansetron at first sign of headache, consider migraine medication

## 2021-06-02 ENCOUNTER — OFFICE VISIT (OUTPATIENT)
Dept: PHYSICAL THERAPY | Facility: CLINIC | Age: 61
End: 2021-06-02
Payer: COMMERCIAL

## 2021-06-02 DIAGNOSIS — M54.12 CERVICAL RADICULOPATHY: ICD-10-CM

## 2021-06-02 DIAGNOSIS — M79.652 LEFT THIGH PAIN: ICD-10-CM

## 2021-06-02 DIAGNOSIS — G89.29 CHRONIC NECK PAIN: Primary | ICD-10-CM

## 2021-06-02 DIAGNOSIS — R53.1 GENERALIZED WEAKNESS: ICD-10-CM

## 2021-06-02 DIAGNOSIS — M54.2 CHRONIC NECK PAIN: Primary | ICD-10-CM

## 2021-06-02 DIAGNOSIS — M54.16 LUMBAR RADICULOPATHY: ICD-10-CM

## 2021-06-02 PROCEDURE — 97110 THERAPEUTIC EXERCISES: CPT | Performed by: PHYSICAL THERAPIST

## 2021-06-02 PROCEDURE — 97112 NEUROMUSCULAR REEDUCATION: CPT | Performed by: PHYSICAL THERAPIST

## 2021-06-02 NOTE — PROGRESS NOTES
Daily Note     Today's date: 2021  Patient name: Mayelin Martinez  : 1960  MRN: [id number]  Referring provider: Koby Rose MD  Dx:   Encounter Diagnosis     ICD-10-CM    1  Chronic neck pain  M54 2     G89 29    2  Generalized weakness  R53 1    3  Left thigh pain  M79 652    4  Cervical radiculopathy  M54 12    5  Lumbar radiculopathy  M54 16                   Subjective: Patient reports he was sick all last week with a prostate issue and felt his neck was a little more sore as a result, but it feels better today overall  Objective: See treatment diary below      Assessment: Tolerated treatment well  Patient would likely benefit from increased challenge with cervical stabilization training, but maintained level due to patient request and return from absence  Patient demonstrated fatigue post treatment, exhibited good technique with therapeutic exercises and would benefit from continued PT      Plan: Continue per plan of care  Progress treatment as tolerated  Progress challenge as appropriate       Precautions: hx of cervical fusion     HEP: supine chin tucks, CODY, seated HS stretch    Specialty Daily Treatment Diary       Manual 6/1 4/29 5/3 5/7 5/11   Cupping         Exercise Diary         UBE 5' retro  5' 5' retro 5' retro 5' retro    UT; LS stretch        Pec stretch (unilaterally)        Chin tucks        Hooklying Hugs to E  I  [place] row b/l  30/ 3x10 30/3x10     [place] sh ext   15/ 3x10 b/l 15/ 3x10 BL     [place] high row b/l  20/ 3x10      [place] overhead tricep b/l  18/ 3x10      [place] seated horiz abd  1 5/ 3x10ea      [place] punch  13/ 3x10ea      [place] seated Y, lower trap  3/ 3x10      Triceps at side  16/ 3x10      TB Scap  MTB 3x10  Prpl TB 3x10 MTB 3x10 MTB 3x10   B ER; B Horiz ABD MTB 3x10   Bl TB 3x10 ea MTB 3x10 MTB 3x10    Scaption                Wall circles /c red MB 3x10 CW, CCW b/l   3x10 CW/CCW B/L 3x10 CW/CCW b/l 3x10 RMB   CW/CCW   Seated HS stretch Seated Thoracic Extension c MB 10" x 15     10" x 15    Sh abd dumbbell 3# 30  3# 2x15 3# 2x15 3# 2x15   Sh flexion dumbbell 3# 30  3# 2x15 3# 2x15 3# 2x15   Sh scaption dumbbell 3# 30  3# 2x15 3# 2x15 3# 2x15   SNAGs c towel Extension; Rotation 10 ea    10 ea  10 ea    sidelying Shoulder ER  4# 2x15    4# 2x15            SKTC; DKTC        PB LTR; PB DKTC                        Prone T x15 1#                0# 15x 1# 20   Prone I  x15 1#    0# 15x 1# 20    Sidelying Thoracic Rotation 10"x15 ea  10" x15 ea 10" x 15 ea 10"x 15 ea   Supine Hugs 10' x 15 ea   10" x15 ea 10" x 15 ea 10" x 15 ea    Quadruped T/S Needle Thread 3x10   3x10 ea 3x10  3x10            Modalities  4/29      MH        CP        Estim 10 min post          Skin checks performed pre and post application: intact

## 2021-06-04 ENCOUNTER — OFFICE VISIT (OUTPATIENT)
Dept: PHYSICAL THERAPY | Facility: CLINIC | Age: 61
End: 2021-06-04
Payer: COMMERCIAL

## 2021-06-04 DIAGNOSIS — R53.1 GENERALIZED WEAKNESS: ICD-10-CM

## 2021-06-04 DIAGNOSIS — M54.16 LUMBAR RADICULOPATHY: ICD-10-CM

## 2021-06-04 DIAGNOSIS — M79.652 LEFT THIGH PAIN: ICD-10-CM

## 2021-06-04 DIAGNOSIS — G89.29 CHRONIC NECK PAIN: Primary | ICD-10-CM

## 2021-06-04 DIAGNOSIS — M54.2 CHRONIC NECK PAIN: Primary | ICD-10-CM

## 2021-06-04 PROCEDURE — 97112 NEUROMUSCULAR REEDUCATION: CPT | Performed by: PHYSICAL THERAPIST

## 2021-06-04 PROCEDURE — 97110 THERAPEUTIC EXERCISES: CPT | Performed by: PHYSICAL THERAPIST

## 2021-06-04 NOTE — PROGRESS NOTES
Daily Note     Today's date: 2021  Patient name: Brook Ramirez  : 1960  MRN: [id number]  Referring provider: Alisha Eller MD  Dx:   Encounter Diagnosis     ICD-10-CM    1  Chronic neck pain  M54 2     G89 29    2  Generalized weakness  R53 1    3  Left thigh pain  M79 652    4  Lumbar radiculopathy  M54 16                   Subjective: Patient reports he feels about the same overall, he had some stress relieved in his personal life and has felt better as a result  Objective: See treatment diary below      Assessment: Tolerated treatment well  Patient continues to improve with mid and low trap strength, posture steadily improving with repeated conditioning exercises  Patient demonstrated fatigue post treatment, exhibited good technique with therapeutic exercises and would benefit from continued PT      Plan: Continue per plan of care  Progress treatment as tolerated  Progress challenge as appropriate       Precautions: hx of cervical fusion     HEP: supine chin tucks, CODY, seated HS stretch    Specialty Daily Treatment Diary       Manual 6/1 6/4 5/3 5/7 5/11   Cupping         Exercise Diary         UBE 5' retro  5' 5' retro 5' retro 5' retro    UT; LS stretch        Pec stretch (unilaterally)        Chin tucks        Hooklying Hugs to E  I  [place] row b/l  30/ 3x10 30/3x10     [place] sh ext    15/ 3x10 BL     [place] high row b/l        [place] overhead tricep b/l        [place] seated horiz abd        [place] punch        [place] seated Y, lower trap        Triceps at side        TB Scap  MTB 3x10 MTB 3x10 Prpl TB 3x10 MTB 3x10 MTB 3x10   B ER; B Horiz ABD MTB 3x10  MTB 3x10 Bl TB 3x10 ea MTB 3x10 MTB 3x10    Scaption                Wall circles /c red MB 3x10 CW, CCW b/l  3x10 CW/CCW B/L 3x10 CW/CCW B/L 3x10 CW/CCW b/l 3x10 RMB   CW/CCW   Seated HS stretch        Seated Thoracic Extension c MB 10" x 15  10x15"   10" x 15    Sh abd dumbbell 3# 30 3# 30 3# 2x15 3# 2x15 3# 2x15   Sh flexion dumbbell 3# 30 3# 30 3# 2x15 3# 2x15 3# 2x15   Sh scaption dumbbell 3# 30 3# 30 3# 2x15 3# 2x15 3# 2x15   SNAGs c towel Extension; Rotation 10 ea  10x ea  10 ea  10 ea    sidelying Shoulder ER  4# 2x15    4# 2x15            SKTC; DKTC        PB LTR; PB DKTC                        Prone T x15 1#              x20 2#  0# 15x 1# 20   Prone I  x15 1#  20x 2#  0# 15x 1# 20    Sidelying Thoracic Rotation 10"x15 ea [id number] 10" x15 ea 10" x 15 ea 10"x 15 ea   Supine Hugs 10' x 15 ea   10" x15 ea 10" x 15 ea 10" x 15 ea    Quadruped T/S Needle Thread 3x10 3x10  3x10 ea 3x10  3x10    [place] S' Press  2x10 MTB      Modalities  4/29      MH        CP        Estim 10 min post          Skin checks performed pre and post application: intact

## 2021-06-08 ENCOUNTER — OFFICE VISIT (OUTPATIENT)
Dept: PHYSICAL THERAPY | Facility: CLINIC | Age: 61
End: 2021-06-08
Payer: COMMERCIAL

## 2021-06-08 DIAGNOSIS — G89.29 CHRONIC NECK PAIN: ICD-10-CM

## 2021-06-08 DIAGNOSIS — M54.16 LUMBAR RADICULOPATHY: ICD-10-CM

## 2021-06-08 DIAGNOSIS — R53.1 GENERALIZED WEAKNESS: ICD-10-CM

## 2021-06-08 DIAGNOSIS — M79.652 LEFT THIGH PAIN: ICD-10-CM

## 2021-06-08 DIAGNOSIS — M54.12 CERVICAL RADICULOPATHY: Primary | ICD-10-CM

## 2021-06-08 DIAGNOSIS — M54.2 CHRONIC NECK PAIN: ICD-10-CM

## 2021-06-08 PROCEDURE — 97110 THERAPEUTIC EXERCISES: CPT | Performed by: PHYSICAL THERAPIST

## 2021-06-08 PROCEDURE — 97112 NEUROMUSCULAR REEDUCATION: CPT | Performed by: PHYSICAL THERAPIST

## 2021-06-08 NOTE — PROGRESS NOTES
Daily Note     Today's date: 2021  Patient name: Suzie John  : 1960  MRN: [id number]  Referring provider: Lynette Baker MD  Dx:   Encounter Diagnosis     ICD-10-CM    1  Cervical radiculopathy  M54 12    2  Chronic neck pain  M54 2     G89 29    3  Generalized weakness  R53 1    4  Left thigh pain  M79 652    5  Lumbar radiculopathy  M54 16                   Subjective: Pt reports that he feels okay today  Objective: See treatment diary below      Assessment: Tolerated treatment well; initiated POC with UBE for 5 minutes retro  VCs provided on proper sequencing with exercises  He denies having increased pain throughout session  Overall, he has improved significantly with strength  Patient demonstrated fatigue post treatment and would benefit from continued PT      Plan: Continue per plan of care   Next visit probable discharge     Precautions: hx of cervical fusion     HEP: supine chin tucks, CODY, seated HS stretch    Specialty Daily Treatment Diary       Manual      Cupping         Exercise Diary         UBE 5' retro  5' 5 mins     UT; LS stretch        Pec stretch (unilaterally)        Chin tucks        Hooklying Hugs to T's        Corbin row b/l  30/ 3x10 30# 3x10     Corbin sh ext    15# 30     Corbin high row b/l        Corbin overhead tricep b/l        Bayard seated horiz abd        Bayard punch        Bayard seated Y, lower trap        Triceps at side        TB Scap  MTB 3x10 MTB 3x10 MTB 3x10     B ER; B Horiz ABD MTB 3x10  MTB 3x10 MTB 3x10 ea     Scaption                Wall circles /c red MB 3x10 CW, CCW b/l  3x10 CW/CCW B/L 3x10 cw,ccw ea     Seated HS stretch        Seated Thoracic Extension c MB 10" x 15  10x15" 10"x15     Sh abd dumbbell 3# 30 3# 30 3# 30     Sh flexion dumbbell 3# 30 3# 30 3# 30     Sh scaption dumbbell 3# 30 3# 30 3# 30     SNAGs c towel Extension; Rotation 10 ea  10x ea 10 ea     sidelying Shoulder ER  4# 2x15  4# 2x15              SKTC; DKTC PB LTR; PB DKTC                        Prone T x15 1#              x20 2# 2# 20     Prone I  x15 1#  20x 2# 2# 20     Sidelying Thoracic Rotation 10"x15 ea [id number] 10"x 20      Supine Hugs 10' x 15 ea   10"x15 ea     Quadruped T/S Needle Thread 3x10 3x10 3x10     Serratus S' Press  2x10 MTB      Modalities  4/29      MH        CP        Estim 10 min post          Skin checks performed pre and post application: intact

## 2021-06-10 ENCOUNTER — APPOINTMENT (OUTPATIENT)
Dept: PHYSICAL THERAPY | Facility: CLINIC | Age: 61
End: 2021-06-10
Payer: COMMERCIAL

## 2021-06-18 ENCOUNTER — OFFICE VISIT (OUTPATIENT)
Dept: PHYSICAL THERAPY | Facility: CLINIC | Age: 61
End: 2021-06-18
Payer: COMMERCIAL

## 2021-06-18 DIAGNOSIS — M54.2 CHRONIC NECK PAIN: ICD-10-CM

## 2021-06-18 DIAGNOSIS — M54.16 LUMBAR RADICULOPATHY: Primary | ICD-10-CM

## 2021-06-18 DIAGNOSIS — M79.652 LEFT THIGH PAIN: ICD-10-CM

## 2021-06-18 DIAGNOSIS — M54.12 CERVICAL RADICULOPATHY: ICD-10-CM

## 2021-06-18 DIAGNOSIS — R53.1 GENERALIZED WEAKNESS: ICD-10-CM

## 2021-06-18 DIAGNOSIS — G89.29 CHRONIC NECK PAIN: ICD-10-CM

## 2021-06-18 PROCEDURE — 97110 THERAPEUTIC EXERCISES: CPT | Performed by: PHYSICAL THERAPIST

## 2021-06-18 PROCEDURE — 97112 NEUROMUSCULAR REEDUCATION: CPT | Performed by: PHYSICAL THERAPIST
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
## 2021-07-01 DIAGNOSIS — G89.4 CHRONIC PAIN SYNDROME: ICD-10-CM

## 2021-07-01 RX ORDER — TRAMADOL HYDROCHLORIDE 50 MG/1
50 TABLET ORAL 4 TIMES DAILY
Qty: 120 TABLET | Refills: 0 | Status: SHIPPED | OUTPATIENT
Start: 2021-07-01 | End: 2021-08-02

## 2021-07-29 LAB
ALBUMIN SERPL-MCNC: 4.3 G/DL (ref 3.6–5.1)
ALBUMIN/GLOB SERPL: 1.5 (CALC) (ref 1–2.5)
ALP SERPL-CCNC: 74 U/L (ref 35–144)
ALT SERPL-CCNC: 7 U/L (ref 9–46)
AST SERPL-CCNC: 19 U/L (ref 10–35)
BASOPHILS # BLD AUTO: 44 CELLS/UL (ref 0–200)
BASOPHILS NFR BLD AUTO: 0.6 %
BILIRUB SERPL-MCNC: 0.7 MG/DL (ref 0.2–1.2)
BUN SERPL-MCNC: 24 MG/DL (ref 7–25)
BUN/CREAT SERPL: ABNORMAL (CALC) (ref 6–22)
CALCIUM SERPL-MCNC: 9.8 MG/DL (ref 8.6–10.3)
CHLORIDE SERPL-SCNC: 105 MMOL/L (ref 98–110)
CHOLEST SERPL-MCNC: 217 MG/DL
CHOLEST/HDLC SERPL: 3.9 (CALC)
CO2 SERPL-SCNC: 30 MMOL/L (ref 20–32)
CREAT SERPL-MCNC: 1 MG/DL (ref 0.7–1.25)
EOSINOPHIL # BLD AUTO: 285 CELLS/UL (ref 15–500)
EOSINOPHIL NFR BLD AUTO: 3.9 %
ERYTHROCYTE [DISTWIDTH] IN BLOOD BY AUTOMATED COUNT: 13.2 % (ref 11–15)
GLOBULIN SER CALC-MCNC: 2.9 G/DL (CALC) (ref 1.9–3.7)
GLUCOSE SERPL-MCNC: 103 MG/DL (ref 65–99)
HCT VFR BLD AUTO: 44.4 % (ref 38.5–50)
HDLC SERPL-MCNC: 55 MG/DL
HGB BLD-MCNC: 13.9 G/DL (ref 13.2–17.1)
LDLC SERPL CALC-MCNC: 144 MG/DL (CALC)
LYMPHOCYTES # BLD AUTO: 2701 CELLS/UL (ref 850–3900)
LYMPHOCYTES NFR BLD AUTO: 37 %
MCH RBC QN AUTO: 26.6 PG (ref 27–33)
MCHC RBC AUTO-ENTMCNC: 31.3 G/DL (ref 32–36)
MCV RBC AUTO: 84.9 FL (ref 80–100)
MONOCYTES # BLD AUTO: 562 CELLS/UL (ref 200–950)
MONOCYTES NFR BLD AUTO: 7.7 %
NEUTROPHILS # BLD AUTO: 3708 CELLS/UL (ref 1500–7800)
NEUTROPHILS NFR BLD AUTO: 50.8 %
NONHDLC SERPL-MCNC: 162 MG/DL (CALC)
PLATELET # BLD AUTO: 261 THOUSAND/UL (ref 140–400)
PMV BLD REES-ECKER: 9.6 FL (ref 7.5–12.5)
POTASSIUM SERPL-SCNC: 4.5 MMOL/L (ref 3.5–5.3)
PROT SERPL-MCNC: 7.2 G/DL (ref 6.1–8.1)
PSA FREE MFR SERPL: 26 % (CALC)
PSA FREE SERPL-MCNC: 0.8 NG/ML
PSA SERPL-MCNC: 3.1 NG/ML
RBC # BLD AUTO: 5.23 MILLION/UL (ref 4.2–5.8)
SL AMB EGFR AFRICAN AMERICAN: 93 ML/MIN/1.73M2
SL AMB EGFR NON AFRICAN AMERICAN: 80 ML/MIN/1.73M2
SODIUM SERPL-SCNC: 141 MMOL/L (ref 135–146)
TRIGL SERPL-MCNC: 80 MG/DL
TSH SERPL-ACNC: 1.97 MIU/L (ref 0.4–4.5)
WBC # BLD AUTO: 7.3 THOUSAND/UL (ref 3.8–10.8)

## 2021-07-30 ENCOUNTER — TELEPHONE (OUTPATIENT)
Dept: FAMILY MEDICINE CLINIC | Facility: CLINIC | Age: 61
End: 2021-07-30

## 2021-07-30 NOTE — TELEPHONE ENCOUNTER
----- Message from Melissa Ramos, DO sent at 7/30/2021  9:38 AM EDT -----  His labs are ok  Cholesterol somewhat higher than last year, getting in medication range  Prostate is still normal value but increased from last year  Will check in 1 year  Other labs are good

## 2021-08-01 DIAGNOSIS — M47.22 OSTEOARTHRITIS OF SPINE WITH RADICULOPATHY, CERVICAL REGION: ICD-10-CM

## 2021-08-01 DIAGNOSIS — G89.4 CHRONIC PAIN SYNDROME: ICD-10-CM

## 2021-08-02 RX ORDER — TRAMADOL HYDROCHLORIDE 50 MG/1
50 TABLET ORAL 4 TIMES DAILY
Qty: 120 TABLET | Refills: 0 | Status: SHIPPED | OUTPATIENT
Start: 2021-08-02 | End: 2021-09-01

## 2021-08-02 RX ORDER — MELOXICAM 15 MG/1
15 TABLET ORAL DAILY
Qty: 30 TABLET | Refills: 5 | Status: SHIPPED | OUTPATIENT
Start: 2021-08-02 | End: 2022-01-07

## 2021-08-23 NOTE — PROGRESS NOTES
Airway  Performed by: Luis Brooks MD  Authorized by: Luis Brooks MD     Final Airway Type:  Supraglottic airway  Final Supraglottic Airway:  Unique  SGA Size*:  2.5  Attempts*:  1  Number of Other Approaches Attempted:  0   Patient Identified, Procedure confirmed, Emergency equipment available and Safety protocols followed  Location:  OR  Urgency:  Elective  Indications for Airway Management:  Anesthesia  Spontaneous Ventilation: present    Sedation Level:  Anesthetized  Mask Difficulty Assessment:  0 - not attempted  Performed By:  Anesthesiologist  Anesthesiologist:  Luis Brooks MD         Daily Note     Today's date: 2021  Patient name: Walter Potts  : 1960  MRN: [id number]  Referring provider: Jaycee Lynn MD  Dx:   Encounter Diagnosis     ICD-10-CM    1  Generalized weakness  R53 1    2  Cervical radiculopathy  M54 12    3  Lumbar radiculopathy  M54 16    4  Chronic neck pain  M54 2     G89 29    5  Left thigh pain  M79 652             Subjective: Pt reported that "In general I am feeling better  PT has been helpful " Noted that he is "having less pain than usual today, which is surprising because I did 3 hours of roto tilling "    Objective: See treatment diary below    Assessment: Tolerated treatment well  Performed corbin horiz abd, row, sh seated Y, shoulder extension, and postural stretches to address postural flexibility and strength, which he performed well without pain  Patient demonstrated fatigue post treatment, exhibited good technique with therapeutic exercises and would benefit from continued PT  Plan: Continue per plan of care        Precautions: hx of cervical fusion     HEP: supine chin tucks, CODY, seated HS stretch    Specialty Daily Treatment Diary       Manual 4/27 4/29  4/15 4/20   Cupping         Exercise Diary         UBE 5' retro  5'  5' retro 5' retro    UT; LS stretch        Pec stretch (unilaterally)        Chin tucks        Hooklying Hugs to E  I  [place] row b/l  30/ 3x10      Corbin sh ext   15/ 3x10 b/l      Corbin high row b/l  20/ 3x10      Corbin overhead tricep b/l  18/ 3x10      [place] seated horiz abd  1 5/ 3x10ea      Corbin punch  13/ 3x10ea      [place] seated Y, lower trap  3/ 3x10      Triceps at side  16/ 3x10      TB Scap  BTB 3x10   BTB 2x10 BTB 3x10    B ER; B Horiz ABD BTB 3x10    BTB 2x10 BTB 3x10    Scaption                Wall circles /c red MB 3x10 CW, CCW b/l    3x10 CW, CCW b/l  3x10 CW, CCW b/l    Seated HS stretch        Seated Thoracic Extension c MB 10" x 15    10" x 15 10" x 15   Sh abd dumbbell 2# 30   2# 30 2# 30 Sh flexion dumbbell 2# 30   2# 30 2# 30   SNAGs c towel Extension; Rotation 10 ea    10 ea  10 ea   Upper Trap stretch; LS stretch                SKTC; DKTC        PB LTR; PB DKTC                        Prone T x15 0#                 x15 0#   Prone I  x15 0#     x15 0#    Sidelying Thoracic Rotation 10"x15 ea   5 sec x 15 ea 10" x 15 ea   Supine Hugs 10' x 15 ea    5 sec x 15  10 " x 15 ea                    Modalities  4/29              CP        Estim 10 min post   10 mins post        Skin checks performed pre and post application: intact

## 2021-09-01 DIAGNOSIS — G89.4 CHRONIC PAIN SYNDROME: ICD-10-CM

## 2021-09-01 RX ORDER — TRAMADOL HYDROCHLORIDE 50 MG/1
50 TABLET ORAL 4 TIMES DAILY
Qty: 120 TABLET | Refills: 0 | Status: SHIPPED | OUTPATIENT
Start: 2021-09-01 | End: 2021-10-04

## 2021-09-07 ENCOUNTER — OFFICE VISIT (OUTPATIENT)
Dept: FAMILY MEDICINE CLINIC | Facility: CLINIC | Age: 61
End: 2021-09-07
Payer: COMMERCIAL

## 2021-09-07 VITALS
HEIGHT: 69 IN | HEART RATE: 69 BPM | TEMPERATURE: 97.5 F | BODY MASS INDEX: 21.62 KG/M2 | WEIGHT: 146 LBS | DIASTOLIC BLOOD PRESSURE: 84 MMHG | OXYGEN SATURATION: 99 % | SYSTOLIC BLOOD PRESSURE: 128 MMHG

## 2021-09-07 DIAGNOSIS — M54.12 CERVICAL NEURITIS: ICD-10-CM
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
## 2021-09-07 DIAGNOSIS — Z79.899 LONG-TERM USE OF HIGH-RISK MEDICATION: ICD-10-CM

## 2021-09-07 DIAGNOSIS — F33.0 MILD EPISODE OF RECURRENT MAJOR DEPRESSIVE DISORDER (HCC): ICD-10-CM

## 2021-09-07 DIAGNOSIS — E78.5 HYPERLIPIDEMIA, UNSPECIFIED HYPERLIPIDEMIA TYPE: ICD-10-CM

## 2021-09-07 PROBLEM — Z13.29 SCREENING FOR ENDOCRINE DISORDER: Status: RESOLVED | Noted: 2021-06-01 | Resolved: 2021-09-07

## 2021-09-07 PROBLEM — Z00.00 MEDICARE ANNUAL WELLNESS VISIT, SUBSEQUENT: Status: RESOLVED | Noted: 2019-10-14 | Resolved: 2021-09-07

## 2021-09-07 PROBLEM — R74.8 ABNORMAL AST AND ALT: Status: RESOLVED | Noted: 2020-06-01 | Resolved: 2021-09-07

## 2021-09-07 PROBLEM — R11.0 NAUSEA: Status: RESOLVED | Noted: 2021-06-01 | Resolved: 2021-09-07

## 2021-09-07 PROBLEM — Z13.0 SCREENING FOR DEFICIENCY ANEMIA: Status: RESOLVED | Noted: 2021-06-01 | Resolved: 2021-09-07

## 2021-09-07 PROCEDURE — 1036F TOBACCO NON-USER: CPT | Performed by: FAMILY MEDICINE

## 2021-09-07 PROCEDURE — 3008F BODY MASS INDEX DOCD: CPT | Performed by: FAMILY MEDICINE

## 2021-09-07 PROCEDURE — 3725F SCREEN DEPRESSION PERFORMED: CPT | Performed by: FAMILY MEDICINE

## 2021-09-07 PROCEDURE — 99214 OFFICE O/P EST MOD 30 MIN: CPT | Performed by: FAMILY MEDICINE

## 2021-09-07 RX ORDER — CYCLOBENZAPRINE HCL 5 MG
5 TABLET ORAL AS NEEDED
COMMUNITY

## 2021-09-07 RX ORDER — ALPRAZOLAM 0.25 MG/1
TABLET ORAL EVERY 12 HOURS
COMMUNITY
End: 2021-09-07 | Stop reason: ALTCHOICE

## 2021-09-07 NOTE — ASSESSMENT & PLAN NOTE
Currently stable, not on medication   Pt reports having to do with relationship with his adult children,

## 2021-09-07 NOTE — ASSESSMENT & PLAN NOTE
Keep follow up with pain management to discuss quadrant injection on left cervical region     Continue with physical therapy  Tramadol as needed   cyclobenzaprine

## 2021-09-07 NOTE — ASSESSMENT & PLAN NOTE
Discussed dietary adjustments  Can repeat the lipid panel in 6 months   Limited in activity due to neck and back pain
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
## 2021-09-07 NOTE — PATIENT INSTRUCTIONS
Schedule follow up with Jania Khanna for trigger point injections - quadrant injections on left  Discuss possibilities of botox     Schedule medicare wellness after 10/22  Continue physical therapy

## 2021-10-04 DIAGNOSIS — G89.4 CHRONIC PAIN SYNDROME: ICD-10-CM

## 2021-10-04 RX ORDER — TRAMADOL HYDROCHLORIDE 50 MG/1
50 TABLET ORAL 4 TIMES DAILY
Qty: 120 TABLET | Refills: 0 | Status: SHIPPED | OUTPATIENT
Start: 2021-10-04 | End: 2021-11-03

## 2021-10-19 ENCOUNTER — RA CDI HCC (OUTPATIENT)
Dept: OTHER | Facility: HOSPITAL | Age: 61
End: 2021-10-19

## 2021-10-20 PROBLEM — F11.20 UNCOMPLICATED OPIOID DEPENDENCE (HCC): Status: ACTIVE | Noted: 2021-10-20

## 2021-10-25 ENCOUNTER — OFFICE VISIT (OUTPATIENT)
Dept: FAMILY MEDICINE CLINIC | Facility: CLINIC | Age: 61
End: 2021-10-25
Payer: COMMERCIAL

## 2021-10-25 VITALS
OXYGEN SATURATION: 98 % | TEMPERATURE: 97.8 F | WEIGHT: 153 LBS | HEIGHT: 69 IN | SYSTOLIC BLOOD PRESSURE: 122 MMHG | BODY MASS INDEX: 22.66 KG/M2 | HEART RATE: 60 BPM | DIASTOLIC BLOOD PRESSURE: 72 MMHG

## 2021-10-25 DIAGNOSIS — E78.5 HYPERLIPIDEMIA, UNSPECIFIED HYPERLIPIDEMIA TYPE: ICD-10-CM

## 2021-10-25 DIAGNOSIS — Z79.899 LONG-TERM USE OF HIGH-RISK MEDICATION: ICD-10-CM

## 2021-10-25 DIAGNOSIS — Z00.00 MEDICARE ANNUAL WELLNESS VISIT, SUBSEQUENT: Primary | ICD-10-CM

## 2021-10-25 PROCEDURE — 3725F SCREEN DEPRESSION PERFORMED: CPT | Performed by: FAMILY MEDICINE

## 2021-10-25 PROCEDURE — 3008F BODY MASS INDEX DOCD: CPT | Performed by: FAMILY MEDICINE

## 2021-10-25 PROCEDURE — G0439 PPPS, SUBSEQ VISIT: HCPCS | Performed by: FAMILY MEDICINE

## 2021-10-25 PROCEDURE — 1036F TOBACCO NON-USER: CPT | Performed by: FAMILY MEDICINE

## 2021-11-02 DIAGNOSIS — G89.4 CHRONIC PAIN SYNDROME: ICD-10-CM

## 2021-11-03 RX ORDER — TRAMADOL HYDROCHLORIDE 50 MG/1
50 TABLET ORAL 4 TIMES DAILY
Qty: 120 TABLET | Refills: 0 | Status: SHIPPED | OUTPATIENT
Start: 2021-11-03 | End: 2021-12-08

## 2021-12-08 DIAGNOSIS — G89.4 CHRONIC PAIN SYNDROME: ICD-10-CM

## 2021-12-08 RX ORDER — TRAMADOL HYDROCHLORIDE 50 MG/1
TABLET ORAL
Qty: 120 TABLET | Refills: 0 | Status: SHIPPED | OUTPATIENT
Start: 2021-12-08 | End: 2022-01-11

## 2022-01-07 DIAGNOSIS — M47.22 OSTEOARTHRITIS OF SPINE WITH RADICULOPATHY, CERVICAL REGION: ICD-10-CM

## 2022-01-07 RX ORDER — MELOXICAM 15 MG/1
15 TABLET ORAL DAILY
Qty: 30 TABLET | Refills: 2 | Status: SHIPPED | OUTPATIENT
Start: 2022-01-07

## 2022-01-11 DIAGNOSIS — G89.4 CHRONIC PAIN SYNDROME: ICD-10-CM

## 2022-01-11 RX ORDER — TRAMADOL HYDROCHLORIDE 50 MG/1
TABLET ORAL
Qty: 120 TABLET | Refills: 0 | Status: SHIPPED | OUTPATIENT
Start: 2022-01-11 | End: 2022-02-12

## 2022-01-19 ENCOUNTER — RA CDI HCC (OUTPATIENT)
Dept: OTHER | Facility: HOSPITAL | Age: 62
End: 2022-01-19

## 2022-01-19 NOTE — PROGRESS NOTES
[facility]  coding opportunities       Chart reviewed, no opportunity found: CHART REVIEWED, NO OPPORTUNITY FOUND                        Patients insurance company: [employer] only)

## 2022-02-12 DIAGNOSIS — G89.4 CHRONIC PAIN SYNDROME: ICD-10-CM

## 2022-02-12 RX ORDER — TRAMADOL HYDROCHLORIDE 50 MG/1
TABLET ORAL
Qty: 120 TABLET | Refills: 0 | Status: SHIPPED | OUTPATIENT
Start: 2022-02-12

## 2022-02-15 ENCOUNTER — OFFICE VISIT (OUTPATIENT)
Dept: FAMILY MEDICINE CLINIC | Facility: CLINIC | Age: 62
End: 2022-02-15
Payer: COMMERCIAL

## 2022-02-15 VITALS
SYSTOLIC BLOOD PRESSURE: 138 MMHG | HEART RATE: 61 BPM | DIASTOLIC BLOOD PRESSURE: 78 MMHG | TEMPERATURE: 97.4 F | HEIGHT: 69 IN | BODY MASS INDEX: 22.59 KG/M2 | WEIGHT: 152.5 LBS | OXYGEN SATURATION: 99 %

## 2022-02-15 DIAGNOSIS — B18.2 CHRONIC HEPATITIS C WITHOUT HEPATIC COMA (HCC): ICD-10-CM

## 2022-02-15 DIAGNOSIS — G89.29 CHRONIC NECK PAIN: Primary | ICD-10-CM

## 2022-02-15 DIAGNOSIS — F41.1 GENERALIZED ANXIETY DISORDER: ICD-10-CM

## 2022-02-15 DIAGNOSIS — Z79.899 LONG-TERM USE OF HIGH-RISK MEDICATION: ICD-10-CM

## 2022-02-15 DIAGNOSIS — Z12.11 COLON CANCER SCREENING: ICD-10-CM

## 2022-02-15 DIAGNOSIS — F33.0 MILD EPISODE OF RECURRENT MAJOR DEPRESSIVE DISORDER (HCC): ICD-10-CM

## 2022-02-15 DIAGNOSIS — M54.12 CERVICAL NEURITIS: ICD-10-CM

## 2022-02-15 DIAGNOSIS — M54.2 CHRONIC NECK PAIN: Primary | ICD-10-CM

## 2022-02-15 PROBLEM — Z00.00 MEDICARE ANNUAL WELLNESS VISIT, SUBSEQUENT: Status: RESOLVED | Noted: 2019-10-14 | Resolved: 2022-02-15

## 2022-02-15 PROCEDURE — 1036F TOBACCO NON-USER: CPT | Performed by: FAMILY MEDICINE

## 2022-02-15 PROCEDURE — 99213 OFFICE O/P EST LOW 20 MIN: CPT | Performed by: FAMILY MEDICINE

## 2022-02-15 PROCEDURE — 3008F BODY MASS INDEX DOCD: CPT | Performed by: FAMILY MEDICINE

## 2022-02-15 RX ORDER — ALPRAZOLAM 0.25 MG/1
TABLET ORAL EVERY 12 HOURS
COMMUNITY
End: 2022-02-15 | Stop reason: ALTCHOICE
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
## 2022-02-15 NOTE — ASSESSMENT & PLAN NOTE
Increased due to family situation but not currently on medication   Does not want to take any additional medications

## 2022-03-28 ENCOUNTER — HOSPITAL ENCOUNTER (OUTPATIENT)
Dept: MRI IMAGING | Facility: HOSPITAL | Age: 62
Discharge: HOME | End: 2022-03-28
Payer: COMMERCIAL

## 2022-03-28 DIAGNOSIS — M54.12 RADICULOPATHY, CERVICAL REGION: ICD-10-CM

## 2022-03-28 DIAGNOSIS — G95.9 DISEASE OF SPINAL CORD, UNSPECIFIED (HCC): ICD-10-CM

## 2022-03-28 DIAGNOSIS — M54.2 CERVICALGIA: ICD-10-CM

## 2022-03-28 PROCEDURE — G1004 CDSM NDSC: HCPCS

## 2022-03-28 PROCEDURE — 72141 MRI NECK SPINE W/O DYE: CPT

## 2022-05-18 DIAGNOSIS — E78.5 HYPERLIPIDEMIA, UNSPECIFIED HYPERLIPIDEMIA TYPE: ICD-10-CM

## 2022-05-18 DIAGNOSIS — Z13.1 SCREENING FOR DIABETES MELLITUS: ICD-10-CM

## 2022-05-18 DIAGNOSIS — Z13.29 SCREENING FOR ENDOCRINE DISORDER: ICD-10-CM

## 2022-05-18 DIAGNOSIS — Z13.220 SCREENING FOR LIPID DISORDERS: ICD-10-CM

## 2022-05-18 DIAGNOSIS — R97.20 ELEVATED PSA: ICD-10-CM

## 2022-05-18 DIAGNOSIS — Z13.0 SCREENING FOR DEFICIENCY ANEMIA: Primary | ICD-10-CM

## 2022-08-18 ENCOUNTER — RA CDI HCC (OUTPATIENT)
Dept: OTHER | Facility: HOSPITAL | Age: 62
End: 2022-08-18

## 2022-08-18 LAB
ALBUMIN SERPL-MCNC: 4.7 G/DL (ref 3.8–4.8)
ALBUMIN/GLOB SERPL: 2 {RATIO} (ref 1.2–2.2)
ALP SERPL-CCNC: 88 IU/L (ref 44–121)
ALT SERPL-CCNC: 11 IU/L (ref 0–44)
AST SERPL-CCNC: 22 IU/L (ref 0–40)
BASOPHILS # BLD AUTO: 0 X10E3/UL (ref 0–0.2)
BASOPHILS NFR BLD AUTO: 0 %
BILIRUB SERPL-MCNC: 0.7 MG/DL (ref 0–1.2)
BUN SERPL-MCNC: 22 MG/DL (ref 8–27)
BUN/CREAT SERPL: 19 (ref 10–24)
CALCIUM SERPL-MCNC: 9.9 MG/DL (ref 8.6–10.2)
CHLORIDE SERPL-SCNC: 102 MMOL/L (ref 96–106)
CHOLEST SERPL-MCNC: 217 MG/DL (ref 100–199)
CHOLEST/HDLC SERPL: 4 RATIO (ref 0–5)
CO2 SERPL-SCNC: 26 MMOL/L (ref 20–29)
CREAT SERPL-MCNC: 1.16 MG/DL (ref 0.76–1.27)
EGFR: 71 ML/MIN/1.73
EOSINOPHIL # BLD AUTO: 0.2 X10E3/UL (ref 0–0.4)
EOSINOPHIL NFR BLD AUTO: 2 %
ERYTHROCYTE [DISTWIDTH] IN BLOOD BY AUTOMATED COUNT: 13.1 % (ref 11.6–15.4)
GLOBULIN SER-MCNC: 2.4 G/DL (ref 1.5–4.5)
GLUCOSE SERPL-MCNC: 102 MG/DL (ref 65–99)
HCT VFR BLD AUTO: 45.9 % (ref 37.5–51)
HDLC SERPL-MCNC: 54 MG/DL
HGB BLD-MCNC: 14.9 G/DL (ref 13–17.7)
IMM GRANULOCYTES # BLD: 0 X10E3/UL (ref 0–0.1)
IMM GRANULOCYTES NFR BLD: 0 %
LDLC SERPL CALC-MCNC: 148 MG/DL (ref 0–99)
LYMPHOCYTES # BLD AUTO: 2.6 X10E3/UL (ref 0.7–3.1)
LYMPHOCYTES NFR BLD AUTO: 27 %
MCH RBC QN AUTO: 27 PG (ref 26.6–33)
MCHC RBC AUTO-ENTMCNC: 32.5 G/DL (ref 31.5–35.7)
MCV RBC AUTO: 83 FL (ref 79–97)
MONOCYTES # BLD AUTO: 0.6 X10E3/UL (ref 0.1–0.9)
MONOCYTES NFR BLD AUTO: 7 %
NEUTROPHILS # BLD AUTO: 6.1 X10E3/UL (ref 1.4–7)
NEUTROPHILS NFR BLD AUTO: 64 %
PLATELET # BLD AUTO: 270 X10E3/UL (ref 150–450)
POTASSIUM SERPL-SCNC: 4.1 MMOL/L (ref 3.5–5.2)
PROT SERPL-MCNC: 7.1 G/DL (ref 6–8.5)
PSA FREE MFR SERPL: 38.5 %
PSA FREE SERPL-MCNC: 1 NG/ML
PSA SERPL-MCNC: 2.6 NG/ML (ref 0–4)
RBC # BLD AUTO: 5.51 X10E6/UL (ref 4.14–5.8)
SL AMB VLDL CHOLESTEROL CALC: 15 MG/DL (ref 5–40)
SODIUM SERPL-SCNC: 141 MMOL/L (ref 134–144)
TRIGL SERPL-MCNC: 87 MG/DL (ref 0–149)
TSH SERPL DL<=0.005 MIU/L-ACNC: 2.97 UIU/ML (ref 0.45–4.5)
WBC # BLD AUTO: 9.6 X10E3/UL (ref 3.4–10.8)

## 2022-08-18 NOTE — PROGRESS NOTES
[facility]  coding opportunities       Chart reviewed, no opportunity found:   [address]        Patients Insurance     Medicare Insurance: [facility]

## 2022-08-30 ENCOUNTER — VBI (OUTPATIENT)
Dept: ADMINISTRATIVE | Facility: OTHER | Age: 62
End: 2022-08-30

## 2022-09-06 ENCOUNTER — OFFICE VISIT (OUTPATIENT)
Dept: FAMILY MEDICINE CLINIC | Facility: CLINIC | Age: 62
End: 2022-09-06
Payer: COMMERCIAL

## 2022-09-06 VITALS
DIASTOLIC BLOOD PRESSURE: 86 MMHG | SYSTOLIC BLOOD PRESSURE: 130 MMHG | HEART RATE: 58 BPM | OXYGEN SATURATION: 98 % | BODY MASS INDEX: 23.08 KG/M2 | HEIGHT: 69 IN | TEMPERATURE: 95.8 F | WEIGHT: 155.8 LBS

## 2022-09-06 DIAGNOSIS — E78.2 MIXED HYPERLIPIDEMIA: Primary | ICD-10-CM
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
## 2022-09-06 PROBLEM — F11.20 UNCOMPLICATED OPIOID DEPENDENCE (HCC): Status: RESOLVED | Noted: 2021-10-20 | Resolved: 2022-09-06

## 2022-09-06 PROBLEM — N41.0 ACUTE PROSTATITIS: Status: RESOLVED | Noted: 2019-10-14 | Resolved: 2022-09-06

## 2022-09-06 PROBLEM — Z79.899 LONG-TERM USE OF HIGH-RISK MEDICATION: Status: RESOLVED | Noted: 2021-09-07 | Resolved: 2022-09-06

## 2022-09-06 PROCEDURE — 99213 OFFICE O/P EST LOW 20 MIN: CPT | Performed by: FAMILY MEDICINE

## 2022-09-06 NOTE — PROGRESS NOTES
Assessment/Plan:      1  Mixed hyperlipidemia  Assessment & Plan:  Pt will adjust diet  Given dietary sheets and recheck in 6 months  Subjective:  Chief Complaint   Patient presents with    Follow-up     Go over BW results,         Patient ID: Ramos Wyatt is a 59 y o  male  Reviewed all blood work with patient  His cholesterol is higher than last year  He states his diet has not been very good  He has been eating eggs daily  He would like to adjust his diet and recheck in 6 months  He denies chest pain or shortness of breath  He is no longer taking tramadol for his neck pain  He takes meloxicam twice a week and tylenol as needed  He will schedule his colonoscopy       Review of Systems   Constitutional: Negative  Negative for fatigue and fever  HENT: Negative  Eyes: Negative  Respiratory: Negative  Negative for cough  Cardiovascular: Negative  Gastrointestinal: Negative  Endocrine: Negative  Genitourinary: Negative  Musculoskeletal: Positive for neck pain and neck stiffness  Skin: Negative  Allergic/Immunologic: Negative  Neurological: Negative  Psychiatric/Behavioral: Negative  The following portions of the patient's history were reviewed and updated as appropriate: allergies, current medications, past family history, past medical history, past social history, past surgical history and problem list     Objective:  Vitals:    09/06/22 0912   BP: 130/86   Pulse: 58   Temp: (!) 95 8 °F (35 4 °C)   SpO2: 98%   Weight: 70 7 kg (155 lb 12 8 oz)   Height: 5' 9" (1 753 m)      Physical Exam  Vitals and nursing note reviewed  Constitutional:       Appearance: He is well-developed  HENT:      Head: Normocephalic and atraumatic  Cardiovascular:      Rate and Rhythm: Normal rate and regular rhythm  Heart sounds: Normal heart sounds  Pulmonary:      Effort: Pulmonary effort is normal       Breath sounds: Normal breath sounds     Abdominal: General: Bowel sounds are normal       Palpations: Abdomen is soft  Skin:     General: Skin is warm and dry  Neurological:      Mental Status: He is alert and oriented to person, place, and time  Psychiatric:         Behavior: Behavior normal          Thought Content:  Thought content normal          Judgment: Judgment normal

## 2022-09-06 NOTE — PATIENT INSTRUCTIONS
Cholesterol and Your Health   AMBULATORY CARE:   Cholesterol  is a waxy, fat-like substance  Your body uses cholesterol to make hormones and new cells, and to protect nerves  Cholesterol is made by your body  It also comes from certain foods you eat, such as meat and dairy products  Your healthcare provider can help you set goals for your cholesterol levels  He or she can help you create a plan to meet your goals  Cholesterol level goals: Your cholesterol level goals depend on your risk for heart disease, your age, and your other health conditions  The following are general guidelines: Total cholesterol  includes low-density lipoprotein (LDL), high-density lipoprotein (HDL), and triglyceride levels  The total cholesterol level should be lower than 200 mg/dL and is best at about 150 mg/dL  LDL cholesterol  is called bad cholesterol  because it forms plaque in your arteries  As plaque builds up, your arteries become narrow, and less blood flows through  When plaque decreases blood flow to your heart, you may have chest pain  If plaque completely blocks an artery that brings blood to your heart, you may have a heart attack  Plaque can break off and form blood clots  Blood clots may block arteries in your brain and cause a stroke  The level should be less than 130 mg/dL and is best at about 100 mg/dL  HDL cholesterol  is called good cholesterol  because it helps remove LDL cholesterol from your arteries  It does this by attaching to LDL cholesterol and carrying it to your liver  Your liver breaks down LDL cholesterol so your body can get rid of it  High levels of HDL cholesterol can help prevent a heart attack and stroke  Low levels of HDL cholesterol can increase your risk for heart disease, heart attack, and stroke  The level should be 60 mg/dL or higher  Triglycerides  are a type of fat that store energy from foods you eat  High levels of triglycerides also cause plaque buildup   This can increase your risk for a heart attack or stroke  If your triglyceride level is high, your LDL cholesterol level may also be high  The level should be less than 150 mg/dL  Any of the following can increase your risk for high cholesterol:   Smoking cigarettes    Being overweight or obese, or not getting enough exercise    Drinking large amounts of alcohol    A medical condition such as hypertension (high blood pressure) or diabetes    Certain genes passed from your parents to you    Age older than 65 years    What you need to know about having your cholesterol levels checked: Adults 21to 39years of age should have their cholesterol levels checked every 4 to 6 years  Adults 45 years or older should have their cholesterol checked every 1 to 2 years  You may need your cholesterol checked more often, or at a younger age, if you have risk factors for heart disease  You may also need to have your cholesterol checked more often if you have other health conditions, such as diabetes  Blood tests are used to check cholesterol levels  Blood tests measure your levels of triglycerides, LDL cholesterol, and HDL cholesterol  How healthy fats affect your cholesterol levels:  Healthy fats, also called unsaturated fats, help lower LDL cholesterol and triglyceride levels  Healthy fats include the following:  Monounsaturated fats  are found in foods such as olive oil, canola oil, avocado, nuts, and olives  Polyunsaturated fats,  such as omega 3 fats, are found in fish, such as salmon, trout, and tuna  They can also be found in plant foods such as flaxseed, walnuts, and soybeans  How unhealthy fats affect your cholesterol levels:  Unhealthy fats increase LDL cholesterol and triglyceride levels  They are found in foods high in cholesterol, saturated fat, and trans fat:  Cholesterol  is found in eggs, dairy, and meat  Saturated fat  is found in butter, cheese, ice cream, whole milk, and coconut oil   Saturated fat is also found in meat, such as sausage, hot dogs, and bologna  Trans fat  is found in liquid oils and is used in fried and baked foods  Foods that contain trans fats include chips, crackers, muffins, sweet rolls, microwave popcorn, and cookies  Treatment  for high cholesterol will also decrease your risk of heart disease, heart attack, and stroke  Treatment may include any of the following:  Lifestyle changes  may include food, exercise, weight loss, and quitting smoking  You may also need to decrease the amount of alcohol you drink  Your healthcare provider will want you to start with lifestyle changes  Other treatment may be added if lifestyle changes are not enough  Your healthcare provider may recommend you work with a team to manage hyperlipidemia  The team may include medical experts such as a dietitian, an exercise or physical therapist, and a behavior therapist  Your family members may be included in helping you create lifestyle changes  Medicines  may be given to lower your LDL cholesterol, triglyceride levels, or total cholesterol level  You may need medicines to lower your cholesterol if any of the following is true:    You have a history of stroke, TIA, unstable angina, or a heart attack  Your LDL cholesterol level is 190 mg/dL or higher  You are age 36 to 76 years, have diabetes or heart disease risk factors, and your LDL cholesterol is 70 mg/dL or higher  Supplements  include fish oil, red yeast rice, and garlic  Fish oil may help lower your triglyceride and LDL cholesterol levels  It may also increase your HDL cholesterol level  Red yeast rice may help decrease your total cholesterol level and LDL cholesterol level  Garlic may help lower your total cholesterol level  Do not take any supplements without talking to your healthcare provider  Food changes you can make to lower your cholesterol levels:  A dietitian can help you create a healthy eating plan   He or she can show you how to read food labels and choose foods low in saturated fat, trans fats, and cholesterol  Decrease the total amount of fat you eat  Choose lean meats, fat-free or 1% fat milk, and low-fat dairy products, such as yogurt and cheese  Try to limit or avoid red meats  Limit or do not eat fried foods or baked goods, such as cookies  Replace unhealthy fats with healthy fats  Cook foods in olive oil or canola oil  Choose soft margarines that are low in saturated fat and trans fat  Seeds, nuts, and avocados are other examples of healthy fats  Eat foods with omega-3 fats  Examples include salmon, tuna, mackerel, walnuts, and flaxseed  Eat fish 2 times per week  Pregnant women should not eat fish that have high levels of mercury, such as shark, swordfish, and emeterio mackerel  Increase the amount of high-fiber foods you eat  High-fiber foods can help lower your LDL cholesterol  Aim to get between 20 and 30 grams of fiber each day  Fruits and vegetables are high in fiber  Eat at least 5 servings each day  Other high-fiber foods are whole-grain or whole-wheat breads, pastas, or cereals, and brown rice  Eat 3 ounces of whole-grain foods each day  Increase fiber slowly  You may have abdominal discomfort, bloating, and gas if you add fiber to your diet too quickly  Eat healthy protein foods  Examples include low-fat dairy products, skinless chicken and turkey, fish, and nuts  Limit foods and drinks that are high in sugar  Your dietitian or healthcare provider can help you create daily limits for high-sugar foods and drinks  The limit may be lower if you have diabetes or another health condition  Limits can also help you lose weight if needed  Lifestyle changes you can make to lower your cholesterol levels:   Maintain a healthy weight  Ask your healthcare provider what a healthy weight is for you  Ask him or her to help you create a weight loss plan if needed   Weight loss can decrease your total cholesterol and triglyceride levels  Weight loss may also help keep your blood pressure at a healthy level  Be physically active throughout the day  Physical activity, such as exercise, can help lower your total cholesterol level and maintain a healthy weight  Physical activity can also help increase your HDL cholesterol level  Work with your healthcare provider to create an program that is right for you  Get at least 30 to 40 minutes of moderate physical activity most days of the week  Examples of exercise include brisk walking, swimming, or biking  Also include strength training at least 2 times each week  Your healthcare providers can help you create a physical activity plan  Do not smoke  Nicotine and other chemicals in cigarettes and cigars can raise your cholesterol levels  Ask your healthcare provider for information if you currently smoke and need help to quit  E-cigarettes or smokeless tobacco still contain nicotine  Talk to your healthcare provider before you use these products  Limit or do not drink alcohol  Alcohol can increase your triglyceride levels  Ask your healthcare provider before you drink alcohol  Ask how much is okay for you to drink in 24 hours or 1 week  Follow up with your doctor as directed:  Write down your questions so you remember to ask them during your visits  © Copyright [employer] 2022 Information is for End User's use only and may not be sold, redistributed or otherwise used for commercial purposes  All illustrations and images included in CareNotes® are the copyrighted property of [employer]  or [facility] [place]aterina Rivas  The above information is an  only  It is not intended as medical advice for individual conditions or treatments  Talk to your doctor, nurse or pharmacist before following any medical regimen to see if it is safe and effective for you

## 2022-10-11 DIAGNOSIS — M47.22 OSTEOARTHRITIS OF SPINE WITH RADICULOPATHY, CERVICAL REGION: ICD-10-CM

## 2022-10-11 RX ORDER — MELOXICAM 15 MG/1
15 TABLET ORAL DAILY
Qty: 30 TABLET | Refills: 2 | Status: SHIPPED | OUTPATIENT
Start: 2022-10-11

## 2023-01-21 DIAGNOSIS — M47.22 OSTEOARTHRITIS OF SPINE WITH RADICULOPATHY, CERVICAL REGION: ICD-10-CM

## 2023-01-21 RX ORDER — MELOXICAM 15 MG/1
15 TABLET ORAL DAILY
Qty: 30 TABLET | Refills: 2 | Status: SHIPPED | OUTPATIENT
Start: 2023-01-21

## 2023-01-31 ENCOUNTER — TELEPHONE (OUTPATIENT)
Dept: GASTROENTEROLOGY | Facility: CLINIC | Age: 63
End: 2023-01-31

## 2023-01-31 ENCOUNTER — PREP FOR PROCEDURE (OUTPATIENT)
Dept: GASTROENTEROLOGY | Facility: CLINIC | Age: 63
End: 2023-01-31

## 2023-01-31 DIAGNOSIS — Z12.11 SCREENING FOR COLON CANCER: Primary | ICD-10-CM

## 2023-01-31 NOTE — TELEPHONE ENCOUNTER
[facility] Assessment    Name: Ayde Cook  YOB: 1960  Last Height: 5' 9" (1 753 m)  Last weight: 70 7 kg (155 lb 12 8 oz)  BMI: 23 01 kg/m²  Procedure: Colon  Diagnosis: screening for colon cancer  Date of procedure: 3/14/2023  Prep:   Responsible : Laurie Puri  Phone#: ? Will Call back with number  Name completing form: Rachel Brownlee  Date form completed: 01/31/23      If the patient answers yes to any of these questions, schedule in a hospital  Are you pregnant: No  Do you rely on a wheelchair for mobility: No  Have you been diagnosed with End Stage Renal Disease (ESRD): No  Do you need oxygen during the day: No  Have you had a heart attack or stroke within the past three months: No  Have you had a seizure within the past three months: No  Have you ever been informed by anesthesia that you have a difficult airway: No  Additional Questions  Have you had any cardiac testing or are under the care of a Cardiologist (see cardiac list): No  Cardiac list:   Do you have an implanted cardiac defibrillator: No (Comment:  This patient should be scheduled in the hospital)    Have any bleeding problems, such as anemia or hemophilia (If patient has H&H result below 8, schedule in hospital   H&H must be within 30 days of procedure): No    Had an organ transplant within the past 3 months: No    Do you have any present infections: No  Do you get short of breath when walking a few blocks: No  Have you been diagnosed with diabetes: No  Comments (provide cardiac provider information if applicable):

## 2023-01-31 NOTE — TELEPHONE ENCOUNTER
Scheduled date of colonoscopy (as of today): 3/14/2023  Physician performing colonoscopy: Dr Jeffry Perry  Location of colonoscopy: [facility]  Clearances: N/A

## 2023-02-07 ENCOUNTER — RA CDI HCC (OUTPATIENT)
Dept: OTHER | Facility: HOSPITAL | Age: 63
End: 2023-02-07

## 2023-02-07 NOTE — PROGRESS NOTES
[facility]  coding opportunities       Chart reviewed, no opportunity found:   [address]        Patients Insurance     Medicare Insurance: [facility]

## 2023-02-08 ENCOUNTER — TELEPHONE (OUTPATIENT)
Dept: GASTROENTEROLOGY | Facility: CLINIC | Age: 63
End: 2023-02-08

## 2023-02-08 NOTE — TELEPHONE ENCOUNTER
Pt called back to scheduled with Dr Sharath Marx at [facility]   I did not see anything available for Dr Sharath Marx

## 2023-02-08 NOTE — TELEPHONE ENCOUNTER
1st call-lvm for pt on cell to call and reschedule colon from [facility] endo to [facility]-pt has [facility] which is not accepted at endo    Appointment at endo has been cancelled

## 2023-02-08 NOTE — TELEPHONE ENCOUNTER
Dr Jeannine Dewitt has to be done at [facility] because of his insurance  Per [employer] is not accepted at [facility]

## 2023-02-08 NOTE — TELEPHONE ENCOUNTER
Dr Analia Al is scheduled for recall colon at [facility] on 3/2 with you  When I called pt to schedule pt is having severe reflux issues  Ok to add egd to scheduled colon?

## 2023-02-09 ENCOUNTER — OFFICE VISIT (OUTPATIENT)
Dept: FAMILY MEDICINE CLINIC | Facility: CLINIC | Age: 63
End: 2023-02-09

## 2023-02-09 VITALS
OXYGEN SATURATION: 99 % | HEIGHT: 68 IN | WEIGHT: 169 LBS | HEART RATE: 62 BPM | BODY MASS INDEX: 25.61 KG/M2 | RESPIRATION RATE: 18 BRPM | DIASTOLIC BLOOD PRESSURE: 82 MMHG | SYSTOLIC BLOOD PRESSURE: 126 MMHG | TEMPERATURE: 96.3 F

## 2023-02-09 DIAGNOSIS — F33.0 MILD EPISODE OF RECURRENT MAJOR DEPRESSIVE DISORDER (HCC): ICD-10-CM

## 2023-02-09 DIAGNOSIS — Z00.00 MEDICARE ANNUAL WELLNESS VISIT, SUBSEQUENT: Primary | ICD-10-CM

## 2023-02-09 DIAGNOSIS — G89.29 CHRONIC NECK PAIN: ICD-10-CM

## 2023-02-09 DIAGNOSIS — G95.9 DISEASE OF SPINAL CORD, UNSPECIFIED (HCC): ICD-10-CM

## 2023-02-09 DIAGNOSIS — N13.8 BENIGN PROSTATIC HYPERPLASIA WITH URINARY OBSTRUCTION: ICD-10-CM

## 2023-02-09 DIAGNOSIS — N40.1 BENIGN PROSTATIC HYPERPLASIA WITH URINARY OBSTRUCTION: ICD-10-CM

## 2023-02-09 DIAGNOSIS — M54.2 CHRONIC NECK PAIN: ICD-10-CM

## 2023-02-09 NOTE — PROGRESS NOTES
Assessment and Plan:     Problem List Items Addressed This Visit        Nervous and Auditory    Disease of spinal cord, unspecified ([facility] [address] )       Genitourinary    Benign prostatic hyperplasia with urinary obstruction     Stable on finasteride and tamsulosin            Other    Chronic neck pain     meloxicam daily with food and cyclobenzaprine as needed         Medicare annual wellness visit, subsequent - Primary    Mild episode of recurrent major depressive disorder ([facility] )     Stable off medication          BMI Counseling: Body mass index is 25 7 kg/m²  The BMI is above normal  Nutrition recommendations include decreasing portion sizes  Exercise recommendations include exercising 3-5 times per week  No pharmacotherapy was ordered  Rationale for BMI follow-up plan is due to patient being overweight or obese  Preventive health issues were discussed with patient, and age appropriate screening tests were ordered as noted in patient's After Visit Summary  Personalized health advice and appropriate referrals for health education or preventive services given if needed, as noted in patient's After Visit Summary  History of Present Illness:     Patient presents for a Medicare Wellness Visit    Pt is seen Complains of right lower leg swelling  Pt denies any injury but has been using a lot of walking to reduce stress  Has a patch of dry skin on the back of his Right arm  Patient Care Team:  Kayy Lopez DO as PCP - General  Kayy Lopez DO     Review of Systems:     Review of Systems   Constitutional: Negative  Negative for fatigue and fever  HENT: Negative  Eyes: Negative  Respiratory: Negative  Negative for cough  Cardiovascular: Negative  Gastrointestinal: Negative  Endocrine: Negative  Genitourinary: Negative  Musculoskeletal: Negative  Skin:        Dry patch on right arm  Allergic/Immunologic: Negative  Neurological: Negative  Psychiatric/Behavioral: Negative  Problem List:     Patient Active Problem List   Diagnosis   • Benign prostatic hyperplasia with urinary obstruction   • Chronic neck pain   • Chronic pain   • Depression   • Esophageal reflux   • Fatigue   • Generalized anxiety disorder   • Chronic hepatitis C without hepatic coma (HCC)   • Mixed hyperlipidemia   • Medicare annual wellness visit, subsequent   • Mild episode of recurrent major depressive disorder ([facility] [address] )   • Lumbar neuritis   • Acute left-sided low back pain with left-sided sciatica   • Cervical neuritis   • Other headache syndrome   • Disease of spinal cord, unspecified (HCC)      Past Medical and Surgical History:     Past Medical History:   Diagnosis Date   • Hiatal hernia    • Long-term use of high-risk medication 9/7/2021   • Lyme disease    • Mononeuritis of upper limb, unspecified laterality    • Neoplasm of uncertain behavior of skin      Past Surgical History:   Procedure Laterality Date   • CERVICAL FUSION      Refusion of Vertebrae   • EPIDURAL BLOCK INJECTION  07/26/2021   • HERNIA REPAIR     • TRIGGER POINT INJECTION  05/2021      Family History:     Family History   Problem Relation Age of Onset   • Diabetes Mother         Mellitus   • No Known Problems Father    • Alcohol abuse Family       Social History:     Social History     Socioeconomic History   • Marital status:      Spouse name: None   • Number of children: None   • Years of education: None   • Highest education level: None   Occupational History   • None   Tobacco Use   • Smoking status: Former   • Smokeless tobacco: Never   Vaping Use   • Vaping Use: Never used   Substance and Sexual Activity   • Alcohol use: No   • Drug use: Yes     Types: Marijuana   • Sexual activity: Not Currently   Other Topics Concern   • None   Social History Narrative   • None     Social Determinants of Health     Financial Resource Strain: Low Risk    • Difficulty of Paying Living Expenses: Not hard at all   Food Insecurity: Not on file Transportation Needs: No Transportation Needs   • Lack of Transportation (Medical): No   • Lack of Transportation (Non-Medical): No   Physical Activity: Not on file   Stress: Not on file   Social Connections: Not on file   Intimate Partner Violence: Not on file   Housing Stability: Not on file      Medications and Allergies:     Current Outpatient Medications   Medication Sig Dispense Refill   • cyclobenzaprine (FLEXERIL) 5 mg tablet Take 5 mg by mouth as needed Pt states he takes a low dosage       • finasteride (PROSCAR) 5 mg tablet Take 5 mg by mouth daily  3   • meloxicam (MOBIC) 15 mg tablet TAKE 1 TABLET (15 MG TOTAL) BY MOUTH DAILY WITH FOOD 30 tablet 2   • tamsulosin (FLOMAX) 0 4 mg 0 4 mg daily        No current facility-administered medications for this visit  Allergies   Allergen Reactions   • Penicillins Anaphylaxis     Category: Allergy;    • Citalopram      Other reaction(s): mental status changes  Category: Adverse Reaction;    • Sulfa Antibiotics Itching     Category: Allergy;    • Sulfamethoxazole-Trimethoprim Rash      Immunizations:     Immunization History   Administered Date(s) Administered   • COVID-19 PFIZER VACCINE 0 3 ML IM 04/23/2021, 05/18/2021   • INFLUENZA 11/01/2018, 09/14/2019, 09/03/2021, 09/15/2022   • Influenza Injectable, MDCK, Preservative Free, Quadrivalent, 0 5 mL 08/30/2019   • Influenza, injectable, quadrivalent, preservative free 0 5 mL 10/04/2018, 09/14/2020   • Tdap 08/05/2013      Health Maintenance:         Topic Date Due   • HIV Screening  Never done   • Colorectal Cancer Screening  06/05/2022   • Hepatitis C Screening  Discontinued         Topic Date Due   • COVID-19 Vaccine (3 - Booster for Ledezma Peter series) 07/13/2021      Medicare Screening Tests and Risk Assessments:     Liz Dickinson is here for his Subsequent Wellness visit  Last Medicare Wellness visit information reviewed, patient interviewed and updates made to the record today        Health Risk Assessment:   Patient rates overall health as good  Patient feels that their physical health rating is same  Patient is satisfied with their life  Eyesight was rated as same  Hearing was rated as same  Patient feels that their emotional and mental health rating is same  Patients states they are never, rarely angry  Patient states they are never, rarely unusually tired/fatigued  Pain experienced in the last 7 days has been some  Patient's pain rating has been 3/10  Patient states that he has experienced no weight loss or gain in last 6 months  Depression Screening:   PHQ-9 Score: 1      Fall Risk Screening: In the past year, patient has experienced: no history of falling in past year      Home Safety:  Patient does not have trouble with stairs inside or outside of their home  Patient has working smoke alarms and has no working carbon monoxide detector  Home safety hazards include: none  Nutrition:   Current diet is Regular  Medications:   Patient is currently taking over-the-counter supplements  OTC medications include: see medication list  Patient is able to manage medications  Activities of Daily Living (ADLs)/Instrumental Activities of Daily Living (IADLs):   Walk and transfer into and out of bed and chair?: Yes  Dress and groom yourself?: Yes    Bathe or shower yourself?: Yes    Feed yourself?  Yes  Do your laundry/housekeeping?: Yes  Manage your money, pay your bills and track your expenses?: Yes  Make your own meals?: Yes    Do your own shopping?: Yes    Previous Hospitalizations:   Any hospitalizations or ED visits within the last 12 months?: No      PREVENTIVE SCREENINGS      Cardiovascular Screening:    General: Screening Not Indicated and History Lipid Disorder      Diabetes Screening:     General: Screening Current      Prostate Cancer Screening:    General: Screening Current      Abdominal Aortic Aneurysm (AAA) Screening:    Risk factors include: tobacco use        Lung Cancer Screening:     General: Screening Not Indicated      Hepatitis C Screening:    General: Screening Not Indicated and History Hepatitis C    Screening, Brief Intervention, and Referral to Treatment (SBIRT)    Screening  Typical number of drinks in a day: 0  Typical number of drinks in a week: 0  Interpretation: Low risk drinking behavior  Single Item Drug Screening:  How often have you used an illegal drug (including marijuana) or a prescription medication for non-medical reasons in the past year? daily or almost daily    Single Item Drug Screen Score: 4  Interpretation: POSITIVE screen for possible drug use disorder    Drug Abuse Screening Test (DAST-10):  1) Have you used drugs other than those required for medical reasons? Yes  2) Do you abuse more than one drug at a time? No  3) Are you always able to stop using drugs when you want to? Yes  4) Have you had "blackouts" or "flashbacks" as a result of drug use? No  5) Do you ever feel bad or guilty about your drug use? No  6) Does your spouse (or parents) ever complain about your involvement with drugs? No  7) Have you neglected your family because of your use of drugs? No  8) Have you engaged in illegal activities in order to obtain drugs? No  9) Have you ever experienced withdrawal symptoms (felt sick) when you stopped taking drugs? No  10) Have you had medical problems as a result of your drug use (e g , memory loss, hepatitis, convulsions, bleeding, etc )? No    DAST-10 Score: 1  Interpretation: Low level problems related to drug abuse    No results found  Physical Exam:     /82 (BP Location: Left arm, Patient Position: Sitting, Cuff Size: Adult)   Pulse 62   Temp (!) 96 3 °F (35 7 °C) (Tympanic)   Resp 18   Ht 5' 8" (1 727 m)   Wt 76 7 kg (169 lb)   SpO2 99%   BMI 25 70 kg/m²     Physical Exam  Vitals and nursing note reviewed  Constitutional:       Appearance: He is well-developed  HENT:      Head: Normocephalic        Right Ear: External ear normal       Left Ear: External ear normal       Nose: Nose normal    Eyes:      Conjunctiva/sclera: Conjunctivae normal       Pupils: Pupils are equal, round, and reactive to light  Cardiovascular:      Rate and Rhythm: Normal rate and regular rhythm  Pulmonary:      Effort: Pulmonary effort is normal       Breath sounds: Normal breath sounds  Abdominal:      General: Bowel sounds are normal       Palpations: Abdomen is soft  Musculoskeletal:         General: Normal range of motion  Cervical back: Normal range of motion and neck supple  Skin:     General: Skin is warm and dry  Comments: Dry patch on right arm 1 0cm upper arm posteriorly    Neurological:      Mental Status: He is alert and oriented to person, place, and time  Psychiatric:         Behavior: Behavior normal          Thought Content:  Thought content normal          Judgment: Judgment normal           Antonio Kinds, DO

## 2023-02-09 NOTE — PATIENT INSTRUCTIONS
Blood work- august  Cortisone apply small amount at bedtime- to right arm  Medicare Preventive Visit Patient Instructions  Thank you for completing your Welcome to Medicare Visit or Medicare Annual Wellness Visit today  Your next wellness visit will be due in one year (2/10/2024)  The screening/preventive services that you may require over the next 5-10 years are detailed below  Some tests may not apply to you based off risk factors and/or age  Screening tests ordered at today's visit but not completed yet may show as past due  Also, please note that scanned in results may not display below  Preventive Screenings:  Service Recommendations Previous Testing/Comments   Colorectal Cancer Screening  Colonoscopy    Fecal Occult Blood Test (FOBT)/Fecal Immunochemical Test (FIT)  Fecal DNA/Cologuard Test  Flexible Sigmoidoscopy Age: 39-70 years old   Colonoscopy: every 10 years (May be performed more frequently if at higher risk)  OR  FOBT/FIT: every 1 year  OR  Cologuard: every 3 years  OR  Sigmoidoscopy: every 5 years  Screening may be recommended earlier than age 39 if at higher risk for colorectal cancer  Also, an individualized decision between you and your healthcare provider will decide whether screening between the ages of 74-80 would be appropriate   Colonoscopy: 06/05/2012  FOBT/FIT: Not on file  Cologuard: Not on file  Sigmoidoscopy: Not on file          Prostate Cancer Screening Individualized decision between patient and health care provider in men between ages of 53-78   Medicare will cover every 12 months beginning on the day after your 50th birthday PSA: 2 6 ng/mL           Hepatitis C Screening Once for adults born between 1945 and 1965  More frequently in patients at high risk for Hepatitis C Hep C Antibody: Not on file        Diabetes Screening 1-2 times per year if you're at risk for diabetes or have pre-diabetes Fasting glucose: No results in last 5 years (No results in last 5 years)  A1C: No results in last 5 years (No results in last 5 years)      Cholesterol Screening Once every 5 years if you don't have a lipid disorder  May order more often based on risk factors  Lipid panel: 08/18/2022         Other Preventive Screenings Covered by Medicare:  Abdominal Aortic Aneurysm (AAA) Screening: covered once if your at risk  You're considered to be at risk if you have a family history of AAA or a male between the age of 73-68 who smoking at least 100 cigarettes in your lifetime  Lung Cancer Screening: covers low dose CT scan once per year if you meet all of the following conditions: (1) Age 50-69; (2) No signs or symptoms of lung cancer; (3) Current smoker or have quit smoking within the last 15 years; (4) You have a tobacco smoking history of at least 20 pack years (packs per day x number of years you smoked); (5) You get a written order from a healthcare provider  Glaucoma Screening: covered annually if you're considered high risk: (1) You have diabetes OR (2) Family history of glaucoma OR (3)  aged 48 and older OR (3)  American aged 72 and older  Osteoporosis Screening: covered every 2 years if you meet one of the following conditions: (1) Have a vertebral abnormality; (2) On glucocorticoid therapy for more than 3 months; (3) Have primary hyperparathyroidism; (4) On osteoporosis medications and need to assess response to drug therapy  HIV Screening: covered annually if you're between the age of 12-76  Also covered annually if you are younger than 13 and older than 72 with risk factors for HIV infection  For pregnant patients, it is covered up to 3 times per pregnancy      Immunizations:  Immunization Recommendations   Influenza Vaccine Annual influenza vaccination during flu season is recommended for all persons aged >= 6 months who do not have contraindications   Pneumococcal Vaccine   * Pneumococcal conjugate vaccine = PCV13 (Prevnar 13), PCV15 (Vaxneuvance), PCV20 (Prevnar 20)  * Pneumococcal polysaccharide vaccine = PPSV23 (Pneumovax) Adults 2364 years old: 1-3 doses may be recommended based on certain risk factors  Adults 72 years old: 1-2 doses may be recommended based off what pneumonia vaccine you previously received   Hepatitis B Vaccine 3 dose series if at intermediate or high risk (ex: diabetes, end stage renal disease, liver disease)   Tetanus (Td) Vaccine - COST NOT COVERED BY MEDICARE PART B Following completion of primary series, a booster dose should be given every 10 years to maintain immunity against tetanus  Td may also be given as tetanus wound prophylaxis  Tdap Vaccine - COST NOT COVERED BY MEDICARE PART B Recommended at least once for all adults  For pregnant patients, recommended with each pregnancy  Shingles Vaccine (Shingrix) - COST NOT COVERED BY MEDICARE PART B  2 shot series recommended in those aged 48 and above     Health Maintenance Due:      Topic Date Due    HIV Screening  Never done    Colorectal Cancer Screening  06/05/2022    Hepatitis C Screening  Discontinued     Immunizations Due:      Topic Date Due    COVID-19 Vaccine (3 - Booster for Ledezma Peter series) 07/13/2021     Advance Directives   What are advance directives? Advance directives are legal documents that state your wishes and plans for medical care  These plans are made ahead of time in case you lose your ability to make decisions for yourself  Advance directives can apply to any medical decision, such as the treatments you want, and if you want to donate organs  What are the types of advance directives? There are many types of advance directives, and each state has rules about how to use them  You may choose a combination of any of the following:  Living will: This is a written record of the treatment you want  You can also choose which treatments you do not want, which to limit, and which to stop at a certain time  This includes surgery, medicine, IV fluid, and tube feedings     Durable power of  for healthcare [facility]): This is a written record that states who you want to make healthcare choices for you when you are unable to make them for yourself  This person, called a proxy, is usually a family member or a friend  You may choose more than 1 proxy  Do not resuscitate (DNR) order:  A DNR order is used in case your heart stops beating or you stop breathing  It is a request not to have certain forms of treatment, such as CPR  A DNR order may be included in other types of advance directives  Medical directive: This covers the care that you want if you are in a coma, near death, or unable to make decisions for yourself  You can list the treatments you want for each condition  Treatment may include pain medicine, surgery, blood transfusions, dialysis, IV or tube feedings, and a ventilator (breathing machine)  Values history: This document has questions about your views, beliefs, and how you feel and think about life  This information can help others choose the care that you would choose  Why are advance directives important? An advance directive helps you control your care  Although spoken wishes may be used, it is better to have your wishes written down  Spoken wishes can be misunderstood, or not followed  Treatments may be given even if you do not want them  An advance directive may make it easier for your family to make difficult choices about your care  © Copyright [employer] 2018 Information is for End User's use only and may not be sold, redistributed or otherwise used for commercial purposes   All illustrations and images included in CareNotes® are the copyrighted property of [employer]  or [address] [place], [place] [address]

## 2023-02-17 ENCOUNTER — TELEPHONE (OUTPATIENT)
Dept: GASTROENTEROLOGY | Facility: CLINIC | Age: 63
End: 2023-02-17

## 2023-02-17 DIAGNOSIS — Z12.11 SCREENING FOR COLON CANCER: Primary | ICD-10-CM

## 2023-02-17 NOTE — TELEPHONE ENCOUNTER
Confirmed  Colonoscopy/Endoscopy     Via: Fax to PCP     Prep Given: Golytely    Call the office if there are any questions        Rx sent

## 2023-02-23 ENCOUNTER — TELEPHONE (OUTPATIENT)
Dept: GASTROENTEROLOGY | Facility: CLINIC | Age: 63
End: 2023-02-23

## 2023-03-30 ENCOUNTER — CLINICAL SUPPORT (OUTPATIENT)
Dept: FAMILY MEDICINE CLINIC | Facility: CLINIC | Age: 63
End: 2023-03-30

## 2023-03-30 DIAGNOSIS — N40.1 BENIGN PROSTATIC HYPERPLASIA WITH URINARY OBSTRUCTION: ICD-10-CM

## 2023-03-30 DIAGNOSIS — R53.83 FATIGUE, UNSPECIFIED TYPE: ICD-10-CM

## 2023-03-30 DIAGNOSIS — E78.00 ELEVATED CHOLESTEROL: ICD-10-CM

## 2023-03-30 DIAGNOSIS — E78.2 MIXED HYPERLIPIDEMIA: Primary | ICD-10-CM

## 2023-03-30 DIAGNOSIS — R97.20 ELEVATED PSA: ICD-10-CM

## 2023-03-30 DIAGNOSIS — R73.9 ELEVATED BLOOD SUGAR: ICD-10-CM

## 2023-03-30 DIAGNOSIS — R11.0 NAUSEA: ICD-10-CM

## 2023-03-30 DIAGNOSIS — N13.8 BENIGN PROSTATIC HYPERPLASIA WITH URINARY OBSTRUCTION: ICD-10-CM

## 2023-03-30 NOTE — PROGRESS NOTES
BW completed  When complete, Pt wants to send results PSA, cholesterol and CMP to Dr Adonay Groves   BW requested by Dr Tara Smith prostate for upcoming appt needed for this month

## 2023-03-31 LAB
ALBUMIN SERPL-MCNC: 4.4 G/DL (ref 3.8–4.8)
ALBUMIN/GLOB SERPL: 1.8 {RATIO} (ref 1.2–2.2)
ALP SERPL-CCNC: 79 IU/L (ref 44–121)
ALT SERPL-CCNC: 10 IU/L (ref 0–44)
AST SERPL-CCNC: 23 IU/L (ref 0–40)
BILIRUB SERPL-MCNC: 0.4 MG/DL (ref 0–1.2)
BUN SERPL-MCNC: 23 MG/DL (ref 8–27)
BUN/CREAT SERPL: 24 (ref 10–24)
CALCIUM SERPL-MCNC: 9.7 MG/DL (ref 8.6–10.2)
CHLORIDE SERPL-SCNC: 105 MMOL/L (ref 96–106)
CHOLEST SERPL-MCNC: 191 MG/DL (ref 100–199)
CO2 SERPL-SCNC: 26 MMOL/L (ref 20–29)
CREAT SERPL-MCNC: 0.96 MG/DL (ref 0.76–1.27)
EGFR: 88 ML/MIN/1.73
GLOBULIN SER-MCNC: 2.4 G/DL (ref 1.5–4.5)
GLUCOSE SERPL-MCNC: 97 MG/DL (ref 70–99)
HDLC SERPL-MCNC: 47 MG/DL
LDLC SERPL CALC-MCNC: 126 MG/DL (ref 0–99)
LDLC/HDLC SERPL: 2.7 RATIO (ref 0–3.6)
POTASSIUM SERPL-SCNC: 4.3 MMOL/L (ref 3.5–5.2)
PROT SERPL-MCNC: 6.8 G/DL (ref 6–8.5)
PSA FREE MFR SERPL: 26.7 %
PSA FREE SERPL-MCNC: 0.96 NG/ML
PSA SERPL-MCNC: 3.6 NG/ML (ref 0–4)
SL AMB VLDL CHOLESTEROL CALC: 18 MG/DL (ref 5–40)
SODIUM SERPL-SCNC: 142 MMOL/L (ref 134–144)
TRIGL SERPL-MCNC: 99 MG/DL (ref 0–149)

## 2023-04-03 ENCOUNTER — TELEPHONE (OUTPATIENT)
Dept: FAMILY MEDICINE CLINIC | Facility: CLINIC | Age: 63
End: 2023-04-03

## 2023-04-03 NOTE — TELEPHONE ENCOUNTER
----- Message from Lauren Richardson DO sent at 4/2/2023  9:16 AM EDT -----  His cholesterol is much better  Sugar level is normal   Your prostate level is normal but higher than last year     All other labs are good  Stephanie somers

## 2023-04-10 PROBLEM — Z00.00 MEDICARE ANNUAL WELLNESS VISIT, SUBSEQUENT: Status: RESOLVED | Noted: 2019-10-14 | Resolved: 2023-04-10

## 2023-04-26 ENCOUNTER — TELEPHONE (OUTPATIENT)
Dept: GASTROENTEROLOGY | Facility: CLINIC | Age: 63
End: 2023-04-26

## 2023-06-06 ENCOUNTER — TELEPHONE (OUTPATIENT)
Dept: GASTROENTEROLOGY | Facility: CLINIC | Age: 63
End: 2023-06-06

## 2023-06-14 ENCOUNTER — VBI (OUTPATIENT)
Dept: ADMINISTRATIVE | Facility: OTHER | Age: 63
End: 2023-06-14

## 2023-06-28 ENCOUNTER — TELEPHONE (OUTPATIENT)
Dept: GASTROENTEROLOGY | Facility: CLINIC | Age: 63
End: 2023-06-28

## 2023-08-03 ENCOUNTER — VBI (OUTPATIENT)
Dept: ADMINISTRATIVE | Facility: OTHER | Age: 63
End: 2023-08-03

## 2023-11-01 ENCOUNTER — VBI (OUTPATIENT)
Dept: ADMINISTRATIVE | Facility: OTHER | Age: 63
End: 2023-11-01

## 2023-11-14 ENCOUNTER — VBI (OUTPATIENT)
Dept: ADMINISTRATIVE | Facility: OTHER | Age: 63
End: 2023-11-14

## 2024-01-25 ENCOUNTER — VBI (OUTPATIENT)
Dept: ADMINISTRATIVE | Facility: OTHER | Age: 64
End: 2024-01-25

## 2024-03-06 ENCOUNTER — TELEPHONE (OUTPATIENT)
Dept: FAMILY MEDICINE CLINIC | Facility: CLINIC | Age: 64
End: 2024-03-06

## 2024-03-06 DIAGNOSIS — F32.0 CURRENT MILD EPISODE OF MAJOR DEPRESSIVE DISORDER, UNSPECIFIED WHETHER RECURRENT (HCC): Primary | ICD-10-CM

## 2024-03-06 DIAGNOSIS — N13.8 BENIGN PROSTATIC HYPERPLASIA WITH URINARY OBSTRUCTION: ICD-10-CM

## 2024-03-06 DIAGNOSIS — E78.2 MIXED HYPERLIPIDEMIA: ICD-10-CM

## 2024-03-06 DIAGNOSIS — B18.2 CHRONIC HEPATITIS C WITHOUT HEPATIC COMA (HCC): ICD-10-CM

## 2024-03-06 DIAGNOSIS — N40.1 BENIGN PROSTATIC HYPERPLASIA WITH URINARY OBSTRUCTION: ICD-10-CM

## 2024-03-06 DIAGNOSIS — R53.82 CHRONIC FATIGUE: ICD-10-CM

## 2024-03-06 NOTE — TELEPHONE ENCOUNTER
Patient is going to [facility] to get his blood work done and would like to know what lab orders need to be placed. Patient is planning on going tomorrow 3/7.

## 2024-03-06 NOTE — TELEPHONE ENCOUNTER
Called and LVM for patient that orders in for [facility] but he should not go until after 3/30 because that is when he had it done last year so the whole blood work will be covered. If he goes tomorrow it will not be covered he has to wait until on or after 3/30

## 2024-03-28 ENCOUNTER — RA CDI HCC (OUTPATIENT)
Dept: OTHER | Facility: HOSPITAL | Age: 64
End: 2024-03-28

## 2024-04-03 LAB
ALBUMIN SERPL-MCNC: 4.4 G/DL (ref 3.9–4.9)
ALBUMIN/GLOB SERPL: 1.9 {RATIO} (ref 1.2–2.2)
ALP SERPL-CCNC: 77 IU/L (ref 44–121)
ALT SERPL-CCNC: 9 IU/L (ref 0–44)
AST SERPL-CCNC: 21 IU/L (ref 0–40)
BASOPHILS # BLD AUTO: 0.1 X10E3/UL (ref 0–0.2)
BASOPHILS NFR BLD AUTO: 1 %
BILIRUB SERPL-MCNC: 0.4 MG/DL (ref 0–1.2)
BUN SERPL-MCNC: 22 MG/DL (ref 8–27)
BUN/CREAT SERPL: 21 (ref 10–24)
CALCIUM SERPL-MCNC: 9.8 MG/DL (ref 8.6–10.2)
CHLORIDE SERPL-SCNC: 103 MMOL/L (ref 96–106)
CHOLEST SERPL-MCNC: 190 MG/DL (ref 100–199)
CO2 SERPL-SCNC: 24 MMOL/L (ref 20–29)
CREAT SERPL-MCNC: 1.07 MG/DL (ref 0.76–1.27)
EGFR: 77 ML/MIN/1.73
EOSINOPHIL # BLD AUTO: 0.2 X10E3/UL (ref 0–0.4)
EOSINOPHIL NFR BLD AUTO: 2 %
ERYTHROCYTE [DISTWIDTH] IN BLOOD BY AUTOMATED COUNT: 12.8 % (ref 11.6–15.4)
GLOBULIN SER-MCNC: 2.3 G/DL (ref 1.5–4.5)
GLUCOSE SERPL-MCNC: 100 MG/DL (ref 70–99)
HCT VFR BLD AUTO: 44.5 % (ref 37.5–51)
HDLC SERPL-MCNC: 49 MG/DL
HGB BLD-MCNC: 14.3 G/DL (ref 13–17.7)
IMM GRANULOCYTES # BLD: 0 X10E3/UL (ref 0–0.1)
IMM GRANULOCYTES NFR BLD: 0 %
LDLC SERPL CALC-MCNC: 127 MG/DL (ref 0–99)
LDLC/HDLC SERPL: 2.6 RATIO (ref 0–3.6)
LYMPHOCYTES # BLD AUTO: 2.4 X10E3/UL (ref 0.7–3.1)
LYMPHOCYTES NFR BLD AUTO: 33 %
MCH RBC QN AUTO: 27.5 PG (ref 26.6–33)
MCHC RBC AUTO-ENTMCNC: 32.1 G/DL (ref 31.5–35.7)
MCV RBC AUTO: 86 FL (ref 79–97)
MONOCYTES # BLD AUTO: 0.6 X10E3/UL (ref 0.1–0.9)
MONOCYTES NFR BLD AUTO: 9 %
NEUTROPHILS # BLD AUTO: 4 X10E3/UL (ref 1.4–7)
NEUTROPHILS NFR BLD AUTO: 55 %
PLATELET # BLD AUTO: 249 X10E3/UL (ref 150–450)
POTASSIUM SERPL-SCNC: 4.5 MMOL/L (ref 3.5–5.2)
PROT SERPL-MCNC: 6.7 G/DL (ref 6–8.5)
PSA FREE MFR SERPL: 26.3 %
PSA FREE SERPL-MCNC: 1.08 NG/ML
PSA SERPL-MCNC: 4.1 NG/ML (ref 0–4)
RBC # BLD AUTO: 5.2 X10E6/UL (ref 4.14–5.8)
SL AMB VLDL CHOLESTEROL CALC: 14 MG/DL (ref 5–40)
SODIUM SERPL-SCNC: 141 MMOL/L (ref 134–144)
TRIGL SERPL-MCNC: 78 MG/DL (ref 0–149)
TSH SERPL DL<=0.005 MIU/L-ACNC: 2.42 UIU/ML (ref 0.45–4.5)
WBC # BLD AUTO: 7.2 X10E3/UL (ref 3.4–10.8)

## 2024-04-04 ENCOUNTER — OFFICE VISIT (OUTPATIENT)
Dept: FAMILY MEDICINE CLINIC | Facility: CLINIC | Age: 64
End: 2024-04-04
Payer: COMMERCIAL

## 2024-04-04 VITALS
TEMPERATURE: 97 F | HEIGHT: 68 IN | DIASTOLIC BLOOD PRESSURE: 70 MMHG | BODY MASS INDEX: 25.76 KG/M2 | SYSTOLIC BLOOD PRESSURE: 124 MMHG | HEART RATE: 70 BPM | OXYGEN SATURATION: 95 % | WEIGHT: 170 LBS

## 2024-04-04 DIAGNOSIS — Z12.11 SCREEN FOR COLON CANCER: ICD-10-CM

## 2024-04-04 DIAGNOSIS — M54.2 CHRONIC NECK PAIN: ICD-10-CM

## 2024-04-04 DIAGNOSIS — R97.20 ELEVATED PSA, LESS THAN 10 NG/ML: ICD-10-CM

## 2024-04-04 DIAGNOSIS — G89.29 CHRONIC NECK PAIN: ICD-10-CM

## 2024-04-04 DIAGNOSIS — B18.2 CHRONIC HEPATITIS C WITHOUT HEPATIC COMA (HCC): ICD-10-CM

## 2024-04-04 DIAGNOSIS — Z00.00 MEDICARE ANNUAL WELLNESS VISIT, SUBSEQUENT: Primary | ICD-10-CM

## 2024-04-04 DIAGNOSIS — F32.5 MAJOR DEPRESSIVE DISORDER IN REMISSION, UNSPECIFIED WHETHER RECURRENT (HCC): ICD-10-CM

## 2024-04-04 PROCEDURE — G0439 PPPS, SUBSEQ VISIT: HCPCS | Performed by: FAMILY MEDICINE

## 2024-04-04 NOTE — PROGRESS NOTES
Assessment and Plan:     Problem List Items Addressed This Visit          Digestive    Chronic hepatitis C without hepatic coma (HCC)       Urinary    Elevated PSA, less than 10 ng/ml     Recent psa 4.1. pt on finasteride and flomax. Pt will recheck psa in 3 months. Active surveillance         Relevant Orders    PSA, total and free       Behavioral Health    Depression       Surgery/Wound/Pain    Chronic neck pain     Pt has stopped his pain medication. Would like to seek out alternatives. Ok for massage therapy and consider physical therapy / accupuncture.             Other    Medicare annual wellness visit, subsequent - Primary     Other Visit Diagnoses       Screen for colon cancer        Relevant Orders    Ambulatory Referral to Gastroenterology            Depression Screening and Follow-up Plan: Patient was screened for depression during today's encounter. They screened negative with a PHQ-9 score of 0.      Preventive health issues were discussed with patient, and age appropriate screening tests were ordered as noted in patient's After Visit Summary.  Personalized health advice and appropriate referrals for health education or preventive services given if needed, as noted in patient's After Visit Summary.     History of Present Illness:     Patient presents for a Medicare Wellness Visit    Pt is here for medicare wellness. Stopped his pain medications and still has some chronic pain especially in his neck but manageable. Denies any weakness in his arms.   Pt had blood work done, reviewed in office. Psa slight elevation at 4.1. had a prostate biopsy in the past that was negative. Denies any change in urinary symptoms.        Patient Care Team:  Olga Curtis DO as PCP - General  Olga Curtis DO     Review of Systems:     Review of Systems   Constitutional: Negative.  Negative for fatigue and fever.   HENT: Negative.     Eyes: Negative.    Respiratory: Negative.  Negative for cough.    Cardiovascular: Negative.     Gastrointestinal: Negative.    Endocrine: Negative.    Genitourinary: Negative.    Musculoskeletal:  Positive for neck pain and neck stiffness.   Skin: Negative.    Allergic/Immunologic: Negative.    Neurological: Negative.    Psychiatric/Behavioral: Negative.          Problem List:     Patient Active Problem List   Diagnosis    Benign prostatic hyperplasia with urinary obstruction    Chronic neck pain    Chronic pain    Depression    Elevated PSA, less than 10 ng/ml    Esophageal reflux    Fatigue    Generalized anxiety disorder    Chronic hepatitis C without hepatic coma (HCC)    Mixed hyperlipidemia    Medicare annual wellness visit, subsequent    Mild episode of recurrent major depressive disorder (HCC)    Lumbar neuritis    Acute left-sided low back pain with left-sided sciatica    Cervical neuritis    Other headache syndrome    Disease of spinal cord, unspecified (HCC)      Past Medical and Surgical History:     Past Medical History:   Diagnosis Date    Hiatal hernia     Long-term use of high-risk medication 9/7/2021    Lyme disease     Mononeuritis of upper limb, unspecified laterality     Neoplasm of uncertain behavior of skin      Past Surgical History:   Procedure Laterality Date    CERVICAL FUSION      Refusion of Vertebrae    EPIDURAL BLOCK INJECTION  07/26/2021    HERNIA REPAIR      TRIGGER POINT INJECTION  05/2021      Family History:     Family History   Problem Relation Age of Onset    Diabetes Mother         Mellitus    No Known Problems Father     Alcohol abuse Family       Social History:     Social History     Socioeconomic History    Marital status:      Spouse name: None    Number of children: None    Years of education: None    Highest education level: None   Occupational History    None   Tobacco Use    Smoking status: Former    Smokeless tobacco: Never   Vaping Use    Vaping status: Never Used   Substance and Sexual Activity    Alcohol use: No    Drug use: Yes     Types: Marijuana     Sexual activity: Not Currently   Other Topics Concern    None   Social History Narrative    None     Social Determinants of Health     Financial Resource Strain: Low Risk  (2/9/2023)    Overall Financial Resource Strain (CARDIA)     Difficulty of Paying Living Expenses: Not hard at all   Food Insecurity: No Food Insecurity (4/4/2024)    Hunger Vital Sign     Worried About Running Out of Food in the Last Year: Never true     Ran Out of Food in the Last Year: Never true   Transportation Needs: No Transportation Needs (4/4/2024)    PRAPARE - Transportation     Lack of Transportation (Medical): No     Lack of Transportation (Non-Medical): No   Physical Activity: Not on file   Stress: Not on file   Social Connections: Not on file   Intimate Partner Violence: Not on file   Housing Stability: Unknown (4/4/2024)    Housing Stability Vital Sign     Unable to Pay for Housing in the Last Year: No     Number of Places Lived in the Last Year: Not on file     Unstable Housing in the Last Year: No      Medications and Allergies:     Current Outpatient Medications   Medication Sig Dispense Refill    finasteride (PROSCAR) 5 mg tablet Take 5 mg by mouth daily  3    tamsulosin (FLOMAX) 0.4 mg 0.4 mg daily        No current facility-administered medications for this visit.     Allergies   Allergen Reactions    Penicillins Anaphylaxis     Category: Allergy;     Citalopram      Other reaction(s): mental status changes  Category: Adverse Reaction;     Sulfa Antibiotics Itching     Category: Allergy;     Sulfamethoxazole-Trimethoprim Rash      Immunizations:     Immunization History   Administered Date(s) Administered    COVID-19 PFIZER VACCINE 0.3 ML IM 04/23/2021, 05/18/2021    INFLUENZA 11/01/2018, 09/14/2019, 09/03/2021, 09/15/2022    Influenza Injectable, MDCK, Preservative Free, Quadrivalent, 0.5 mL 08/30/2019    Influenza, injectable, quadrivalent, preservative free 0.5 mL 10/04/2018, 09/14/2020    Tdap 08/05/2013      Health  Maintenance:         Topic Date Due    HIV Screening  Never done    Colorectal Cancer Screening  06/05/2022    Hepatitis C Screening  Discontinued         Topic Date Due    Pneumococcal Vaccine: 65+ Years (1 of 2 - PCV) Never done    Hepatitis A Vaccine (1 of 2 - Risk 2-dose series) Never done    Hepatitis B Vaccine (1 of 3 - Risk 3-dose series) Never done    Influenza Vaccine (1) 09/01/2023    COVID-19 Vaccine (3 - 2023-24 season) 09/01/2023      Medicare Screening Tests and Risk Assessments:     Estuardo is here for his Subsequent Wellness visit. Last Medicare Wellness visit information reviewed, patient interviewed, no change since last AWV.     Health Risk Assessment:   Patient rates overall health as good. Patient feels that their physical health rating is same. Patient is satisfied with their life. Eyesight was rated as same. Patient feels that their emotional and mental health rating is same. Patients states they are never, rarely angry. Patient states they are never, rarely unusually tired/fatigued. Pain experienced in the last 7 days has been none. Patient states that he has experienced no weight loss or gain in last 6 months.     Depression Screening:   PHQ-9 Score: 0      Fall Risk Screening:   In the past year, patient has experienced: no history of falling in past year      Home Safety:  Patient does not have trouble with stairs inside or outside of their home. Patient has working smoke alarms and has working carbon monoxide detector. Home safety hazards include: none.     Nutrition:   Current diet is Regular.     Medications:   Patient is not currently taking any over-the-counter supplements. Patient is able to manage medications.     Activities of Daily Living (ADLs)/Instrumental Activities of Daily Living (IADLs):   Walk and transfer into and out of bed and chair?: Yes  Dress and groom yourself?: Yes    Bathe or shower yourself?: Yes    Feed yourself? Yes  Do your laundry/housekeeping?: Yes  Manage your  "money, pay your bills and track your expenses?: Yes  Make your own meals?: Yes    Do your own shopping?: Yes    Previous Hospitalizations:   Any hospitalizations or ED visits within the last 12 months?: No      PREVENTIVE SCREENINGS      Cardiovascular Screening:    General: Screening Not Indicated and History Lipid Disorder      Diabetes Screening:     General: Screening Current      Prostate Cancer Screening:    General: Screening Current      Abdominal Aortic Aneurysm (AAA) Screening:    Risk factors include: age between 65-76 yo and tobacco use        Lung Cancer Screening:     General: Screening Not Indicated      Hepatitis C Screening:    General: Screening Not Indicated and History Hepatitis C    Screening, Brief Intervention, and Referral to Treatment (SBIRT)    Screening  Typical number of drinks in a day: 0  Typical number of drinks in a week: 0  Interpretation: Low risk drinking behavior.    AUDIT-C Screenin) How often did you have a drink containing alcohol in the past year? never  2) How many drinks did you have on a typical day when you were drinking in the past year? 0  3) How often did you have 6 or more drinks on one occasion in the past year? never    AUDIT-C Score: 0  Interpretation: Score 0-3 (male): Negative screen for alcohol misuse    Single Item Drug Screening:  How often have you used an illegal drug (including marijuana) or a prescription medication for non-medical reasons in the past year? daily or almost daily    Single Item Drug Screen Score: 4  Interpretation: POSITIVE screen for possible drug use disorder    Drug Abuse Screening Test (DAST-10):  1) Have you used drugs other than those required for medical reasons? Yes  2) Do you abuse more than one drug at a time? No  3) Are you always able to stop using drugs when you want to? Yes  4) Have you had \"blackouts\" or \"flashbacks\" as a result of drug use? No  5) Do you ever feel bad or guilty about your drug use? No  6) Does your " "spouse (or parents) ever complain about your involvement with drugs? No  7) Have you neglected your family because of your use of drugs? No  8) Have you engaged in illegal activities in order to obtain drugs? No  9) Have you ever experienced withdrawal symptoms (felt sick) when you stopped taking drugs? No  10) Have you had medical problems as a result of your drug use (e.g., memory loss, hepatitis, convulsions, bleeding, etc.)? No    DAST-10 Score: 1  Interpretation: Low level problems related to drug abuse    No results found.     Physical Exam:     /70   Pulse 70   Temp (!) 97 °F (36.1 °C) (Tympanic)   Ht 5' 8\" (1.727 m)   Wt 77.1 kg (170 lb)   SpO2 95%   BMI 25.85 kg/m²     Physical Exam  Vitals and nursing note reviewed.   Constitutional:       Appearance: He is well-developed.   HENT:      Head: Normocephalic.      Right Ear: External ear normal.      Left Ear: External ear normal.      Nose: Nose normal.   Eyes:      Conjunctiva/sclera: Conjunctivae normal.      Pupils: Pupils are equal, round, and reactive to light.   Cardiovascular:      Rate and Rhythm: Normal rate and regular rhythm.   Pulmonary:      Effort: Pulmonary effort is normal.      Breath sounds: Normal breath sounds.   Abdominal:      General: Bowel sounds are normal.      Palpations: Abdomen is soft.   Musculoskeletal:         General: Tenderness present. Normal range of motion.      Cervical back: Normal range of motion and neck supple.      Comments: Tenderness bilateral paraspinal cervical C2-C6 with decreased rom rotation and side bending.    Skin:     General: Skin is warm and dry.   Neurological:      Mental Status: He is alert and oriented to person, place, and time.   Psychiatric:         Behavior: Behavior normal.         Thought Content: Thought content normal.         Judgment: Judgment normal.          Olga Curtis, DO  "

## 2024-04-04 NOTE — PATIENT INSTRUCTIONS
Schedule colonoscopy  Repeat prostate level in 3 months.  Continue tamsulosin and finasteride.  Massage therapy/ accupuncture/ physical therapy for chronic neck pain    Medicare Preventive Visit Patient Instructions  Thank you for completing your Welcome to Medicare Visit or Medicare Annual Wellness Visit today. Your next wellness visit will be due in one year (4/5/2025).  The screening/preventive services that you may require over the next 5-10 years are detailed below. Some tests may not apply to you based off risk factors and/or age. Screening tests ordered at today's visit but not completed yet may show as past due. Also, please note that scanned in results may not display below.  Preventive Screenings:  Service Recommendations Previous Testing/Comments   Colorectal Cancer Screening  Colonoscopy    Fecal Occult Blood Test (FOBT)/Fecal Immunochemical Test (FIT)  Fecal DNA/Cologuard Test  Flexible Sigmoidoscopy Age: 45-75 years old   Colonoscopy: every 10 years (May be performed more frequently if at higher risk)  OR  FOBT/FIT: every 1 year  OR  Cologuard: every 3 years  OR  Sigmoidoscopy: every 5 years  Screening may be recommended earlier than age 45 if at higher risk for colorectal cancer. Also, an individualized decision between you and your healthcare provider will decide whether screening between the ages of 76-85 would be appropriate. Colonoscopy: 06/05/2012  FOBT/FIT: Not on file  Cologuard: Not on file  Sigmoidoscopy: Not on file          Prostate Cancer Screening Individualized decision between patient and health care provider in men between ages of 55-69   Medicare will cover every 12 months beginning on the day after your 50th birthday PSA: 4.1 ng/mL     Screening Current     Hepatitis C Screening Once for adults born between 1945 and 1965  More frequently in patients at high risk for Hepatitis C Hep C Antibody: Not on file    Screening Not Indicated  History Hepatitis C   Diabetes Screening 1-2 times  per year if you're at risk for diabetes or have pre-diabetes Fasting glucose: No results in last 5 years (No results in last 5 years)  A1C: No results in last 5 years (No results in last 5 years)  Screening Current   Cholesterol Screening Once every 5 years if you don't have a lipid disorder. May order more often based on risk factors. Lipid panel: 04/02/2024  Screening Not Indicated  History Lipid Disorder      Other Preventive Screenings Covered by Medicare:  Abdominal Aortic Aneurysm (AAA) Screening: covered once if your at risk. You're considered to be at risk if you have a family history of AAA or a male between the age of 65-75 who smoking at least 100 cigarettes in your lifetime.  Lung Cancer Screening: covers low dose CT scan once per year if you meet all of the following conditions: (1) Age 55-77; (2) No signs or symptoms of lung cancer; (3) Current smoker or have quit smoking within the last 15 years; (4) You have a tobacco smoking history of at least 20 pack years (packs per day x number of years you smoked); (5) You get a written order from a healthcare provider.  Glaucoma Screening: covered annually if you're considered high risk: (1) You have diabetes OR (2) Family history of glaucoma OR (3)  aged 50 and older OR (4)  American aged 65 and older  Osteoporosis Screening: covered every 2 years if you meet one of the following conditions: (1) Have a vertebral abnormality; (2) On glucocorticoid therapy for more than 3 months; (3) Have primary hyperparathyroidism; (4) On osteoporosis medications and need to assess response to drug therapy.  HIV Screening: covered annually if you're between the age of 15-65. Also covered annually if you are younger than 15 and older than 65 with risk factors for HIV infection. For pregnant patients, it is covered up to 3 times per pregnancy.    Immunizations:  Immunization Recommendations   Influenza Vaccine Annual influenza vaccination during flu  season is recommended for all persons aged >= 6 months who do not have contraindications   Pneumococcal Vaccine   * Pneumococcal conjugate vaccine = PCV13 (Prevnar 13), PCV15 (Vaxneuvance), PCV20 (Prevnar 20)  * Pneumococcal polysaccharide vaccine = PPSV23 (Pneumovax) Adults 19-65 yo with certain risk factors or if 65+ yo  If never received any pneumonia vaccine: recommend Prevnar 20 (PCV20)  Give PCV20 if previously received 1 dose of PCV13 or PPSV23   Hepatitis B Vaccine 3 dose series if at intermediate or high risk (ex: diabetes, end stage renal disease, liver disease)   Respiratory syncytial virus (RSV) Vaccine - COVERED BY MEDICARE PART D  * RSVPreF3 (Arexvy) CDC recommends that adults 60 years of age and older may receive a single dose of RSV vaccine using shared clinical decision-making (SCDM)   Tetanus (Td) Vaccine - COST NOT COVERED BY MEDICARE PART B Following completion of primary series, a booster dose should be given every 10 years to maintain immunity against tetanus. Td may also be given as tetanus wound prophylaxis.   Tdap Vaccine - COST NOT COVERED BY MEDICARE PART B Recommended at least once for all adults. For pregnant patients, recommended with each pregnancy.   Shingles Vaccine (Shingrix) - COST NOT COVERED BY MEDICARE PART B  2 shot series recommended in those 19 years and older who have or will have weakened immune systems or those 50 years and older     Health Maintenance Due:      Topic Date Due    HIV Screening  Never done    Colorectal Cancer Screening  06/05/2022    Hepatitis C Screening  Discontinued     Immunizations Due:      Topic Date Due    Pneumococcal Vaccine: 65+ Years (1 of 2 - PCV) Never done    Hepatitis A Vaccine (1 of 2 - Risk 2-dose series) Never done    Hepatitis B Vaccine (1 of 3 - Risk 3-dose series) Never done    Influenza Vaccine (1) 09/01/2023    COVID-19 Vaccine (3 - 2023-24 season) 09/01/2023     Advance Directives   What are advance directives?  Advance directives  are legal documents that state your wishes and plans for medical care. These plans are made ahead of time in case you lose your ability to make decisions for yourself. Advance directives can apply to any medical decision, such as the treatments you want, and if you want to donate organs.   What are the types of advance directives?  There are many types of advance directives, and each state has rules about how to use them. You may choose a combination of any of the following:  Living will:  This is a written record of the treatment you want. You can also choose which treatments you do not want, which to limit, and which to stop at a certain time. This includes surgery, medicine, IV fluid, and tube feedings.   Durable power of  for healthcare (DPAHC):  This is a written record that states who you want to make healthcare choices for you when you are unable to make them for yourself. This person, called a proxy, is usually a family member or a friend. You may choose more than 1 proxy.  Do not resuscitate (DNR) order:  A DNR order is used in case your heart stops beating or you stop breathing. It is a request not to have certain forms of treatment, such as CPR. A DNR order may be included in other types of advance directives.  Medical directive:  This covers the care that you want if you are in a coma, near death, or unable to make decisions for yourself. You can list the treatments you want for each condition. Treatment may include pain medicine, surgery, blood transfusions, dialysis, IV or tube feedings, and a ventilator (breathing machine).  Values history:  This document has questions about your views, beliefs, and how you feel and think about life. This information can help others choose the care that you would choose.  Why are advance directives important?  An advance directive helps you control your care. Although spoken wishes may be used, it is better to have your wishes written down. Spoken wishes can  be misunderstood, or not followed. Treatments may be given even if you do not want them. An advance directive may make it easier for your family to make difficult choices about your care.   Weight Management   Why it is important to manage your weight:  Being overweight increases your risk of health conditions such as heart disease, high blood pressure, type 2 diabetes, and certain types of cancer. It can also increase your risk for osteoarthritis, sleep apnea, and other respiratory problems. Aim for a slow, steady weight loss. Even a small amount of weight loss can lower your risk of health problems.  How to lose weight safely:  A safe and healthy way to lose weight is to eat fewer calories and get regular exercise. You can lose up about 1 pound a week by decreasing the number of calories you eat by 500 calories each day.   Healthy meal plan for weight management:  A healthy meal plan includes a variety of foods, contains fewer calories, and helps you stay healthy. A healthy meal plan includes the following:  Eat whole-grain foods more often.  A healthy meal plan should contain fiber. Fiber is the part of grains, fruits, and vegetables that is not broken down by your body. Whole-grain foods are healthy and provide extra fiber in your diet. Some examples of whole-grain foods are whole-wheat breads and pastas, oatmeal, brown rice, and bulgur.  Eat a variety of vegetables every day.  Include dark, leafy greens such as spinach, kale, jadyn greens, and mustard greens. Eat yellow and orange vegetables such as carrots, sweet potatoes, and winter squash.   Eat a variety of fruits every day.  Choose fresh or canned fruit (canned in its own juice or light syrup) instead of juice. Fruit juice has very little or no fiber.  Eat low-fat dairy foods.  Drink fat-free (skim) milk or 1% milk. Eat fat-free yogurt and low-fat cottage cheese. Try low-fat cheeses such as mozzarella and other reduced-fat cheeses.  Choose meat and other  protein foods that are low in fat.  Choose beans or other legumes such as split peas or lentils. Choose fish, skinless poultry (chicken or turkey), or lean cuts of red meat (beef or pork). Before you cook meat or poultry, cut off any visible fat.   Use less fat and oil.  Try baking foods instead of frying them. Add less fat, such as margarine, sour cream, regular salad dressing and mayonnaise to foods. Eat fewer high-fat foods. Some examples of high-fat foods include french fries, doughnuts, ice cream, and cakes.  Eat fewer sweets.  Limit foods and drinks that are high in sugar. This includes candy, cookies, regular soda, and sweetened drinks.  Exercise:  Exercise at least 30 minutes per day on most days of the week. Some examples of exercise include walking, biking, dancing, and swimming. You can also fit in more physical activity by taking the stairs instead of the elevator or parking farther away from stores. Ask your healthcare provider about the best exercise plan for you.      © Copyright [employer] 2018 Information is for End User's use only and may not be sold, redistributed or otherwise used for commercial purposes. All illustrations and images included in CareNotes® are the copyrighted property of [employer] or [employer]

## 2024-04-07 NOTE — ASSESSMENT & PLAN NOTE
Recent psa 4.1. pt on finasteride and flomax. Pt will recheck psa in 3 months. Active surveillance

## 2024-04-07 NOTE — ASSESSMENT & PLAN NOTE
Pt has stopped his pain medication. Would like to seek out alternatives. Ok for massage therapy and consider physical therapy / accupuncture.

## 2024-05-07 PROBLEM — Z00.00 MEDICARE ANNUAL WELLNESS VISIT, SUBSEQUENT: Status: RESOLVED | Noted: 2019-10-14 | Resolved: 2024-05-07

## 2024-05-09 ENCOUNTER — TELEPHONE (OUTPATIENT)
Age: 64
End: 2024-05-09

## 2024-05-09 NOTE — TELEPHONE ENCOUNTER
PT call to get the number for the doctor who is treating him for his neck and back he did not have the number. Thank you

## 2024-05-28 ENCOUNTER — VBI (OUTPATIENT)
Dept: ADMINISTRATIVE | Facility: OTHER | Age: 64
End: 2024-05-28

## 2024-07-02 ENCOUNTER — HOSPITAL ENCOUNTER (EMERGENCY)
Dept: HOSPITAL 99 - EMR | Age: 64
Discharge: HOME | End: 2024-07-02
Payer: COMMERCIAL

## 2024-07-02 VITALS — DIASTOLIC BLOOD PRESSURE: 96 MMHG | SYSTOLIC BLOOD PRESSURE: 158 MMHG

## 2024-07-02 VITALS — RESPIRATION RATE: 18 BRPM | DIASTOLIC BLOOD PRESSURE: 99 MMHG | SYSTOLIC BLOOD PRESSURE: 196 MMHG

## 2024-07-02 DIAGNOSIS — R03.0: ICD-10-CM

## 2024-07-02 DIAGNOSIS — M54.2: ICD-10-CM

## 2024-07-02 DIAGNOSIS — G89.29: ICD-10-CM

## 2024-07-02 DIAGNOSIS — R60.0: ICD-10-CM
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
## 2024-07-02 DIAGNOSIS — S62.001A: Primary | ICD-10-CM

## 2024-07-02 DIAGNOSIS — S60.211A: ICD-10-CM

## 2024-07-02 DIAGNOSIS — Z88.0: ICD-10-CM

## 2024-07-02 PROCEDURE — 99283 EMERGENCY DEPT VISIT LOW MDM: CPT

## 2024-07-02 PROCEDURE — 29125 APPL SHORT ARM SPLINT STATIC: CPT

## 2024-07-05 ENCOUNTER — HOSPITAL ENCOUNTER (EMERGENCY)
Dept: HOSPITAL 99 - EMR | Age: 64
Discharge: HOME | End: 2024-07-05
Payer: COMMERCIAL

## 2024-07-05 VITALS — DIASTOLIC BLOOD PRESSURE: 83 MMHG | SYSTOLIC BLOOD PRESSURE: 166 MMHG | RESPIRATION RATE: 17 BRPM
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
## 2024-07-05 DIAGNOSIS — S16.1XXA: Primary | ICD-10-CM

## 2024-07-05 PROCEDURE — 99282 EMERGENCY DEPT VISIT SF MDM: CPT

## 2024-07-17 ENCOUNTER — OFFICE VISIT (OUTPATIENT)
Dept: FAMILY MEDICINE CLINIC | Facility: CLINIC | Age: 64
End: 2024-07-17
Payer: COMMERCIAL

## 2024-07-17 VITALS
BODY MASS INDEX: 25.61 KG/M2 | HEART RATE: 64 BPM | WEIGHT: 169 LBS | DIASTOLIC BLOOD PRESSURE: 72 MMHG | OXYGEN SATURATION: 98 % | SYSTOLIC BLOOD PRESSURE: 118 MMHG | HEIGHT: 68 IN | TEMPERATURE: 97.2 F

## 2024-07-17 DIAGNOSIS — S19.9XXD NECK INJURY, SUBSEQUENT ENCOUNTER: ICD-10-CM

## 2024-07-17 DIAGNOSIS — S16.1XXD STRAIN OF NECK MUSCLE, SUBSEQUENT ENCOUNTER: ICD-10-CM
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
## 2024-07-17 DIAGNOSIS — S62.001D CLOSED NONDISPLACED FRACTURE OF SCAPHOID OF RIGHT WRIST WITH ROUTINE HEALING, UNSPECIFIED PORTION OF SCAPHOID, SUBSEQUENT ENCOUNTER: ICD-10-CM
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
## 2024-07-17 PROBLEM — S16.1XXA CERVICAL STRAIN: Status: ACTIVE | Noted: 2024-07-17

## 2024-07-17 PROBLEM — S62.001A: Status: ACTIVE | Noted: 2024-07-17

## 2024-07-17 PROCEDURE — 99214 OFFICE O/P EST MOD 30 MIN: CPT | Performed by: FAMILY MEDICINE
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
## 2024-07-18 NOTE — PATIENT INSTRUCTIONS
Mri cervical spine  Keep follow up with hand surgeon  Keep follow up with orthopedist for neck pain

## 2024-07-30 ENCOUNTER — VBI (OUTPATIENT)
Dept: ADMINISTRATIVE | Facility: OTHER | Age: 64
End: 2024-07-30

## 2024-07-30 NOTE — TELEPHONE ENCOUNTER
07/30/24 12:52 PM     Chart reviewed for CRC: Colonoscopy ; nothing is submitted to the patient's insurance at this time.     Maryam Ware   [facility]
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
## 2024-09-14 ENCOUNTER — HOSPITAL ENCOUNTER (OUTPATIENT)
Dept: MRI IMAGING | Facility: HOSPITAL | Age: 64
Discharge: HOME | End: 2024-09-14
Payer: COMMERCIAL

## 2024-09-14 DIAGNOSIS — S19.9XXD NECK INJURY, SUBSEQUENT ENCOUNTER: ICD-10-CM
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
## 2024-09-14 PROCEDURE — 72141 MRI NECK SPINE W/O DYE: CPT

## 2024-11-07 ENCOUNTER — VBI (OUTPATIENT)
Dept: ADMINISTRATIVE | Facility: OTHER | Age: 64
End: 2024-11-07

## 2024-11-07 NOTE — TELEPHONE ENCOUNTER
11/07/24 3:31 PM     Chart reviewed for blood pressure was/were submitted to the patient's insurance.     Maryam Ware   [facility]

## 2025-01-07 ENCOUNTER — VBI (OUTPATIENT)
Dept: ADMINISTRATIVE | Facility: OTHER | Age: 65
End: 2025-01-07

## 2025-01-07 NOTE — TELEPHONE ENCOUNTER
01/07/25 11:11 AM     Chart reviewed for CRC: Colonoscopy ; nothing is submitted to the patient's insurance at this time.     Maryam Ware   [facility]

## 2025-02-03 ENCOUNTER — TELEPHONE (OUTPATIENT)
Age: 65
End: 2025-02-03

## 2025-04-07 ENCOUNTER — TELEPHONE (OUTPATIENT)
Age: 65
End: 2025-04-07

## 2025-04-07 DIAGNOSIS — R53.82 CHRONIC FATIGUE: ICD-10-CM

## 2025-04-07 DIAGNOSIS — E78.2 MIXED HYPERLIPIDEMIA: ICD-10-CM

## 2025-04-07 DIAGNOSIS — B18.2 CHRONIC HEPATITIS C WITHOUT HEPATIC COMA (HCC): ICD-10-CM

## 2025-04-07 DIAGNOSIS — N40.1 BENIGN PROSTATIC HYPERPLASIA WITH URINARY OBSTRUCTION: ICD-10-CM

## 2025-04-07 DIAGNOSIS — F32.0 CURRENT MILD EPISODE OF MAJOR DEPRESSIVE DISORDER, UNSPECIFIED WHETHER RECURRENT (HCC): ICD-10-CM

## 2025-04-07 DIAGNOSIS — N13.8 BENIGN PROSTATIC HYPERPLASIA WITH URINARY OBSTRUCTION: ICD-10-CM

## 2025-04-07 NOTE — TELEPHONE ENCOUNTER
Patient requesting labs be placed [facility] [place] at [phone].    Once it has been sent he would like a call to let him know.     Offered to schedule AWV but he declined.

## 2025-04-08 ENCOUNTER — TELEPHONE (OUTPATIENT)
Age: 65
End: 2025-04-08

## 2025-04-08 NOTE — TELEPHONE ENCOUNTER
Estuardo would like for his PSA results for this year and last year to be faxed over. This is for [facility]. Fax number [phone] and phone number [phone].

## 2025-04-09 LAB
ALBUMIN SERPL-MCNC: 4.7 G/DL (ref 3.9–4.9)
ALP SERPL-CCNC: 88 IU/L (ref 44–121)
ALT SERPL-CCNC: 17 IU/L (ref 0–44)
AST SERPL-CCNC: 31 IU/L (ref 0–40)
BASOPHILS # BLD AUTO: 0 X10E3/UL (ref 0–0.2)
BASOPHILS NFR BLD AUTO: 1 %
BILIRUB SERPL-MCNC: 0.5 MG/DL (ref 0–1.2)
BUN SERPL-MCNC: 23 MG/DL (ref 8–27)
BUN/CREAT SERPL: 23 (ref 10–24)
CALCIUM SERPL-MCNC: 9.9 MG/DL (ref 8.6–10.2)
CHLORIDE SERPL-SCNC: 102 MMOL/L (ref 96–106)
CHOLEST SERPL-MCNC: 212 MG/DL (ref 100–199)
CO2 SERPL-SCNC: 23 MMOL/L (ref 20–29)
CREAT SERPL-MCNC: 1.02 MG/DL (ref 0.76–1.27)
EGFR: 81 ML/MIN/1.73
EOSINOPHIL # BLD AUTO: 0.2 X10E3/UL (ref 0–0.4)
EOSINOPHIL NFR BLD AUTO: 2 %
ERYTHROCYTE [DISTWIDTH] IN BLOOD BY AUTOMATED COUNT: 12.8 % (ref 11.6–15.4)
GLOBULIN SER-MCNC: 2.7 G/DL (ref 1.5–4.5)
GLUCOSE SERPL-MCNC: 103 MG/DL (ref 70–99)
HCT VFR BLD AUTO: 45.5 % (ref 37.5–51)
HDLC SERPL-MCNC: 46 MG/DL
HGB BLD-MCNC: 14.6 G/DL (ref 13–17.7)
IMM GRANULOCYTES # BLD: 0 X10E3/UL (ref 0–0.1)
IMM GRANULOCYTES NFR BLD: 0 %
LDLC SERPL CALC-MCNC: 143 MG/DL (ref 0–99)
LDLC/HDLC SERPL: 3.1 RATIO (ref 0–3.6)
LYMPHOCYTES # BLD AUTO: 2.4 X10E3/UL (ref 0.7–3.1)
LYMPHOCYTES NFR BLD AUTO: 34 %
MCH RBC QN AUTO: 27.3 PG (ref 26.6–33)
MCHC RBC AUTO-ENTMCNC: 32.1 G/DL (ref 31.5–35.7)
MCV RBC AUTO: 85 FL (ref 79–97)
MONOCYTES # BLD AUTO: 0.5 X10E3/UL (ref 0.1–0.9)
MONOCYTES NFR BLD AUTO: 7 %
NEUTROPHILS # BLD AUTO: 4 X10E3/UL (ref 1.4–7)
NEUTROPHILS NFR BLD AUTO: 56 %
PLATELET # BLD AUTO: 284 X10E3/UL (ref 150–450)
POTASSIUM SERPL-SCNC: 4.8 MMOL/L (ref 3.5–5.2)
PROT SERPL-MCNC: 7.4 G/DL (ref 6–8.5)
PSA FREE MFR SERPL: 26.1 %
PSA FREE SERPL-MCNC: 1.33 NG/ML
PSA SERPL-MCNC: 5.1 NG/ML (ref 0–4)
RBC # BLD AUTO: 5.35 X10E6/UL (ref 4.14–5.8)
SL AMB VLDL CHOLESTEROL CALC: 23 MG/DL (ref 5–40)
SODIUM SERPL-SCNC: 141 MMOL/L (ref 134–144)
TRIGL SERPL-MCNC: 126 MG/DL (ref 0–149)
TSH SERPL DL<=0.005 MIU/L-ACNC: 3.29 UIU/ML (ref 0.45–4.5)
WBC # BLD AUTO: 7.1 X10E3/UL (ref 3.4–10.8)

## 2025-04-11 ENCOUNTER — RESULTS FOLLOW-UP (OUTPATIENT)
Dept: FAMILY MEDICINE CLINIC | Facility: CLINIC | Age: 65
End: 2025-04-11

## 2025-04-14 ENCOUNTER — TELEPHONE (OUTPATIENT)
Dept: FAMILY MEDICINE CLINIC | Facility: CLINIC | Age: 65
End: 2025-04-14

## 2025-04-14 NOTE — TELEPHONE ENCOUNTER
Patient called requesting PSA labs be faxed to Dr. Montes for this year and last.    Faxed through right fax to [phone]

## 2025-05-01 ENCOUNTER — RA CDI HCC (OUTPATIENT)
Dept: OTHER | Facility: HOSPITAL | Age: 65
End: 2025-05-01

## 2025-05-08 ENCOUNTER — OFFICE VISIT (OUTPATIENT)
Dept: FAMILY MEDICINE CLINIC | Facility: CLINIC | Age: 65
End: 2025-05-08
Payer: COMMERCIAL

## 2025-05-08 VITALS
WEIGHT: 168 LBS | DIASTOLIC BLOOD PRESSURE: 76 MMHG | SYSTOLIC BLOOD PRESSURE: 118 MMHG | OXYGEN SATURATION: 96 % | HEART RATE: 67 BPM | HEIGHT: 68 IN | BODY MASS INDEX: 25.46 KG/M2 | TEMPERATURE: 96.7 F

## 2025-05-08 DIAGNOSIS — Z12.11 COLON CANCER SCREENING: ICD-10-CM

## 2025-05-08 DIAGNOSIS — R97.20 ELEVATED PSA, LESS THAN 10 NG/ML: ICD-10-CM

## 2025-05-08 DIAGNOSIS — N40.1 BENIGN PROSTATIC HYPERPLASIA WITH URINARY OBSTRUCTION: ICD-10-CM

## 2025-05-08 DIAGNOSIS — Z00.00 MEDICARE ANNUAL WELLNESS VISIT, SUBSEQUENT: Primary | ICD-10-CM

## 2025-05-08 DIAGNOSIS — N13.8 BENIGN PROSTATIC HYPERPLASIA WITH URINARY OBSTRUCTION: ICD-10-CM

## 2025-05-08 PROCEDURE — G0439 PPPS, SUBSEQ VISIT: HCPCS | Performed by: FAMILY MEDICINE

## 2025-05-08 NOTE — PATIENT INSTRUCTIONS
"Schedule colonoscopy  Follow up with urology   Patient Education     Low-fat diet   The Basics   Written by the doctors and editors at [facility]   What are fats? -- The fat in the food you eat is often classified into 2 groups:   \"Healthy\" fats - These are monounsaturated or polyunsaturated fats. They tend to be more liquid at room temperature. Healthy fats are found in things like olive oil, canola oil, and sesame oil. They are also found in nuts, seeds, avocados, and nut butters.   \"Unhealthy\" fats - These are saturated and trans fats. Saturated fats are animal fats. Trans fats are artificial fats, like partially hydrogenated oils. These fats raise your cholesterol. Unhealthy fats tend to be more solid at room temperature. They are found in meats, egg yolks, butter, cheese, and full-fat milk products. They are also found in some fried foods, butter, margarine, and baked goods like cookies or cakes.  Why do I need a low-fat diet? -- The amounts and kinds of fats you eat can affect your health. This is especially true if you have specific conditions such as blocked arteries or gallbladder problems. Eating fewer unhealthy fats is 1 way to improve your health.  Some people try to eat less fat because they want to lose weight or avoid gaining weight. But this does not always work. That's because weight gain is related to how many calories you eat, no matter what foods they come from. Eating fewer unhealthy fats can be an important part of a weight loss plan. But it's also important to be aware of how many calories you are getting from other foods.  What can I eat and drink on a low-fat diet? -- Choose foods with healthy fats.  Foods with healthy fats include:   Canola, peanut, and olive oil   Safflower, sunflower, soybean, and corn oil   Walnuts, almonds, pecans, hazelnuts, cashews, and peanuts   Pumpkin, sesame, flax, and sunflower seeds   Dunning, tuna, and some other fish   Tofu   Soy milk   Avocado  Other healthy " "foods with lower amounts of fats include:   Fat-free (non-fat) or low-fat milk, yogurt, and cheese   Light, low-fat or fat-free cream cheese and sour cream   Dried beans, lentils, and tofu   Fruits and vegetables   Whole-grain breads, cereals, pastas, and rice   High-fiber foods, like oatmeal, fruits, beans, and nuts. These have soluble fiber, which helps lower cholesterol in the body.   Deli ham, turkey, chicken breast, and lean roast beef  What foods and drinks should I limit on a low-fat diet? -- It is important to limit certain foods with unhealthy fats.  Limit these types of foods that have saturated fats:   Whole-fat dairy products like cheese, ice cream, whole milk, and cream   High-fat meats like beef, lamb, poultry with the skin, flores, hot dogs, and sausage   Processed deli meats like bologna, pepperoni, and salami   Butter and lard   Palm and coconut oils   Mayonnaise, salad dressings, gravies, and sauces  Limit these types of foods that might have trans fats:   Granola, candy, and baked goods like cookies, cakes, doughnuts, and muffins   Pizza dough, and pie crusts that are packaged   Fried foods   Frozen dinners   Chips and crackers   Microwave popcorn   Stick margarine and vegetable shortenings  What else should I know? -- You do not have to remove all fat from your diet. Instead, pay attention to the amount and kinds of fats that you eat.   Read the labels of store-bought foods (figure 1) to find out how much fat is in each. Under 5 percent of total fat on a label means that it is \"low fat.\" Over 20 percent of total fat on a label means that it is \"high fat.\" Avoid foods with \"partially hydrogenated oil\" in the ingredient list. This means that there is trans fat in the food.   Change how you cook to help lower the amount of fat in your food:   Remove the fatty parts of meat and the skin from poultry before cooking   Bake, broil, grill, poach, or roast poultry, fish, and lean meats   Drain and throw " "away the fat that comes out of meat as you cook it   Try to add little or no fat to foods   Use olive or canola oil for cooking or baking   Steam your vegetables   Use herbs or no-oil marinades to flavor foods  All topics are updated as new evidence becomes available and our peer review process is complete.  This topic retrieved from [employer] on: Mar 13, 2024.  Topic 214870 Version 4.0  Release: 32.2.4 - C32.71  © 2024 UpToDate, Inc. and/or its affiliates. All rights reserved.  figure 1: Nutrition label - Fats     This is an example of a nutrition label. To figure out how much and what kinds of fats are in a food, look for the lines that say \"Saturated Fat\" and \"Trans Fat.\" It's also important to look at the serving size. This food has 3 grams of saturated fat and 2 grams of trans fat in each serving, and each serving is 2 tablespoons (32 grams).  Graphic 529899 Version 1.0  Consumer Information Use and Disclaimer   Disclaimer: This generalized information is a limited summary of diagnosis, treatment, and/or medication information. It is not meant to be comprehensive and should be used as a tool to help the user understand and/or assess potential diagnostic and treatment options. It does NOT include all information about conditions, treatments, medications, side effects, or risks that may apply to a specific patient. It is not intended to be medical advice or a substitute for the medical advice, diagnosis, or treatment of a health care provider based on the health care provider's examination and assessment of a patient's specific and unique circumstances. Patients must speak with a health care provider for complete information about their health, medical questions, and treatment options, including any risks or benefits regarding use of medications. This information does not endorse any treatments or medications as safe, effective, or approved for treating a specific patient. UpToDate, Inc. and its affiliates " disclaim any warranty or liability relating to this information or the use thereof.The use of this information is governed by the Terms of Use, available at https://www.wolterskluwer.com/en/know/clinical-effectiveness-terms. 2024© UpToDate, Inc. and its affiliates and/or licensors. All rights reserved.  Copyright   © 2024 UpToDate, Inc. and/or its affiliates. All rights reserved.        Medicare Preventive Visit Patient Instructions  Thank you for completing your Welcome to Medicare Visit or Medicare Annual Wellness Visit today. Your next wellness visit will be due in one year (5/9/2026).  The screening/preventive services that you may require over the next 5-10 years are detailed below. Some tests may not apply to you based off risk factors and/or age. Screening tests ordered at today's visit but not completed yet may show as past due. Also, please note that scanned in results may not display below.  Preventive Screenings:  Service Recommendations Previous Testing/Comments   Colorectal Cancer Screening  Colonoscopy    Fecal Occult Blood Test (FOBT)/Fecal Immunochemical Test (FIT)  Fecal DNA/Cologuard Test  Flexible Sigmoidoscopy Age: 45-75 years old   Colonoscopy: every 10 years (May be performed more frequently if at higher risk)  OR  FOBT/FIT: every 1 year  OR  Cologuard: every 3 years  OR  Sigmoidoscopy: every 5 years  Screening may be recommended earlier than age 45 if at higher risk for colorectal cancer. Also, an individualized decision between you and your healthcare provider will decide whether screening between the ages of 76-85 would be appropriate. Colonoscopy: 06/05/2012  FOBT/FIT: Not on file  Cologuard: Not on file  Sigmoidoscopy: Not on file          Prostate Cancer Screening Individualized decision between patient and health care provider in men between ages of 55-69   Medicare will cover every 12 months beginning on the day after your 50th birthday PSA: 5.1 ng/mL     Screening Current     Hepatitis  C Screening Once for adults born between 1945 and 1965  More frequently in patients at high risk for Hepatitis C Hep C Antibody: Not on file    Screening Not Indicated  History Hepatitis C   Diabetes Screening 1-2 times per year if you're at risk for diabetes or have pre-diabetes Fasting glucose: No results in last 5 years (No results in last 5 years)  A1C: No results in last 5 years (No results in last 5 years)  Screening Current   Cholesterol Screening Once every 5 years if you don't have a lipid disorder. May order more often based on risk factors. Lipid panel: 04/08/2025  Screening Not Indicated  History Lipid Disorder      Other Preventive Screenings Covered by Medicare:  Abdominal Aortic Aneurysm (AAA) Screening: covered once if your at risk. You're considered to be at risk if you have a family history of AAA or a male between the age of 65-75 who smoking at least 100 cigarettes in your lifetime.  Lung Cancer Screening: covers low dose CT scan once per year if you meet all of the following conditions: (1) Age 55-77; (2) No signs or symptoms of lung cancer; (3) Current smoker or have quit smoking within the last 15 years; (4) You have a tobacco smoking history of at least 20 pack years (packs per day x number of years you smoked); (5) You get a written order from a healthcare provider.  Glaucoma Screening: covered annually if you're considered high risk: (1) You have diabetes OR (2) Family history of glaucoma OR (3)  aged 50 and older OR (4)  American aged 65 and older  Osteoporosis Screening: covered every 2 years if you meet one of the following conditions: (1) Have a vertebral abnormality; (2) On glucocorticoid therapy for more than 3 months; (3) Have primary hyperparathyroidism; (4) On osteoporosis medications and need to assess response to drug therapy.  HIV Screening: covered annually if you're between the age of 15-65. Also covered annually if you are younger than 15 and older  than 65 with risk factors for HIV infection. For pregnant patients, it is covered up to 3 times per pregnancy.    Immunizations:  Immunization Recommendations   Influenza Vaccine Annual influenza vaccination during flu season is recommended for all persons aged >= 6 months who do not have contraindications   Pneumococcal Vaccine   * Pneumococcal conjugate vaccine = PCV13 (Prevnar 13), PCV15 (Vaxneuvance), PCV20 (Prevnar 20)  * Pneumococcal polysaccharide vaccine = PPSV23 (Pneumovax) Adults 19-65 yo with certain risk factors or if 65+ yo  If never received any pneumonia vaccine: recommend Prevnar 20 (PCV20)  Give PCV20 if previously received 1 dose of PCV13 or PPSV23   Hepatitis B Vaccine 3 dose series if at intermediate or high risk (ex: diabetes, end stage renal disease, liver disease)   Respiratory syncytial virus (RSV) Vaccine - COVERED BY MEDICARE PART D  * RSVPreF3 (Arexvy) CDC recommends that adults 60 years of age and older may receive a single dose of RSV vaccine using shared clinical decision-making (SCDM)   Tetanus (Td) Vaccine - COST NOT COVERED BY MEDICARE PART B Following completion of primary series, a booster dose should be given every 10 years to maintain immunity against tetanus. Td may also be given as tetanus wound prophylaxis.   Tdap Vaccine - COST NOT COVERED BY MEDICARE PART B Recommended at least once for all adults. For pregnant patients, recommended with each pregnancy.   Shingles Vaccine (Shingrix) - COST NOT COVERED BY MEDICARE PART B  2 shot series recommended in those 19 years and older who have or will have weakened immune systems or those 50 years and older     Health Maintenance Due:      Topic Date Due    Colorectal Cancer Screening  06/05/2022    Hepatitis C Screening  Discontinued     Immunizations Due:      Topic Date Due    Pneumococcal Vaccine: 65+ Years (1 of 2 - PCV) Never done    Hepatitis A Vaccine (1 of 2 - Risk 2-dose series) Never done    Hepatitis B Vaccine (1 of 3 -  Risk 3-dose series) Never done    COVID-19 Vaccine (3 - 2024-25 season) 09/01/2024     Advance Directives   What are advance directives?  Advance directives are legal documents that state your wishes and plans for medical care. These plans are made ahead of time in case you lose your ability to make decisions for yourself. Advance directives can apply to any medical decision, such as the treatments you want, and if you want to donate organs.   What are the types of advance directives?  There are many types of advance directives, and each state has rules about how to use them. You may choose a combination of any of the following:  Living will:  This is a written record of the treatment you want. You can also choose which treatments you do not want, which to limit, and which to stop at a certain time. This includes surgery, medicine, IV fluid, and tube feedings.   Durable power of  for healthcare (DPAHC):  This is a written record that states who you want to make healthcare choices for you when you are unable to make them for yourself. This person, called a proxy, is usually a family member or a friend. You may choose more than 1 proxy.  Do not resuscitate (DNR) order:  A DNR order is used in case your heart stops beating or you stop breathing. It is a request not to have certain forms of treatment, such as CPR. A DNR order may be included in other types of advance directives.  Medical directive:  This covers the care that you want if you are in a coma, near death, or unable to make decisions for yourself. You can list the treatments you want for each condition. Treatment may include pain medicine, surgery, blood transfusions, dialysis, IV or tube feedings, and a ventilator (breathing machine).  Values history:  This document has questions about your views, beliefs, and how you feel and think about life. This information can help others choose the care that you would choose.  Why are advance directives  important?  An advance directive helps you control your care. Although spoken wishes may be used, it is better to have your wishes written down. Spoken wishes can be misunderstood, or not followed. Treatments may be given even if you do not want them. An advance directive may make it easier for your family to make difficult choices about your care.   Weight Management   Why it is important to manage your weight:  Being overweight increases your risk of health conditions such as heart disease, high blood pressure, type 2 diabetes, and certain types of cancer. It can also increase your risk for osteoarthritis, sleep apnea, and other respiratory problems. Aim for a slow, steady weight loss. Even a small amount of weight loss can lower your risk of health problems.  How to lose weight safely:  A safe and healthy way to lose weight is to eat fewer calories and get regular exercise. You can lose up about 1 pound a week by decreasing the number of calories you eat by 500 calories each day.   Healthy meal plan for weight management:  A healthy meal plan includes a variety of foods, contains fewer calories, and helps you stay healthy. A healthy meal plan includes the following:  Eat whole-grain foods more often.  A healthy meal plan should contain fiber. Fiber is the part of grains, fruits, and vegetables that is not broken down by your body. Whole-grain foods are healthy and provide extra fiber in your diet. Some examples of whole-grain foods are whole-wheat breads and pastas, oatmeal, brown rice, and bulgur.  Eat a variety of vegetables every day.  Include dark, leafy greens such as spinach, kale, jadyn greens, and mustard greens. Eat yellow and orange vegetables such as carrots, sweet potatoes, and winter squash.   Eat a variety of fruits every day.  Choose fresh or canned fruit (canned in its own juice or light syrup) instead of juice. Fruit juice has very little or no fiber.  Eat low-fat dairy foods.  Drink fat-free  (skim) milk or 1% milk. Eat fat-free yogurt and low-fat cottage cheese. Try low-fat cheeses such as mozzarella and other reduced-fat cheeses.  Choose meat and other protein foods that are low in fat.  Choose beans or other legumes such as split peas or lentils. Choose fish, skinless poultry (chicken or turkey), or lean cuts of red meat (beef or pork). Before you cook meat or poultry, cut off any visible fat.   Use less fat and oil.  Try baking foods instead of frying them. Add less fat, such as margarine, sour cream, regular salad dressing and mayonnaise to foods. Eat fewer high-fat foods. Some examples of high-fat foods include french fries, doughnuts, ice cream, and cakes.  Eat fewer sweets.  Limit foods and drinks that are high in sugar. This includes candy, cookies, regular soda, and sweetened drinks.  Exercise:  Exercise at least 30 minutes per day on most days of the week. Some examples of exercise include walking, biking, dancing, and swimming. You can also fit in more physical activity by taking the stairs instead of the elevator or parking farther away from stores. Ask your healthcare provider about the best exercise plan for you.      © Copyright [employer] 2018 Information is for End User's use only and may not be sold, redistributed or otherwise used for commercial purposes. All illustrations and images included in CareNotes® are the copyrighted property of [employer] or [employer]

## 2025-05-08 NOTE — PROGRESS NOTES
Name: Estuardo Plunkett      : 1960      MRN: [id number]  Encounter Provider: Olga Curtis DO  Encounter Date: 2025   Encounter department: [facility]  :  Assessment & Plan  Medicare annual wellness visit, subsequent         Benign prostatic hyperplasia with urinary obstruction  Continue tamsulosin and finasteride and follow up with urology. States he will never undergo another prostate biopsy, the last was very painful.    Orders:    PSA, total and free; Future    Elevated PSA, less than 10 ng/ml  If not seeing urology soon, the can follow up with repeat psa in 3-6 months.     Orders:    PSA, total and free; Future    Colon cancer screening    Orders:    Ambulatory Referral to Gastroenterology; Future      Depression Screening and Follow-up Plan: Patient was screened for depression during today's encounter. They screened negative with a PHQ-9 score of 0.        Preventive health issues were discussed with patient, and age appropriate screening tests were ordered as noted in patient's After Visit Summary. Personalized health advice and appropriate referrals for health education or preventive services given if needed, as noted in patient's After Visit Summary.    History of Present Illness     Pt is here for a medicare wellness. Doing ok today. He has chronic neck pain but is managing, limiting aggravating activities.        Patient Care Team:  Olga Curtis DO as PCP - General  Olga Curtis DO    Review of Systems   Constitutional: Negative.  Negative for fatigue and fever.   HENT: Negative.     Eyes: Negative.    Respiratory: Negative.  Negative for cough.    Cardiovascular: Negative.    Gastrointestinal: Negative.    Endocrine: Negative.    Genitourinary: Negative.    Musculoskeletal:  Positive for neck pain and neck stiffness.   Skin: Negative.    Allergic/Immunologic: Negative.    Neurological: Negative.    Psychiatric/Behavioral: Negative.       Medical History Reviewed by  provider this encounter:       Annual Wellness Visit Questionnaire   Estuardo is here for his Subsequent Wellness visit. Last Medicare Wellness visit information reviewed, patient interviewed, no change since last AWV.     Health Risk Assessment:   Patient rates overall health as good. Patient feels that their physical health rating is same. Patient is satisfied with their life. Eyesight was rated as same. Hearing was rated as same. Patient feels that their emotional and mental health rating is same. Patient states they are sometimes unusually tired/fatigued. Pain experienced in the last 7 days has been some. Patient's pain rating has been 5/10. Patient states that he has experienced weight loss or gain in last 6 months.     Depression Screening:   PHQ-9 Score: 0      Fall Risk Screening:   In the past year, patient has experienced: no history of falling in past year      Home Safety:  Patient does not have trouble with stairs inside or outside of their home. Patient has working smoke alarms and has no working carbon monoxide detector. Home safety hazards include: none.     Nutrition:   Current diet is Regular.     Medications:   Patient is currently taking over-the-counter supplements. OTC medications include: see medication list. Patient is able to manage medications.     Activities of Daily Living (ADLs)/Instrumental Activities of Daily Living (IADLs):   Walk and transfer into and out of bed and chair?: Yes  Dress and groom yourself?: Yes    Bathe or shower yourself?: Yes    Feed yourself? Yes  Do your laundry/housekeeping?: Yes  Manage your money, pay your bills and track your expenses?: Yes  Make your own meals?: Yes    Do your own shopping?: Yes    Previous Hospitalizations:   Any hospitalizations or ED visits within the last 12 months?: No      Preventive Screenings      Cardiovascular Screening:    General: Screening Not Indicated and History Lipid Disorder      Diabetes Screening:     General: Screening  "Current      Prostate Cancer Screening:    General: Screening Current      Abdominal Aortic Aneurysm (AAA) Screening:    Risk factors include: age between 65-76 yo and tobacco use        Lung Cancer Screening:     General: Screening Not Indicated      Hepatitis C Screening:    General: Screening Not Indicated and History Hepatitis C    Immunizations:  - Immunizations due: Prevnar 20, Zoster (Shingrix), Hepatitis A and Hepatitis B    Screening, Brief Intervention, and Referral to Treatment (SBIRT)     Screening  Typical number of drinks in a day: 0  Typical number of drinks in a week: 0  Interpretation: Low risk drinking behavior.    AUDIT-C Screenin) How often did you have a drink containing alcohol in the past year? never  2) How many drinks did you have on a typical day when you were drinking in the past year? 0  3) How often did you have 6 or more drinks on one occasion in the past year? never    AUDIT-C Score: 0  Interpretation: Score 0-3 (male): Negative screen for alcohol misuse    Single Item Drug Screening:  How often have you used an illegal drug (including marijuana) or a prescription medication for non-medical reasons in the past year? daily or almost daily    Single Item Drug Screen Score: 4  Interpretation: POSITIVE screen for possible drug use disorder    Drug Abuse Screening Test (DAST-10):  1) Have you used drugs other than those required for medical reasons? Yes  2) Do you abuse more than one drug at a time? No  3) Are you always able to stop using drugs when you want to? Yes  4) Have you had \"blackouts\" or \"flashbacks\" as a result of drug use? No  5) Do you ever feel bad or guilty about your drug use? No  6) Does your spouse (or parents) ever complain about your involvement with drugs? No  7) Have you neglected your family because of your use of drugs? No  8) Have you engaged in illegal activities in order to obtain drugs? No  9) Have you ever experienced withdrawal symptoms (felt sick) when " "you stopped taking drugs? No  10) Have you had medical problems as a result of your drug use (e.g., memory loss, hepatitis, convulsions, bleeding, etc.)? No    DAST-10 Score: 1  Interpretation: Low level problems related to drug abuse    Social Drivers of Health     Financial Resource Strain: Low Risk  (2/9/2023)    Overall Financial Resource Strain (CARDIA)     Difficulty of Paying Living Expenses: Not hard at all   Food Insecurity: No Food Insecurity (5/8/2025)    Hunger Vital Sign     Worried About Running Out of Food in the Last Year: Never true     Ran Out of Food in the Last Year: Never true   Transportation Needs: No Transportation Needs (5/8/2025)    PRAPARE - Transportation     Lack of Transportation (Medical): No     Lack of Transportation (Non-Medical): No   Housing Stability: Low Risk  (5/8/2025)    Housing Stability Vital Sign     Unable to Pay for Housing in the Last Year: No     Number of Times Moved in the Last Year: 0     Homeless in the Last Year: No   Utilities: Not At Risk (5/8/2025)    [facility] Utilities     Threatened with loss of utilities: No     No results found.    Objective   /76   Pulse 67   Temp (!) 96.7 °F (35.9 °C) (Tympanic)   Ht 5' 8\" (1.727 m)   Wt 76.2 kg (168 lb)   SpO2 96%   BMI 25.54 kg/m²     Physical Exam  Vitals and nursing note reviewed.   Constitutional:       Appearance: He is well-developed.   HENT:      Head: Normocephalic.      Right Ear: External ear normal.      Left Ear: External ear normal.      Nose: Nose normal.   Eyes:      Conjunctiva/sclera: Conjunctivae normal.      Pupils: Pupils are equal, round, and reactive to light.   Cardiovascular:      Rate and Rhythm: Normal rate and regular rhythm.   Pulmonary:      Effort: Pulmonary effort is normal.      Breath sounds: Normal breath sounds.   Abdominal:      General: Bowel sounds are normal.      Palpations: Abdomen is soft.   Musculoskeletal:         General: Tenderness present. No deformity. Normal range " of motion.      Cervical back: Normal range of motion and neck supple. Tenderness present. No rigidity.      Comments: Increased muscle tension cervical paraspinals with limited rom   Skin:     General: Skin is warm and dry.   Neurological:      Mental Status: He is alert and oriented to person, place, and time.   Psychiatric:         Behavior: Behavior normal.         Thought Content: Thought content normal.         Judgment: Judgment normal.

## 2025-05-11 NOTE — ASSESSMENT & PLAN NOTE
If not seeing urology soon, the can follow up with repeat psa in 3-6 months.     Orders:    PSA, total and free; Future

## 2025-05-11 NOTE — ASSESSMENT & PLAN NOTE
Continue tamsulosin and finasteride and follow up with urology. States he will never undergo another prostate biopsy, the last was very painful.    Orders:    PSA, total and free; Future

## 2025-06-07 PROBLEM — Z12.11 COLON CANCER SCREENING: Status: RESOLVED | Noted: 2025-05-08 | Resolved: 2025-06-07

## 2025-06-07 PROBLEM — Z00.00 MEDICARE ANNUAL WELLNESS VISIT, SUBSEQUENT: Status: RESOLVED | Noted: 2019-10-14 | Resolved: 2025-06-07

## 2025-07-08 ENCOUNTER — VBI (OUTPATIENT)
Dept: ADMINISTRATIVE | Facility: OTHER | Age: 65
End: 2025-07-08

## 2025-07-08 NOTE — TELEPHONE ENCOUNTER
07/08/25 11:44 AM     Chart reviewed for CRC: Colonoscopy ; nothing is submitted to the patient's insurance at this time.     Maryam Ware   [facility]